# Patient Record
Sex: FEMALE | Race: OTHER | Employment: FULL TIME | URBAN - METROPOLITAN AREA
[De-identification: names, ages, dates, MRNs, and addresses within clinical notes are randomized per-mention and may not be internally consistent; named-entity substitution may affect disease eponyms.]

---

## 2017-08-25 ENCOUNTER — HOSPITAL ENCOUNTER (OUTPATIENT)
Age: 27
Setting detail: SPECIMEN
Discharge: HOME | End: 2017-08-25
Payer: COMMERCIAL

## 2017-08-25 ENCOUNTER — OFFICE VISIT (OUTPATIENT)
Dept: OBGYN CLINIC | Age: 27
End: 2017-08-25
Payer: COMMERCIAL

## 2017-08-25 VITALS
WEIGHT: 135 LBS | SYSTOLIC BLOOD PRESSURE: 92 MMHG | HEIGHT: 60 IN | BODY MASS INDEX: 26.5 KG/M2 | DIASTOLIC BLOOD PRESSURE: 60 MMHG | HEART RATE: 64 BPM

## 2017-08-25 DIAGNOSIS — Z01.419 ENCOUNTER FOR GYNECOLOGICAL EXAMINATION WITHOUT ABNORMAL FINDING: Primary | ICD-10-CM

## 2017-08-25 PROCEDURE — 99395 PREV VISIT EST AGE 18-39: CPT | Performed by: ADVANCED PRACTICE MIDWIFE

## 2017-08-25 ASSESSMENT — PATIENT HEALTH QUESTIONNAIRE - PHQ9
SUM OF ALL RESPONSES TO PHQ9 QUESTIONS 1 & 2: 0
2. FEELING DOWN, DEPRESSED OR HOPELESS: 0
SUM OF ALL RESPONSES TO PHQ QUESTIONS 1-9: 0
1. LITTLE INTEREST OR PLEASURE IN DOING THINGS: 0

## 2017-08-29 LAB — CYTOLOGY REPORT: NORMAL

## 2018-02-14 ENCOUNTER — OFFICE VISIT (OUTPATIENT)
Dept: OBGYN CLINIC | Age: 28
End: 2018-02-14
Payer: COMMERCIAL

## 2018-02-14 VITALS
HEIGHT: 62 IN | SYSTOLIC BLOOD PRESSURE: 106 MMHG | WEIGHT: 136 LBS | HEART RATE: 71 BPM | DIASTOLIC BLOOD PRESSURE: 68 MMHG | BODY MASS INDEX: 25.03 KG/M2

## 2018-02-14 DIAGNOSIS — N91.2 AMENORRHEA, UNSPECIFIED: Primary | ICD-10-CM

## 2018-02-14 LAB
CONTROL: POSITIVE
PREGNANCY TEST URINE, POC: NEGATIVE

## 2018-02-14 PROCEDURE — 1036F TOBACCO NON-USER: CPT | Performed by: ADVANCED PRACTICE MIDWIFE

## 2018-02-14 PROCEDURE — G8420 CALC BMI NORM PARAMETERS: HCPCS | Performed by: ADVANCED PRACTICE MIDWIFE

## 2018-02-14 PROCEDURE — 99213 OFFICE O/P EST LOW 20 MIN: CPT | Performed by: ADVANCED PRACTICE MIDWIFE

## 2018-02-14 PROCEDURE — 81025 URINE PREGNANCY TEST: CPT | Performed by: ADVANCED PRACTICE MIDWIFE

## 2018-02-14 PROCEDURE — G8484 FLU IMMUNIZE NO ADMIN: HCPCS | Performed by: ADVANCED PRACTICE MIDWIFE

## 2018-02-14 PROCEDURE — G8427 DOCREV CUR MEDS BY ELIG CLIN: HCPCS | Performed by: ADVANCED PRACTICE MIDWIFE

## 2018-02-14 ASSESSMENT — PATIENT HEALTH QUESTIONNAIRE - PHQ9
SUM OF ALL RESPONSES TO PHQ QUESTIONS 1-9: 0
SUM OF ALL RESPONSES TO PHQ9 QUESTIONS 1 & 2: 0
2. FEELING DOWN, DEPRESSED OR HOPELESS: 0
1. LITTLE INTEREST OR PLEASURE IN DOING THINGS: 0

## 2018-02-15 ENCOUNTER — HOSPITAL ENCOUNTER (OUTPATIENT)
Age: 28
Discharge: HOME | End: 2018-02-15
Payer: COMMERCIAL

## 2018-02-15 ENCOUNTER — HOSPITAL ENCOUNTER (OUTPATIENT)
Dept: ULTRASOUND IMAGING | Age: 28
Discharge: HOME | End: 2018-02-17
Payer: COMMERCIAL

## 2018-02-15 DIAGNOSIS — N91.2 AMENORRHEA, UNSPECIFIED: ICD-10-CM

## 2018-02-15 LAB
ESTIMATED AVERAGE GLUCOSE: 97 MG/DL
FOLLICLE STIMULATING HORMONE: 4.1 U/L (ref 1.7–21.5)
HBA1C MFR BLD: 5 % (ref 4–6)
HCG QUANTITATIVE: <1 IU/L
INSULIN COMMENT: NORMAL
INSULIN REFERENCE RANGE:: NORMAL
INSULIN: 9.7 MU/L
LH: 14.9 U/L (ref 1–95.6)
PROLACTIN: 8.89 UG/L (ref 4.79–23.3)
THYROXINE, FREE: 1.37 NG/DL (ref 0.93–1.7)
TSH SERPL DL<=0.05 MIU/L-ACNC: 3.59 MIU/L (ref 0.3–5)

## 2018-02-15 PROCEDURE — 84146 ASSAY OF PROLACTIN: CPT

## 2018-02-15 PROCEDURE — 76830 TRANSVAGINAL US NON-OB: CPT

## 2018-02-15 PROCEDURE — 83002 ASSAY OF GONADOTROPIN (LH): CPT

## 2018-02-15 PROCEDURE — 36415 COLL VENOUS BLD VENIPUNCTURE: CPT

## 2018-02-15 PROCEDURE — 84443 ASSAY THYROID STIM HORMONE: CPT

## 2018-02-15 PROCEDURE — 76856 US EXAM PELVIC COMPLETE: CPT

## 2018-02-15 PROCEDURE — 84439 ASSAY OF FREE THYROXINE: CPT

## 2018-02-15 PROCEDURE — 83036 HEMOGLOBIN GLYCOSYLATED A1C: CPT

## 2018-02-15 PROCEDURE — 83525 ASSAY OF INSULIN: CPT

## 2018-02-15 PROCEDURE — 84702 CHORIONIC GONADOTROPIN TEST: CPT

## 2018-02-15 PROCEDURE — 83001 ASSAY OF GONADOTROPIN (FSH): CPT

## 2018-02-21 ENCOUNTER — PATIENT MESSAGE (OUTPATIENT)
Dept: OBGYN CLINIC | Age: 28
End: 2018-02-21

## 2018-03-01 ENCOUNTER — OFFICE VISIT (OUTPATIENT)
Dept: OBGYN CLINIC | Age: 28
End: 2018-03-01
Payer: COMMERCIAL

## 2018-03-01 ENCOUNTER — PATIENT MESSAGE (OUTPATIENT)
Dept: OBGYN CLINIC | Age: 28
End: 2018-03-01

## 2018-03-01 VITALS
HEIGHT: 62 IN | HEART RATE: 68 BPM | WEIGHT: 134.5 LBS | BODY MASS INDEX: 24.75 KG/M2 | SYSTOLIC BLOOD PRESSURE: 100 MMHG | OXYGEN SATURATION: 99 % | DIASTOLIC BLOOD PRESSURE: 61 MMHG

## 2018-03-01 DIAGNOSIS — E28.2 PCOS (POLYCYSTIC OVARIAN SYNDROME): Primary | ICD-10-CM

## 2018-03-01 PROCEDURE — 99212 OFFICE O/P EST SF 10 MIN: CPT | Performed by: OBSTETRICS & GYNECOLOGY

## 2018-03-01 PROCEDURE — 1036F TOBACCO NON-USER: CPT | Performed by: OBSTETRICS & GYNECOLOGY

## 2018-03-01 PROCEDURE — G8484 FLU IMMUNIZE NO ADMIN: HCPCS | Performed by: OBSTETRICS & GYNECOLOGY

## 2018-03-01 PROCEDURE — G8427 DOCREV CUR MEDS BY ELIG CLIN: HCPCS | Performed by: OBSTETRICS & GYNECOLOGY

## 2018-03-01 PROCEDURE — G8420 CALC BMI NORM PARAMETERS: HCPCS | Performed by: OBSTETRICS & GYNECOLOGY

## 2018-03-05 RX ORDER — LEVOTHYROXINE SODIUM 0.05 MG/1
50 TABLET ORAL DAILY
Qty: 30 TABLET | Refills: 3 | Status: SHIPPED | OUTPATIENT
Start: 2018-03-05 | End: 2018-07-19 | Stop reason: CLARIF

## 2018-07-18 ENCOUNTER — OFFICE VISIT (OUTPATIENT)
Dept: OBGYN CLINIC | Age: 28
End: 2018-07-18
Payer: COMMERCIAL

## 2018-07-18 ENCOUNTER — HOSPITAL ENCOUNTER (OUTPATIENT)
Age: 28
Setting detail: SPECIMEN
Discharge: HOME | End: 2018-07-18
Payer: COMMERCIAL

## 2018-07-18 VITALS
WEIGHT: 132 LBS | SYSTOLIC BLOOD PRESSURE: 105 MMHG | DIASTOLIC BLOOD PRESSURE: 71 MMHG | BODY MASS INDEX: 24.29 KG/M2 | HEIGHT: 62 IN | HEART RATE: 66 BPM

## 2018-07-18 DIAGNOSIS — E28.2 PCOS (POLYCYSTIC OVARIAN SYNDROME): Primary | Chronic | ICD-10-CM

## 2018-07-18 LAB
INSULIN COMMENT: NORMAL
INSULIN REFERENCE RANGE:: NORMAL
INSULIN: 6.9 MU/L
THYROXINE, FREE: 1.36 NG/DL (ref 0.93–1.7)
TSH SERPL DL<=0.05 MIU/L-ACNC: 4.49 MIU/L (ref 0.3–5)

## 2018-07-18 PROCEDURE — G8420 CALC BMI NORM PARAMETERS: HCPCS | Performed by: OBSTETRICS & GYNECOLOGY

## 2018-07-18 PROCEDURE — 99213 OFFICE O/P EST LOW 20 MIN: CPT | Performed by: OBSTETRICS & GYNECOLOGY

## 2018-07-18 PROCEDURE — 1036F TOBACCO NON-USER: CPT | Performed by: OBSTETRICS & GYNECOLOGY

## 2018-07-18 PROCEDURE — G8427 DOCREV CUR MEDS BY ELIG CLIN: HCPCS | Performed by: OBSTETRICS & GYNECOLOGY

## 2018-07-18 NOTE — PROGRESS NOTES
The patient was seen today. She is here regarding ovarian dysfunction . Her bowels are regular and she is voiding without difficulty. HPI: 31yo  who has not been having regular menses, desires pregnancy. Thyroids have not been checked since early 2018. Taking replacement and taking daily Metformin. Past Medical History:   Diagnosis Date    History of PCOS     No pertinent family history      Past Surgical History:   Procedure Laterality Date     SECTION  2016     REVIEW OF SYSTEMS:        A minimum of an eleven point review of systems was completed and found to be negative except for the pertinent positives found below. Constitutional: No fever, chills or malaise; No weight change or fatigue  Head and Eyes: No vision, Headache, Dizziness or trauma in last 12 months  ENT ROS: No hearing, Tinnitis, sinus or taste problems  Hematological and Lymphatic ROS:No Lymphoma, Von Willebrand's, Hemophillia or Bleeding History  Psych ROS: No Depression, Homicidal thoughts,suicidal thoughts, or anxiety  Breast ROS: No prior breast abnormalities or lumps  Respiratory ROS: No SOB, Pneumoniae,Cough, or Pulmonary Embolism History  Cardiovascular ROS: No Chest Pain with Exertion, Palpitations, Syncope, Edema, Arrhythmia  Gastrointestinal ROS: No Indigestion, Heartburn, Nausea, vomiting, Diahrea, Constipation,or Bowel Changes; No Bloody Stools or melena  Genito-Urinary ROS: No Dysuria, Hematuria or Nocturia. No Urinary Incontinence or Vaginal Discharge  Musculoskeletal ROS: No Arthralgia, Arthritis,Gout,Osteoporosis or Rheumatism  Neurological ROS: No CVA, Migraines, Epilepsy, Seizure Hx, or Limb Weakness  Dermatological ROS: No Rash, Itching, Hives, Mole Changes or Cancer                                            Blood pressure 105/71, pulse 66, height 5' 2\" (1.575 m), weight 132 lb (59.9 kg), last menstrual period 2018, not currently breastfeeding. Abdomen: Soft non-tender; good bowel sounds.  No guarding, rebound or rigidity. No CVA tenderness bilaterally. Extremities: No calf tenderness, DTR 2/4, and No edema bilaterally    Pelvic: Exam deferred. Assessment:   Diagnosis Orders   1. PCOS (polycystic ovarian syndrome)  TSH    T4, Free    Insulin, Fasting       PLAN:  Follow up labs, tailor therapy, if need to adjust thyroid will wait for Clomid, if not will start 100mg after Provera withdraw. Return to the office in 12 weeks. Counseled on preventative health maintenance follow-up. Patient was seen with total face to face time of 15 minutes. More than 50% of this visit was counseling and education regarding The encounter diagnosis was PCOS (polycystic ovarian syndrome). and Follow-up (med check)   as well as  counseling on preventative health maintenance follow-up.

## 2018-07-19 ENCOUNTER — TELEPHONE (OUTPATIENT)
Dept: OBGYN CLINIC | Age: 28
End: 2018-07-19

## 2018-07-19 DIAGNOSIS — E07.9 THYROID DISEASE: Primary | ICD-10-CM

## 2018-07-19 RX ORDER — LEVOTHYROXINE SODIUM 0.1 MG/1
100 TABLET ORAL DAILY
Qty: 30 TABLET | Refills: 1 | Status: SHIPPED | OUTPATIENT
Start: 2018-07-19 | End: 2019-06-28 | Stop reason: SDUPTHER

## 2018-07-19 NOTE — TELEPHONE ENCOUNTER
Pt given results and recommendations. Pts synthroid increased to 100 mcg. Advised to repeat tsh in 6 weeks.  Order in epic

## 2018-10-04 ENCOUNTER — OFFICE VISIT (OUTPATIENT)
Dept: OBGYN CLINIC | Age: 28
End: 2018-10-04
Payer: COMMERCIAL

## 2018-10-04 ENCOUNTER — HOSPITAL ENCOUNTER (OUTPATIENT)
Age: 28
Setting detail: SPECIMEN
Discharge: HOME | End: 2018-10-04
Payer: COMMERCIAL

## 2018-10-04 VITALS
WEIGHT: 127.8 LBS | BODY MASS INDEX: 23.52 KG/M2 | SYSTOLIC BLOOD PRESSURE: 107 MMHG | HEART RATE: 71 BPM | HEIGHT: 62 IN | DIASTOLIC BLOOD PRESSURE: 64 MMHG

## 2018-10-04 DIAGNOSIS — N76.0 VAGINAL INFECTION: ICD-10-CM

## 2018-10-04 DIAGNOSIS — N89.8 VAGINAL DISCHARGE: ICD-10-CM

## 2018-10-04 DIAGNOSIS — N89.8 VAGINAL DISCHARGE: Primary | ICD-10-CM

## 2018-10-04 PROCEDURE — 99213 OFFICE O/P EST LOW 20 MIN: CPT | Performed by: ADVANCED PRACTICE MIDWIFE

## 2018-10-04 RX ORDER — FLUCONAZOLE 100 MG/1
100 TABLET ORAL DAILY
Qty: 7 TABLET | Refills: 0 | Status: SHIPPED | OUTPATIENT
Start: 2018-10-04 | End: 2018-10-11

## 2018-10-04 NOTE — PROGRESS NOTES
Nick Omer  1990  10/4/18    SUBJECTIVE    Jojo Larsen presents today with a c/o occasional vaginal irritation. She states her  is seeing a urologist x 1 year for irritation, small cuts, and swelling that happens after intercourse. He is being treated with an anti-fungal. She is wondering if maybe they are passing an infection back and forth. OBJECTIVE    No LMP recorded (lmp unknown). Vitals:    10/04/18 1056   BP: 107/64   Pulse: 71     Physical Examination: General appearance - alert, well appearing, and in no distress, oriented to person, place, and time and normal appearing weight  Mental status - alert, oriented to person, place, and time, normal mood, behavior, speech, dress, motor activity, and thought processes  Pelvic - normal external genitalia, vulva, vagina, cervix, uterus and adnexa    ASSESSMENT & PLAN    Patient Active Problem List   Diagnosis    PCOS (polycystic ovarian syndrome)    History of  section    Amenorrhea, unspecified    Vaginal infection     1. Possible vaginal infection being passed between pt and ,  being treated for candida   -- Vaginitis probe ordered   -- Diflucan ordered, patient is uncomfortable with vaginal cream   -- Abstinence recommended    Over 50% of this 15 minute visit was spent on counseling and education.

## 2018-10-05 LAB
C. TRACHOMATIS DNA ,URINE: NEGATIVE
DIRECT EXAM: NORMAL
Lab: NORMAL
N. GONORRHOEAE DNA, URINE: NEGATIVE
SPECIMEN DESCRIPTION: NORMAL
STATUS: NORMAL

## 2018-10-07 LAB
CULTURE: NORMAL
Lab: NORMAL
SPECIMEN DESCRIPTION: NORMAL
STATUS: NORMAL

## 2019-06-14 ENCOUNTER — OFFICE VISIT (OUTPATIENT)
Dept: OBGYN CLINIC | Age: 29
End: 2019-06-14
Payer: COMMERCIAL

## 2019-06-14 VITALS
SYSTOLIC BLOOD PRESSURE: 101 MMHG | HEIGHT: 62 IN | DIASTOLIC BLOOD PRESSURE: 64 MMHG | WEIGHT: 119.7 LBS | BODY MASS INDEX: 22.03 KG/M2 | HEART RATE: 61 BPM

## 2019-06-14 DIAGNOSIS — E28.2 PCOS (POLYCYSTIC OVARIAN SYNDROME): Chronic | ICD-10-CM

## 2019-06-14 DIAGNOSIS — N93.9 ABNORMAL UTERINE BLEEDING (AUB): Primary | ICD-10-CM

## 2019-06-14 PROCEDURE — 99213 OFFICE O/P EST LOW 20 MIN: CPT | Performed by: ADVANCED PRACTICE MIDWIFE

## 2019-06-14 NOTE — PROGRESS NOTES
CHIEF COMPLAINT:     Chief Complaint   Patient presents with    Menstrual Problem       HPI:   [facility] presents today to discuss menses and desire for pregnancy. Had previously needing Clomid, etc to achieve pregnancy, given PCOS history. She has lost a significant amount of weight in the past few months and so had first menses in 3 months. She did breastfeed and after weaning, had a period about 6 months later. She did see Dr Kayla Swanson for lab work but did not get done. Since then, she started working but now feels she needs to be more proactive about achieving pregnancy. She is taking a PVN. [facility] she denies any changes to her health history. GYNECOLOGIC HISTORY:     Patient's last menstrual period was 05/19/2019 (exact date). Sexually active: Yes,     STD history: No    Birth control method :No    REVIEW OF SYSTEMS:  Review of Systems   Constitutional: Negative. HENT: Negative. Respiratory: Negative. Cardiovascular: Negative. Gastrointestinal: Negative. Genitourinary: Positive for menstrual problem. Irregular menses   Musculoskeletal: Negative. Skin: Negative. Neurological: Negative. PHYSICAL EXAM:  Constitutional:   Blood pressure 101/64, pulse 61, height 5' 2\" (1.575 m), weight 119 lb 11.2 oz (54.3 kg), last menstrual period 05/19/2019, not currently breastfeeding. Wt Readings from Last 3 Encounters:   06/14/19 119 lb 11.2 oz (54.3 kg)   01/29/19 127 lb 12.8 oz (58 kg)   10/04/18 127 lb 12.8 oz (58 kg)       General Appearance: This is a well-developed, well-nourishedand well-groomed female    Skin:  Inspection of the skin revealed no rashes or lesions     Pelvic:  Deferred    Psychiatric:  Alert, oriented to time, place, person and situation. There are no mood or affect changes. ASSESSMENT/PLAN:  1. Abnormal uterine bleeding (AUB)  - HCG, Quantitative, Pregnancy; Future  - Prolactin; Future  - T4, Free; Future  - TSH without Reflex;  Future  - Hemoglobin A1C; Future  - Insulin, Fasting; Future  - Follicle Stimulating Hormone; Future  - US Non OB Transvaginal; Future  - US Pelvis Complete; Future  - Lipid Panel; Future  - Discussed updated blood work needed, as working with new Collaborators and desires. She will schedule follow-up with physician to establish plan of care, if Clomid, etc needed again    2. PCOS (polycystic ovarian syndrome)  - Discussed onset of menses with weight loss, may be triggering ovulation  - Reinforced weight loss with low CHO diet    Patient was seen with totalface to face time of 15 minutes. More than 50% of this visit was on counseling and education regarding her   1. Abnormal uterine bleeding (AUB)    2. PCOS (polycystic ovarian syndrome)     and her options. She was also counseled on her preventative health maintenance recommendations andfollow-up.

## 2019-06-15 PROBLEM — N93.9 ABNORMAL UTERINE BLEEDING (AUB): Status: ACTIVE | Noted: 2019-06-15

## 2019-06-15 PROBLEM — N76.0 VAGINAL INFECTION: Status: RESOLVED | Noted: 2018-10-04 | Resolved: 2019-06-15

## 2019-06-15 ASSESSMENT — ENCOUNTER SYMPTOMS
GASTROINTESTINAL NEGATIVE: 1
RESPIRATORY NEGATIVE: 1

## 2019-06-24 ENCOUNTER — HOSPITAL ENCOUNTER (OUTPATIENT)
Age: 29
Discharge: HOME | End: 2019-06-24
Payer: COMMERCIAL

## 2019-06-24 DIAGNOSIS — N93.9 ABNORMAL UTERINE BLEEDING (AUB): ICD-10-CM

## 2019-06-24 LAB
CHOLESTEROL/HDL RATIO: 2.5
CHOLESTEROL: 165 MG/DL
ESTIMATED AVERAGE GLUCOSE: 91 MG/DL
FOLLICLE STIMULATING HORMONE: 5.6 U/L (ref 1.7–21.5)
HBA1C MFR BLD: 4.8 % (ref 4–6)
HCG QUANTITATIVE: <1 IU/L
HDLC SERPL-MCNC: 67 MG/DL
INSULIN COMMENT: NORMAL
INSULIN REFERENCE RANGE:: NORMAL
INSULIN: 5.2 MU/L
LDL CHOLESTEROL: 89 MG/DL (ref 0–130)
PROLACTIN: 7.89 UG/L (ref 4.79–23.3)
THYROXINE, FREE: 1.1 NG/DL (ref 0.93–1.7)
TRIGL SERPL-MCNC: 43 MG/DL
TSH SERPL DL<=0.05 MIU/L-ACNC: 2.62 MIU/L (ref 0.3–5)
VLDLC SERPL CALC-MCNC: NORMAL MG/DL (ref 1–30)

## 2019-06-24 PROCEDURE — 84702 CHORIONIC GONADOTROPIN TEST: CPT

## 2019-06-24 PROCEDURE — 80061 LIPID PANEL: CPT

## 2019-06-24 PROCEDURE — 83001 ASSAY OF GONADOTROPIN (FSH): CPT

## 2019-06-24 PROCEDURE — 36415 COLL VENOUS BLD VENIPUNCTURE: CPT

## 2019-06-24 PROCEDURE — 84443 ASSAY THYROID STIM HORMONE: CPT

## 2019-06-24 PROCEDURE — 83036 HEMOGLOBIN GLYCOSYLATED A1C: CPT

## 2019-06-24 PROCEDURE — 83525 ASSAY OF INSULIN: CPT

## 2019-06-24 PROCEDURE — 84439 ASSAY OF FREE THYROXINE: CPT

## 2019-06-24 PROCEDURE — 84146 ASSAY OF PROLACTIN: CPT

## 2019-06-25 ENCOUNTER — HOSPITAL ENCOUNTER (OUTPATIENT)
Dept: ULTRASOUND IMAGING | Age: 29
Discharge: HOME | End: 2019-06-27
Payer: COMMERCIAL

## 2019-06-25 DIAGNOSIS — N93.9 ABNORMAL UTERINE BLEEDING (AUB): ICD-10-CM

## 2019-06-25 PROCEDURE — 76856 US EXAM PELVIC COMPLETE: CPT

## 2019-06-25 PROCEDURE — 76830 TRANSVAGINAL US NON-OB: CPT

## 2019-06-28 ENCOUNTER — OFFICE VISIT (OUTPATIENT)
Dept: OBGYN CLINIC | Age: 29
End: 2019-06-28
Payer: COMMERCIAL

## 2019-06-28 VITALS
HEART RATE: 72 BPM | DIASTOLIC BLOOD PRESSURE: 66 MMHG | HEIGHT: 62 IN | BODY MASS INDEX: 21.71 KG/M2 | WEIGHT: 118 LBS | SYSTOLIC BLOOD PRESSURE: 95 MMHG

## 2019-06-28 DIAGNOSIS — E28.2 PCOS (POLYCYSTIC OVARIAN SYNDROME): Primary | ICD-10-CM

## 2019-06-28 DIAGNOSIS — E03.9 HYPOTHYROIDISM, UNSPECIFIED TYPE: ICD-10-CM

## 2019-06-28 PROCEDURE — 99213 OFFICE O/P EST LOW 20 MIN: CPT | Performed by: OBSTETRICS & GYNECOLOGY

## 2019-06-28 RX ORDER — LETROZOLE 2.5 MG/1
2.5 TABLET, FILM COATED ORAL DAILY
Qty: 5 TABLET | Refills: 2 | Status: SHIPPED
Start: 2019-06-28 | End: 2020-09-04 | Stop reason: DRUGHIGH

## 2019-06-28 RX ORDER — LEVOTHYROXINE SODIUM 0.1 MG/1
100 TABLET ORAL DAILY
Qty: 30 TABLET | Refills: 3 | Status: SHIPPED | OUTPATIENT
Start: 2019-06-28 | End: 2020-05-31

## 2019-06-28 RX ORDER — MEDROXYPROGESTERONE ACETATE 10 MG/1
10 TABLET ORAL DAILY
Qty: 30 TABLET | Refills: 0 | Status: SHIPPED | OUTPATIENT
Start: 2019-06-28 | End: 2020-05-19 | Stop reason: SDUPTHER

## 2019-06-28 NOTE — PROGRESS NOTES
[facility]  [facility] - [address] [address]  Dept: [phone]  Dept Fax: [phone]    06/28/19    Chief Complaint   Patient presents with    Results       Forest Shell 26 y.o. Alexys Patrick presents for follow up of results. She has been seeing the midwives but has h/o PCOS (irregular menses and polycystic ovaries with volume >10 cubic cm on ultrasound) and currently desires pregnancy. Her last pregnancy was conceived on the 6th round of clomid. History also pertinent for hypothyroidism. States she was taking synthroid 100mcg \"but ran out of the pills some time ago. \"  She has been improving her diet and exercise regimen over the past few years and has lost some weight. She usually requires progesterone for a withdrawal bleed but did have a normal period not triggered by progesterone last month. Patient's last menstrual period was 05/19/2019 (exact date). She and her  have been trying for the past two years to conceive and she was hoping she could proceed with ovulation induction again since they have not had any success on their own. Reports the only medication she is taking currently is a prenatal vitamin.       REVIEW OF SYSTEMS:    Constitutional: negative fever, negative chills  HEENT: negative visual disturbances, negative headaches  Respiratory: negative dyspnea, negative cough  Cardiovascular: negative chest pain,  negative palpitations  Gastrointestinal: negative Abdominal pain, negative RUQ pain, negative N/V/D, negative constipation  Genitourinary: negative dysuria, negative vaginal discharge  Dermatological: negative rash, negative wounds  Hematologic: negative bleeding/clotting disorder  Immunologic: negative recent illness, negative recent sick contact, negative allergic reactions  Lymphatic: negative lymph nodes  Musculoskeletal: negative back pain, negative myalgias, negative arthralgias  Neurological:  negative dizziness, negative weakness  Behavior/Psych: negative depression, negative anxiety    Past Medical History:   Diagnosis Date    History of PCOS     No pertinent family history      Past Surgical History:   Procedure Laterality Date     SECTION  2016     No Known Allergies  Current Outpatient Medications   Medication Sig Dispense Refill    Prenatal Multivit-Min-Fe-FA (PRENATAL VITAMINS PO) Take 1 tablet by mouth daily      metFORMIN (GLUCOPHAGE) 500 MG tablet Take 1 tablet by mouth daily (with breakfast) 60 tablet 3    levothyroxine (SYNTHROID) 100 MCG tablet Take 1 tablet by mouth daily 30 tablet 1     No current facility-administered medications for this visit.       Social History     Socioeconomic History    Marital status:      Spouse name: Not on file    Number of children: Not on file    Years of education: Not on file    Highest education level: Not on file   Occupational History    Not on file   Social Needs    Financial resource strain: Not on file    Food insecurity:     Worry: Not on file     Inability: Not on file    Transportation needs:     Medical: Not on file     Non-medical: Not on file   Tobacco Use    Smoking status: Never Smoker    Smokeless tobacco: Never Used   Substance and Sexual Activity    Alcohol use: No     Alcohol/week: 0.0 oz    Drug use: No    Sexual activity: Yes     Partners: Male   Lifestyle    Physical activity:     Days per week: Not on file     Minutes per session: Not on file    Stress: Not on file   Relationships    Social connections:     Talks on phone: Not on file     Gets together: Not on file     Attends [employer] service: Not on file     Active member of club or organization: Not on file     Attends meetings of clubs or organizations: Not on file     Relationship status: Not on file    Intimate partner violence:     Fear of current or ex partner: Not on file     Emotionally abused: Not on file     Physically abused: Not on file     Forced sexual activity: Not on file   Other Topics Concern    Not on file   Social History Narrative    Not on file     No family history on file.     Vitals:    06/28/19 1140   BP: 95/66   Site: Right Upper Arm   Position: Sitting   Cuff Size: Medium Adult   Pulse: 72   Weight: 118 lb (53.5 kg)   Height: 5' 2\" (1.575 m)       Physical exam  Gen: no acute distress, appears comfortable  Heart: regular rate and rhythm, no murmurs appreciated  Lungs: clear to auscultation bilaterally  Abd: soft, nontender, nondistended, no rebound/guarding/rigidity  Ext: no edema or calf tenderness    Recent Results (from the past 120 hour(s))   HCG, Quantitative, Pregnancy    Collection Time: 06/24/19 10:18 AM   Result Value Ref Range    hCG Quant <1 <5 IU/L   Prolactin    Collection Time: 06/24/19 10:18 AM   Result Value Ref Range    Prolactin 7.89 4.79 - 23.30 ug/L   T4, Free    Collection Time: 06/24/19 10:18 AM   Result Value Ref Range    Thyroxine, Free 1.10 0.93 - 1.70 ng/dL   TSH without Reflex    Collection Time: 06/24/19 10:18 AM   Result Value Ref Range    TSH 2.62 0.30 - 5.00 mIU/L   Hemoglobin A1C    Collection Time: 06/24/19 10:18 AM   Result Value Ref Range    Hemoglobin A1C 4.8 4.0 - 6.0 %    Estimated Avg Glucose 91 mg/dL   Follicle Stimulating Hormone    Collection Time: 06/24/19 10:18 AM   Result Value Ref Range    FSH 5.6 1.7 - 21.5 U/L   Lipid Panel    Collection Time: 06/24/19 10:18 AM   Result Value Ref Range    Cholesterol 165 <200 mg/dL    HDL 67 >40 mg/dL    LDL Cholesterol 89 0 - 130 mg/dL    Chol/HDL Ratio 2.5 <5    Triglycerides 43 <150 mg/dL    VLDL NOT REPORTED 1 - 30 mg/dL   Insulin, total    Collection Time: 06/24/19 10:18 AM   Result Value Ref Range    Insulin Comment Patient fasting     Insulin 5.2 mU/L    Insulin Reference Range:           Narrative   EXAMINATION:   PELVIC ULTRASOUND       6/25/2019       TECHNIQUE:   Transabdominal and transvaginal pelvic ultrasound was performed.       COMPARISON: 02/15/2018 ultrasound       HISTORY:   ORDERING SYSTEM PROVIDED HISTORY: Abnormal uterine bleeding (AUB)   TECHNOLOGIST PROVIDED HISTORY:   AUB       FINDINGS:       Measurements:       Uterus:  10.2 x 2.8 x 3.9 cm       Endometrial stripe:  11 mm       Right Ovary:  3.7 x 2.2 x 3.4 cm       Left Ovary:  3.6 x 2.6 x 2.8 cm           Ultrasound Findings:       Uterus: The uterus is anteverted.  No myometrial abnormalities are detected.       Endometrial stripe: Endometrial stripe is within normal limits.       Right Ovary: Multiple tiny anechoic follicles are present in the right ovary.       Left Ovary:  Multiple tiny anechoic follicles are present in the left ovary.       Free Fluid: No evidence of free fluid.           Impression   Physiologic follicles in the bilateral ovaries.  No other significant finding.               Assessment/Plan    May Bonds I Michael Garcia is a 26 y.o. Edmund Morning with PCOS and hypothyroidism who desires pregnancy   - reviewed lab results and reassurance given. Encouraged compliance with synthroid and Rx sent to pharmacy. Repeat TSH and T4 in 4 weeks. - discussed ovulation induction options of clomid or letrozole. Reviewed that even though letrozole is not FDA approved for OI it is recommended first line by ACOG and is superior to clomid in relation of outcome of live births for women with PCOS undergoing OI. Patient vocalized understanding and would like to proceed with letrozole 2.5mg daily on days 3-7. Will utilize provera 10g po daily x10 days to initiate menses.    - RTO in 2 months for follow up or earlier prn.     See-Miryam Hester, DO  [address]

## 2020-05-15 ENCOUNTER — TELEPHONE (OUTPATIENT)
Dept: OBGYN CLINIC | Age: 30
End: 2020-05-15

## 2020-05-19 ENCOUNTER — TELEPHONE (OUTPATIENT)
Dept: OBGYN CLINIC | Age: 30
End: 2020-05-19

## 2020-05-19 ENCOUNTER — TELEMEDICINE (OUTPATIENT)
Dept: OBGYN CLINIC | Age: 30
End: 2020-05-19
Payer: COMMERCIAL

## 2020-05-19 VITALS — BODY MASS INDEX: 24.48 KG/M2 | HEIGHT: 62 IN | WEIGHT: 133 LBS

## 2020-05-19 PROCEDURE — 99213 OFFICE O/P EST LOW 20 MIN: CPT | Performed by: OBSTETRICS & GYNECOLOGY

## 2020-05-19 RX ORDER — MEDROXYPROGESTERONE ACETATE 10 MG/1
10 TABLET ORAL DAILY
Qty: 30 TABLET | Refills: 1 | Status: SHIPPED | OUTPATIENT
Start: 2020-05-19 | End: 2021-01-29

## 2020-05-19 RX ORDER — MEDROXYPROGESTERONE ACETATE 10 MG/1
10 TABLET ORAL DAILY
Qty: 30 TABLET | Refills: 0 | Status: SHIPPED | OUTPATIENT
Start: 2020-05-19 | End: 2020-05-19

## 2020-05-19 NOTE — PROGRESS NOTES
[facility]ANS  [facility] - [address] [id number] [facility]  Dept: [phone]  Dept Fax: [phone]  05/19/20      TELEHEALTH VIDEO VISIT    This visit was completed via MyChart video due to the restrictions of the COVID-19 pandemic. All issues as below were discussed and addressed but no physical exam was performed unless allowed by visual confirmation on MyChart video. Reviewed that if it was felt that the patient should be evaluated in clinic then she would be directed there. The patient verbally consented to visit. Yuliya Marcus is a 26 y.o. Art Camacho. She is the only one present and is currently at home. Reports that she wanted to follow up on her periods and medications that were previously prescribed. She admits that she only completed 2 months of letrozole and then didn't follow up because she was feeling defeated. She knows that she and her  had to undergo six rounds of clomid last time before pregnancy but decided to just see how her body did \"off of the medications. \" She also stopped her synthroid. She states she continues to work out and watch what she eats but reports that her periods continued to be irregular, occurring maybe every 2-3 months. Patient's last menstrual period was 02/22/2020 (within days). She would like to discuss restarting letrozole.     REVIEW OF SYSTEMS:    Constitutional: negative fever, negative chills  HEENT: negative visual disturbances, negative headaches  Respiratory: negative dyspnea, negative cough  Cardiovascular: negative chest pain,  negative palpitations  Gastrointestinal: negative Abdominal pain, negative RUQ pain, negative N/V/D, negative constipation  Genitourinary: negative dysuria, negative vaginal discharge  Dermatological: negative rash, negative wounds  Hematologic: negative bleeding/clotting disorder  Immunologic: negative recent illness, negative recent sick contact, negative allergic reactions  Lymphatic: negative lymph nodes  Musculoskeletal: negative back pain, negative myalgias, negative arthralgias  Neurological:  negative dizziness, negative weakness  Behavior/Psych: negative depression, negative anxiety      Physical Exam: (performed to the best of my ability via webcam)  General Appearance: Appears healthy. Alert; in no acute distress. Pleasant. HEENT: normocephalic and atraumatic  Respiratory: Normal respiratory rate without signs of respiratory distress    Musculoskeletal: no gross abnormalities  Psych:  oriented to time, place and person, mood and affect are within normal limits       Assessment/Plan  Sundeep Guillermoh I Stanley De La Paz is a 26 y.o. Calvin Cuban with PCOS and desire for pregnancy   - Reviewed diagnosis of PCOS and implications of the disease including increased risk for insulin resistance/DM, lipid abnormalities and cardiovascular disease, as well as GYN specific effects (possible infertility, oligomenorrhea, increased risk of endometrial cancer). Encouraged weight loss with diet and exercise. - Reviewed medical management options for endometrial protection, including side effect profiles and dosing regimens. All questions answered. Patient desires to proceed with provera at this time. She is hopeful that once she has regular cycles, she and her  can try Cass Angela again.    - Discussed need for hypothyroidism to be treated with synthroid, especially as thyroid disease can affect fertility/pregnancy. Reviewed importance of trending TSH/T4 to assess for approppriate dosage of medication. She vocalized understanding and reports she will complete the labs asap   - Continue prenatal vitamin.   - RTO in 2-3 months to follow up labs and provera. Will assess plan to restart letrozole at that time. All questions answered and patient vocalized understanding. She is grateful that she did not have to leave her home quarantine for this visit.     Due to this being a TeleHealth encounter

## 2020-05-30 ENCOUNTER — HOSPITAL ENCOUNTER (OUTPATIENT)
Age: 30
Discharge: HOME | End: 2020-05-30
Payer: COMMERCIAL

## 2020-05-30 LAB
THYROXINE, FREE: 1.14 NG/DL (ref 0.93–1.7)
TSH SERPL DL<=0.05 MIU/L-ACNC: 3.08 MIU/L (ref 0.3–5)

## 2020-05-30 PROCEDURE — 84443 ASSAY THYROID STIM HORMONE: CPT

## 2020-05-30 PROCEDURE — 36415 COLL VENOUS BLD VENIPUNCTURE: CPT

## 2020-05-30 PROCEDURE — 84439 ASSAY OF FREE THYROXINE: CPT

## 2020-05-31 RX ORDER — LEVOTHYROXINE SODIUM 0.1 MG/1
100 TABLET ORAL DAILY
Qty: 30 TABLET | Refills: 1 | Status: SHIPPED
Start: 2020-05-31 | End: 2020-07-17 | Stop reason: DRUGHIGH

## 2020-06-25 ENCOUNTER — TELEPHONE (OUTPATIENT)
Dept: OBGYN CLINIC | Age: 30
End: 2020-06-25

## 2020-06-25 NOTE — TELEPHONE ENCOUNTER
ramseym to see if pt would be ok with switching her visit on 7/17 from an in office visit to a virtual video visit

## 2020-07-15 ENCOUNTER — HOSPITAL ENCOUNTER (OUTPATIENT)
Age: 30
Discharge: HOME | End: 2020-07-15
Payer: COMMERCIAL

## 2020-07-15 LAB
THYROXINE, FREE: 2.1 NG/DL (ref 0.93–1.7)
TSH SERPL DL<=0.05 MIU/L-ACNC: 0.1 MIU/L (ref 0.3–5)

## 2020-07-15 PROCEDURE — 36415 COLL VENOUS BLD VENIPUNCTURE: CPT

## 2020-07-15 PROCEDURE — 84439 ASSAY OF FREE THYROXINE: CPT

## 2020-07-15 PROCEDURE — 84443 ASSAY THYROID STIM HORMONE: CPT

## 2020-07-17 ENCOUNTER — TELEMEDICINE (OUTPATIENT)
Dept: OBGYN CLINIC | Age: 30
End: 2020-07-17
Payer: COMMERCIAL

## 2020-07-17 PROCEDURE — 99213 OFFICE O/P EST LOW 20 MIN: CPT | Performed by: OBSTETRICS & GYNECOLOGY

## 2020-07-17 RX ORDER — LEVOTHYROXINE SODIUM 0.05 MG/1
50 TABLET ORAL DAILY
Qty: 30 TABLET | Refills: 1 | Status: SHIPPED | OUTPATIENT
Start: 2020-07-17 | End: 2020-08-25 | Stop reason: SDUPTHER

## 2020-07-17 NOTE — PROGRESS NOTES
[facility]ANS  [facility] - [address] [facility] [address] [place] [address]  Dept: [phone]  Dept Fax: [phone]  07/17/20      TELEHEALTH VIDEO VISIT    This visit was completed via [address] video due to the restrictions of the COVID-19 pandemic. All issues as below were discussed and addressed but no physical exam was performed unless allowed by visual confirmation on MyChart video. Reviewed that if it was felt that the patient should be evaluated in clinic then she would be directed there. The patient verbally consented to visit. Curt Yuen is a 31 y.o. Yahaira Sa. She is the only one present and is currently at home. Reports that she thinks she maybe started to have her own period from 7/1 to 7/7. She described having a \"discharge. \" On July 5th, she started her provera as scheduled and then had a definite period from July 13-17. She reports compliance with her thyroid medications and states she has been working on her diet and exercise. She and her partner are okay to wait a little longer before starting the femara.     REVIEW OF SYSTEMS:    Constitutional: negative fever, negative chills  HEENT: negative visual disturbances, negative headaches  Respiratory: negative dyspnea, negative cough  Cardiovascular: negative chest pain,  negative palpitations  Gastrointestinal: negative Abdominal pain, negative RUQ pain, negative N/V/D, negative constipation  Genitourinary: negative dysuria, negative vaginal discharge  Dermatological: negative rash, negative wounds  Hematologic: negative bleeding/clotting disorder  Immunologic: negative recent illness, negative recent sick contact, negative allergic reactions  Lymphatic: negative lymph nodes  Musculoskeletal: negative back pain, negative myalgias, negative arthralgias  Neurological:  negative dizziness, negative weakness  Behavior/Psych: negative depression, negative anxiety      Physical Exam: (performed to the best of my ability via webcam)  General Appearance: Appears healthy. Alert; in no acute distress. Pleasant. HEENT: normocephalic and atraumatic  Respiratory: Normal respiratory rate without signs of respiratory distress    Musculoskeletal: no gross abnormalities  Psych:  oriented to time, place and person, mood and affect are within normal limits     Recent Results (from the past 72 hour(s))   T4, Free    Collection Time: 07/15/20  6:40 PM   Result Value Ref Range    Thyroxine, Free 2.10 (H) 0.93 - 1.70 ng/dL   TSH    Collection Time: 07/15/20  6:40 PM   Result Value Ref Range    TSH 0.10 (L) 0.30 - 5.00 mIU/L       Assessment/Plan  Kimberly Cabrera is a 31 y.o. Cedar Binning with PCOS and hypothyroidism   - reviewed thyroid study results. Will decrease synthroid dose and repeat TSH/T4 in four weeks. Rx sent and lab orders placed. - encouraged compliance with provera therapy for regulation of menses   - will call with thyroid study results in 4 weeks and assess if ready for letrozole 5mg   - RTO for annual or earlier prn    All questions answered and patient vocalized understanding. She is grateful that she did not have to leave her home quarantine for this visit. Due to this being a TeleHealth encounter (During [facility] public health emergency), evaluation of the following organ systems was limited: Vitals/Constitutional/EENT/Resp/CV/GI//MS/Neuro/Skin/Heme-Lymph-Imm. Pursuant to the emergency declaration under the [address] [place] [address], [address] [place] [address] authority and the [employer] Act, this Virtual Visit was conducted with patient's (and/or legal guardian's) consent, to reduce the patient's risk of exposure to COVID-19 and provide necessary medical care.   The patient (and/or legal guardian) has also been advised to contact this office for worsening conditions or problems, and seek emergency medical treatment and/or call 911 if deemed necessary. Services were provided through a video synchronous discussion virtually to substitute for in-person clinic visit. Patient was located at home and provider at her office in [place], [place].     DO [facility] [place]  7/17/2020 9:49 AM

## 2020-08-18 ENCOUNTER — HOSPITAL ENCOUNTER (OUTPATIENT)
Age: 30
Discharge: HOME | End: 2020-08-18
Payer: COMMERCIAL

## 2020-08-18 LAB
THYROXINE, FREE: 1.48 NG/DL (ref 0.93–1.7)
TSH SERPL DL<=0.05 MIU/L-ACNC: 1.21 MIU/L (ref 0.3–5)

## 2020-08-18 PROCEDURE — 36415 COLL VENOUS BLD VENIPUNCTURE: CPT

## 2020-08-18 PROCEDURE — 84439 ASSAY OF FREE THYROXINE: CPT

## 2020-08-18 PROCEDURE — 84443 ASSAY THYROID STIM HORMONE: CPT

## 2020-08-25 RX ORDER — LEVOTHYROXINE SODIUM 0.05 MG/1
50 TABLET ORAL DAILY
Qty: 30 TABLET | Refills: 1 | Status: SHIPPED
Start: 2020-08-25 | End: 2020-12-07 | Stop reason: DRUGHIGH

## 2020-09-04 ENCOUNTER — HOSPITAL ENCOUNTER (OUTPATIENT)
Age: 30
Setting detail: SPECIMEN
Discharge: HOME | End: 2020-09-04
Payer: COMMERCIAL

## 2020-09-04 ENCOUNTER — TELEPHONE (OUTPATIENT)
Dept: OBGYN CLINIC | Age: 30
End: 2020-09-04

## 2020-09-04 ENCOUNTER — OFFICE VISIT (OUTPATIENT)
Dept: OBGYN CLINIC | Age: 30
End: 2020-09-04
Payer: COMMERCIAL

## 2020-09-04 VITALS
BODY MASS INDEX: 24.14 KG/M2 | DIASTOLIC BLOOD PRESSURE: 62 MMHG | HEIGHT: 62 IN | WEIGHT: 131.2 LBS | SYSTOLIC BLOOD PRESSURE: 109 MMHG | HEART RATE: 61 BPM

## 2020-09-04 LAB
CONTROL: PRESENT
PREGNANCY TEST URINE, POC: NEGATIVE

## 2020-09-04 PROCEDURE — 81025 URINE PREGNANCY TEST: CPT | Performed by: OBSTETRICS & GYNECOLOGY

## 2020-09-04 PROCEDURE — 99395 PREV VISIT EST AGE 18-39: CPT | Performed by: OBSTETRICS & GYNECOLOGY

## 2020-09-04 RX ORDER — LETROZOLE 2.5 MG/1
5 TABLET, FILM COATED ORAL DAILY
Qty: 20 TABLET | Refills: 0 | Status: SHIPPED | OUTPATIENT
Start: 2020-09-04 | End: 2021-01-29

## 2020-09-04 RX ORDER — LETROZOLE 2.5 MG/1
5 TABLET, FILM COATED ORAL DAILY
Qty: 20 TABLET | Refills: 0 | Status: SHIPPED | OUTPATIENT
Start: 2020-09-04 | End: 2020-09-04 | Stop reason: SDUPTHER

## 2020-09-04 RX ORDER — LETROZOLE 2.5 MG/1
2.5 TABLET, FILM COATED ORAL DAILY
Qty: 20 TABLET | Refills: 2 | Status: SHIPPED
Start: 2020-09-04 | End: 2020-09-04 | Stop reason: DRUGHIGH

## 2020-09-04 NOTE — PROGRESS NOTES
Aung Gomez  2020                         Primary Care Physician: Remigio Moran PA-C    CC:   Chief Complaint   Patient presents with    Annual Exam     last pap 2017 N         HPI: Aung Gomez is a 31 y.o. female  Patient's last menstrual period was 2020. The patient was seen and examined. She is here for an annual visit. She reports that she has been taking the provera and synthroid without any difficulty. Her periods have been regular and lasting about 5-7 days. She admits that her partner had a semen analysis a few years ago and she thinks there was low motility. They would like to restart the femara if possible. Her bowel habits are regular. She denies any bloating. She denies dysuria. She denies urinary leaking. She denies vaginal discharge. She is sexually active with a male partner.      Depression Screen: Symptoms of decreased mood absent  Symptoms of anhedonia absent  **If either question is answered in a  positive fashion then complete the PHQ9 Scoring Evaluation and make the appropriate referral**    REVIEW OF SYSTEMS:  Constitutional: negative fever, negative chills  HEENT: negative visual disturbances, negative headaches  Respiratory: negative dyspnea, negative cough  Cardiovascular: negative chest pain,  negative palpitations  Gastrointestinal: negative abdominal pain, negative RUQ pain, negative N/V, negative diarrhea, negative constipation  Genitourinary: negative dysuria, negative vaginal discharge  Dermatological: negative rash  Hematologic: negative bruising  Immunologic/Lymphatic: negative recent illness, negative recent sick contact  Musculoskeletal: negative back pain, negative myalgias, negative arthralgias  Neurological:  negative dizziness, negative weakness  Behavior/Psych: negative depression, negative anxiety      GYNECOLOGICAL HISTORY:  Sexually Active: has sex with males  STD History: no past history    Pap History: NILM 17  Colposcopy History: denies    Permanent Sterilization: no  Reversible Birth Control: no  Hormone Replacement Exposure: no    OBSTETRICAL HISTORY:  OB History    Para Term  AB Living   1 1 1 0 0 1   SAB TAB Ectopic Molar Multiple Live Births   0 0 0 0 0 1      # Outcome Date GA Lbr Owen/2nd Weight Sex Delivery Anes PTL Lv   1 Term 16 41w1d  8 lb 10.6 oz (3.93 kg) F CS-LTranv   DEIDRE      Complications: Failure to Progress in First Stage      Apgar1: 8  Apgar5: 9       PAST MEDICAL HISTORY:   has a past medical history of No pertinent family history and PCOS (polycystic ovarian syndrome). PAST SURGICAL HISTORY:   has a past surgical history that includes  section (2016). ALLERGIES:  has No Known Allergies. MEDICATIONS:  Prior to Admission medications    Medication Sig Start Date End Date Taking? Authorizing Provider   levothyroxine (SYNTHROID) 50 MCG tablet Take 1 tablet by mouth daily 20  Yes SeeRoge So, DO   metFORMIN (GLUCOPHAGE) 500 MG tablet Take 1 tablet by mouth daily (with breakfast) 20  Yes SeeRoge So, DO   Prenatal Multivit-Min-Fe-FA (PRENATAL VITAMINS PO) Take 1 tablet by mouth daily   Yes Historical Provider, MD   medroxyPROGESTERone (PROVERA) 10 MG tablet Take 1 tablet by mouth daily For 10 days every month  Patient not taking: Reported on 2020   SeeRoge So, DO   letrozole (FEMARA) 2.5 MG tablet Take 1 tablet by mouth daily For 5 days on days 3-7 with day one being the first day of your period. Patient not taking: Reported on 2020   SeeRoge So, DO       FAMILY HISTORY:  Family History of Breast, Ovarian, Colon or Uterine Cancer: No   family history is not on file. SOCIAL HISTORY:   reports that she has never smoked. She has never used smokeless tobacco. She reports that she does not drink alcohol or use drugs.     HEALTH MAINTENANCE:  Immunization status: stated as up to date, no records available  Date of Last Mammogram: n/a  Date of Last Colonoscopy: n/a  Date of Last Bone Density: n/a    VITALS:  Vitals:    20 0957   BP: 109/62   Site: Left Upper Arm   Position: Sitting   Cuff Size: Medium Adult   Pulse: 61   Weight: 131 lb 3.2 oz (59.5 kg)   Height: 5' 2\" (1.575 m)                                                                                                                                                                         PHYSICAL EXAM:   General Appearance: Appears healthy. Alert; in no acute distress. Pleasant. Skin: Skin color, texture, turgor normal. No rashes or lesions. HEENT: normocephalic and atraumatic   Respiratory: Normal expansion. Clear to auscultation. No rales, rhonchi, or wheezing.   Cardiovascular: normal rate and normal S1 and S2  Breast:  (Chest): normal appearance, no masses or tenderness, No nipple retraction or dimpling, No nipple discharge or bleeding, No axillary or supraclavicular adenopathy, Normal to palpation without dominant masses  Abdomen: soft, non-tender, non-distended, no right upper quadrant tenderness and no CVA tenderness  Pelvic Exam:   External genitalia: normal hair distribution, no lesions or erythema  Urinary system: urethral meatus normal, no bladder tenderness  Vaginal: normal mucosa, no lesions or discharge noted  Cervix: normal appearing cervix without discharge or lesions, no CMT  Adnexa: normal adnexa in size, nontender and no masses  Uterus: about 6-8wk size, anteverted, nontender, no masses  Musculoskeletal: no gross abnormalities  Extremities: non-tender BLE and non-edematous  Psych:  oriented to time, place and person, mood and affect are within normal limits       ASSESSMENT & PLAN:    Ana Lilia Vanessa is a 31 y.o. female  Patient's last menstrual period was 2020.   - pap smear collected and sent for cotesting   - std prevention and barrier recommendations reviewed    PCOS and desired pregnancy  - UPT negative today, okay to Patient desires pregnancy            DO Vilma [facility]  - [place]  9/4/2020 10:00 AM

## 2020-09-12 LAB
HPV SOURCE: NORMAL
HPV, GENOTYPE 16: NOT DETECTED
HPV, GENOTYPE 18: NOT DETECTED
HPV, HIGH RISK OTHER: NOT DETECTED

## 2020-09-15 LAB — CYTOLOGY REPORT: NORMAL

## 2020-11-16 ENCOUNTER — TELEPHONE (OUTPATIENT)
Dept: OBGYN CLINIC | Age: 30
End: 2020-11-16

## 2020-11-16 NOTE — TELEPHONE ENCOUNTER
Please let her know it is recommended that she keep taking the prenatal vitamin, metformin and thyroid medications. Thanks!

## 2020-11-16 NOTE — TELEPHONE ENCOUNTER
Pt. Called she recently just found of she is pregnant her LMP was 10/14 she has a appt scheduled Dec 7 she was just wondering if it is still safe for her to take her metFormin and her thyroid medication while pregnant.

## 2020-12-07 ENCOUNTER — HOSPITAL ENCOUNTER (OUTPATIENT)
Age: 30
Discharge: HOME | End: 2020-12-07
Payer: COMMERCIAL

## 2020-12-07 ENCOUNTER — OFFICE VISIT (OUTPATIENT)
Dept: OBGYN CLINIC | Age: 30
End: 2020-12-07
Payer: COMMERCIAL

## 2020-12-07 ENCOUNTER — HOSPITAL ENCOUNTER (OUTPATIENT)
Age: 30
Setting detail: SPECIMEN
Discharge: HOME | End: 2020-12-07
Payer: COMMERCIAL

## 2020-12-07 VITALS
HEART RATE: 72 BPM | DIASTOLIC BLOOD PRESSURE: 62 MMHG | BODY MASS INDEX: 24.8 KG/M2 | SYSTOLIC BLOOD PRESSURE: 102 MMHG | WEIGHT: 134.8 LBS | HEIGHT: 62 IN

## 2020-12-07 PROBLEM — O09.90 HIGH RISK PREGNANCY, ANTEPARTUM: Status: ACTIVE | Noted: 2020-12-07

## 2020-12-07 LAB
-: NORMAL
ABO/RH: NORMAL
ABSOLUTE EOS #: 0.1 K/UL (ref 0–0.4)
ABSOLUTE IMMATURE GRANULOCYTE: ABNORMAL K/UL (ref 0–0.3)
ABSOLUTE LYMPH #: 1.4 K/UL (ref 1–4.8)
ABSOLUTE MONO #: 0.5 K/UL (ref 0.1–1.3)
AMORPHOUS: NORMAL
AMPHETAMINE SCREEN URINE: NEGATIVE
ANTIBODY SCREEN: NEGATIVE
BACTERIA: NORMAL
BARBITURATE SCREEN URINE: NEGATIVE
BASOPHILS # BLD: 0 % (ref 0–2)
BASOPHILS ABSOLUTE: 0 K/UL (ref 0–0.2)
BENZODIAZEPINE SCREEN, URINE: NEGATIVE
BILIRUBIN URINE: NEGATIVE
BUPRENORPHINE URINE: NORMAL
CANNABINOID SCREEN URINE: NEGATIVE
CASTS UA: NORMAL /LPF (ref 0–8)
COCAINE METABOLITE, URINE: NEGATIVE
COLOR: YELLOW
COMMENT UA: ABNORMAL
CONTROL: PRESENT
CRYSTALS, UA: NORMAL /HPF
DIFFERENTIAL TYPE: ABNORMAL
DIRECT EXAM: NORMAL
EOSINOPHILS RELATIVE PERCENT: 1 % (ref 0–4)
EPITHELIAL CELLS UA: NORMAL /HPF (ref 0–5)
GLUCOSE URINE: NEGATIVE
HCT VFR BLD CALC: 36.4 % (ref 36–46)
HEMOGLOBIN: 12.6 G/DL (ref 12–16)
HEPATITIS B SURFACE ANTIGEN: NONREACTIVE
HIV AG/AB: NONREACTIVE
IMMATURE GRANULOCYTES: ABNORMAL %
KETONES, URINE: NEGATIVE
LEUKOCYTE ESTERASE, URINE: ABNORMAL
LYMPHOCYTES # BLD: 23 % (ref 24–44)
Lab: NORMAL
MCH RBC QN AUTO: 29.9 PG (ref 26–34)
MCHC RBC AUTO-ENTMCNC: 34.8 G/DL (ref 31–37)
MCV RBC AUTO: 85.9 FL (ref 80–100)
MDMA URINE: NORMAL
METHADONE SCREEN, URINE: NEGATIVE
METHAMPHETAMINE, URINE: NORMAL
MONOCYTES # BLD: 8 % (ref 1–7)
MUCUS: NORMAL
NITRITE, URINE: NEGATIVE
NRBC AUTOMATED: ABNORMAL PER 100 WBC
OPIATES, URINE: NEGATIVE
OTHER OBSERVATIONS UA: NORMAL
OXYCODONE SCREEN URINE: NEGATIVE
PDW BLD-RTO: 13.9 % (ref 11.5–14.9)
PH UA: 7 (ref 5–8)
PHENCYCLIDINE, URINE: NEGATIVE
PLATELET # BLD: 250 K/UL (ref 150–450)
PLATELET ESTIMATE: ABNORMAL
PMV BLD AUTO: 9.4 FL (ref 6–12)
PREGNANCY TEST URINE, POC: POSITIVE
PROPOXYPHENE, URINE: NORMAL
PROTEIN UA: NEGATIVE
RBC # BLD: 4.23 M/UL (ref 4–5.2)
RBC # BLD: ABNORMAL 10*6/UL
RBC UA: NORMAL /HPF (ref 0–4)
RENAL EPITHELIAL, UA: NORMAL /HPF
RUBV IGG SER QL: 85 IU/ML
SEG NEUTROPHILS: 68 % (ref 36–66)
SEGMENTED NEUTROPHILS ABSOLUTE COUNT: 4.2 K/UL (ref 1.3–9.1)
SPECIFIC GRAVITY UA: 1.01 (ref 1–1.03)
SPECIMEN DESCRIPTION: NORMAL
T. PALLIDUM, IGG: NONREACTIVE
TEST INFORMATION: NORMAL
THYROXINE, FREE: 1.28 NG/DL (ref 0.93–1.7)
TRICHOMONAS: NORMAL
TRICYCLIC ANTIDEPRESSANTS, UR: NORMAL
TSH SERPL DL<=0.05 MIU/L-ACNC: 3.1 MIU/L (ref 0.3–5)
TURBIDITY: CLEAR
URINE HGB: NEGATIVE
UROBILINOGEN, URINE: NORMAL
WBC # BLD: 6.2 K/UL (ref 3.5–11)
WBC # BLD: ABNORMAL 10*3/UL
WBC UA: NORMAL /HPF (ref 0–5)
YEAST: NORMAL

## 2020-12-07 PROCEDURE — 86850 RBC ANTIBODY SCREEN: CPT

## 2020-12-07 PROCEDURE — 81025 URINE PREGNANCY TEST: CPT | Performed by: OBSTETRICS & GYNECOLOGY

## 2020-12-07 PROCEDURE — 87389 HIV-1 AG W/HIV-1&-2 AB AG IA: CPT

## 2020-12-07 PROCEDURE — 86762 RUBELLA ANTIBODY: CPT

## 2020-12-07 PROCEDURE — 85025 COMPLETE CBC W/AUTO DIFF WBC: CPT

## 2020-12-07 PROCEDURE — 84439 ASSAY OF FREE THYROXINE: CPT

## 2020-12-07 PROCEDURE — 99213 OFFICE O/P EST LOW 20 MIN: CPT | Performed by: OBSTETRICS & GYNECOLOGY

## 2020-12-07 PROCEDURE — 87340 HEPATITIS B SURFACE AG IA: CPT

## 2020-12-07 PROCEDURE — 86780 TREPONEMA PALLIDUM: CPT

## 2020-12-07 PROCEDURE — 86901 BLOOD TYPING SEROLOGIC RH(D): CPT

## 2020-12-07 PROCEDURE — 86900 BLOOD TYPING SEROLOGIC ABO: CPT

## 2020-12-07 PROCEDURE — 90471 IMMUNIZATION ADMIN: CPT | Performed by: OBSTETRICS & GYNECOLOGY

## 2020-12-07 PROCEDURE — 90686 IIV4 VACC NO PRSV 0.5 ML IM: CPT | Performed by: OBSTETRICS & GYNECOLOGY

## 2020-12-07 PROCEDURE — 36415 COLL VENOUS BLD VENIPUNCTURE: CPT

## 2020-12-07 PROCEDURE — 84443 ASSAY THYROID STIM HORMONE: CPT

## 2020-12-07 RX ORDER — LEVOTHYROXINE SODIUM 0.07 MG/1
75 TABLET ORAL DAILY
Qty: 30 TABLET | Refills: 1 | Status: SHIPPED | OUTPATIENT
Start: 2020-12-07 | End: 2020-12-16 | Stop reason: SDUPTHER

## 2020-12-07 NOTE — PROGRESS NOTES
After obtaining consent, and per orders of Dr. Chris Ellis, injection of Influenza  given in Right deltoid by Shawanda Escalera. Patient instructed to remain in clinic for 20 minutes afterwards, and to report any adverse reaction to me immediately.

## 2020-12-07 NOTE — PROGRESS NOTES
Alcohol use: No     Alcohol/week: 0.0 standard drinks    Drug use: No    Sexual activity: Yes     Partners: Male   Lifestyle    Physical activity     Days per week: Not on file     Minutes per session: Not on file    Stress: Not on file   Relationships    Social connections     Talks on phone: Not on file     Gets together: Not on file     Attends Cheondoism service: Not on file     Active member of club or organization: Not on file     Attends meetings of clubs or organizations: Not on file     Relationship status: Not on file    Intimate partner violence     Fear of current or ex partner: Not on file     Emotionally abused: Not on file     Physically abused: Not on file     Forced sexual activity: Not on file   Other Topics Concern    Not on file   Social History Narrative    Not on file     No Known Allergies  Family History   Problem Relation Age of Onset    Breast Cancer Neg Hx     Colon Cancer Neg Hx     Ovarian Cancer Neg Hx     Uterine Cancer Neg Hx        ROS:  Constitutional:  Denies fever or chills, fatigue  Eyes:  Denies change in visual acuity, blurred vision, itching  HENT:  Denies nasal congestion or sore throat   Respiratory:  Denies cough or shortness of breath, difficulty breathing  Cardiovascular:  Denies chest pain or edema  GI:  Denies abdominal pain, nausea, vomiting, bloody stools or diarrhea   :  Denies dysuria, frequency, urgency  Musculoskeletal:  Denies back pain or joint pain   Integument:  Denies rash, itching, dryness  Neurologic:  Denies headache, focal weakness or sensory changes   Endocrine:  Denies polyuria or polydipsia,    Lymphatic:  Denies swollen glands,   Psychiatric:  Denies depression or anxiety       Physical findings: HEENT - Perrla, Eomi  Neck- Supple, no bruits  Lungs - Clear to auscultation.   CV- Regular rate and rythym,   Abdomen - Non tender, non distended, no masses  Extremities - no weakness, no calf pain, no edema, no posterior tibial pain  Pelvis - No external lesions or erythema, vaginal mucosa pink and moist, no blood or discharge in the vault, cervix visually closed without lesions or erythema, no cervical motion tenderness, no bladder tenderness, no adnexal tenderness or masses appreciated, uterus about 6-8 weeks size        Assessment/Plan:  Patient Active Problem List   Diagnosis    PCOS (polycystic ovarian syndrome)    Influenza vaccine needed    h/o  x1    Amenorrhea, unspecified    Abnormal uterine bleeding (AUB)    Hypothyroidism    Pregnancy conceived with femara        Diagnosis Orders   1. Amenorrhea  Urine Drug Screen    POCT urine pregnancy    VAGINITIS DNA PROBE    Urinalysis    HIV Screen    Prenatal Profile    Culture, Urine    Chlamydia Trachomatis & Neisseria gonorrhoeae (GC) by amplified detection    US OB LESS THAN 14 WEEKS SINGLE OR FIRST GESTATION    US OB TRANSVAGINAL   2. Positive pregnancy test  Urine Drug Screen    POCT urine pregnancy    VAGINITIS DNA PROBE    Urinalysis    HIV Screen    Prenatal Profile    Culture, Urine    Chlamydia Trachomatis & Neisseria gonorrhoeae (GC) by amplified detection    US OB LESS THAN 14 WEEKS SINGLE OR FIRST GESTATION    US OB TRANSVAGINAL   3. PCOS (polycystic ovarian syndrome)  Glucose tolerance, 1 hour   4. Influenza vaccine needed  HI INJECTION,THERAP/PROPH/DIAGNOST, IM OR SUBCUT    INFLUENZA, QUADV, 3 YRS AND OLDER, IM PF, PREFILL SYR OR SDV, 0.5ML (AFLURIA QUADV, PF)   5. History of  section     6. Hypothyroidism, unspecified type  TSH without Reflex    T4, Free       Pap smear with cotesting due 2025 per ASCCP guidelines. Vaginal cultures collected and sent. Routine prenatal labs, early 1h GTT, thyroid studies and dating/viability scan ordered. Discussed recommendation for influenza vaccination in pregnancy. Patient desires flu shot today. Reviewed physician collaboration with the midwives.  Patient desires to transfer to physicians due to need for OI and h/o prior  section. Desires genetic testing with NIPT and AFP. Discussed in great detail the growing concerns and associated hospital/institutional plans concerning COVID-19. Reviewed current available evidence and recommendations concerning outpatient and inpatient antepartum care in light of the pandemic. Discussed specific available CDC/ACOG/[facility] advisories including utilization of telehealth and limited inpatient admissions when able. Encouraged social distancing and appropriate hand washing/hygiene practices. Reviewed symptoms suspicious for infection. All questions answered to the best of my ability. Patient vocalized understanding. Return in about 4 weeks (around 2021) for OB Hx.     Francesca Hester DO   [address] [place] [address]  2020 10:33 AM

## 2020-12-08 ENCOUNTER — TELEPHONE (OUTPATIENT)
Dept: OBGYN CLINIC | Age: 30
End: 2020-12-08

## 2020-12-08 LAB
C TRACH DNA GENITAL QL NAA+PROBE: NEGATIVE
CULTURE: NORMAL
Lab: NORMAL
N. GONORRHOEAE DNA: NEGATIVE
SPECIMEN DESCRIPTION: NORMAL
SPECIMEN DESCRIPTION: NORMAL

## 2020-12-08 NOTE — TELEPHONE ENCOUNTER
Pharmacy called on Synthroid medication they need confirmation that the dose was increased from 50 mg to 75 mg and also want to see if the prescription can be for 90 days.  She said we can either change it in the comment section or give them a call back at [phone]

## 2020-12-09 ENCOUNTER — HOSPITAL ENCOUNTER (OUTPATIENT)
Dept: ULTRASOUND IMAGING | Age: 30
Discharge: HOME | End: 2020-12-11
Payer: COMMERCIAL

## 2020-12-09 PROCEDURE — 76801 OB US < 14 WKS SINGLE FETUS: CPT

## 2020-12-09 PROCEDURE — 76817 TRANSVAGINAL US OBSTETRIC: CPT

## 2020-12-16 ENCOUNTER — TELEPHONE (OUTPATIENT)
Dept: OBGYN CLINIC | Age: 30
End: 2020-12-16

## 2020-12-16 RX ORDER — LEVOTHYROXINE SODIUM 0.07 MG/1
75 TABLET ORAL DAILY
Qty: 30 TABLET | Refills: 0 | Status: SHIPPED | OUTPATIENT
Start: 2020-12-16 | End: 2021-07-13 | Stop reason: SDUPTHER

## 2020-12-16 NOTE — TELEPHONE ENCOUNTER
Pt called there is a delay with her BC being delivered and she is out she was wondering  If she can get one refill sent to [facility] because they are not sure how long the delay will be for

## 2021-01-20 ENCOUNTER — TELEPHONE (OUTPATIENT)
Dept: OBGYN CLINIC | Age: 31
End: 2021-01-20

## 2021-01-20 ENCOUNTER — HOSPITAL ENCOUNTER (OUTPATIENT)
Age: 31
Discharge: HOME | End: 2021-01-20
Payer: COMMERCIAL

## 2021-01-20 DIAGNOSIS — E03.9 HYPOTHYROIDISM, UNSPECIFIED TYPE: ICD-10-CM

## 2021-01-20 LAB
THYROXINE, FREE: 1.33 NG/DL (ref 0.93–1.7)
TSH SERPL DL<=0.05 MIU/L-ACNC: 1.36 MIU/L (ref 0.3–5)

## 2021-01-20 PROCEDURE — 36415 COLL VENOUS BLD VENIPUNCTURE: CPT

## 2021-01-20 PROCEDURE — 84443 ASSAY THYROID STIM HORMONE: CPT

## 2021-01-20 PROCEDURE — 84439 ASSAY OF FREE THYROXINE: CPT

## 2021-01-20 NOTE — TELEPHONE ENCOUNTER
Pt called she has an appt 02/09 for ob hx she wants to know I this is okay because she was orginally supposed to schedule 01/04 and forgot

## 2021-01-29 ENCOUNTER — INITIAL PRENATAL (OUTPATIENT)
Dept: OBGYN CLINIC | Age: 31
End: 2021-01-29

## 2021-01-29 ENCOUNTER — HOSPITAL ENCOUNTER (OUTPATIENT)
Age: 31
Setting detail: SPECIMEN
Discharge: HOME | End: 2021-01-29
Payer: COMMERCIAL

## 2021-01-29 VITALS
BODY MASS INDEX: 25.42 KG/M2 | SYSTOLIC BLOOD PRESSURE: 108 MMHG | WEIGHT: 139 LBS | DIASTOLIC BLOOD PRESSURE: 66 MMHG | HEART RATE: 74 BPM

## 2021-01-29 DIAGNOSIS — Z98.891 HISTORY OF CESAREAN SECTION: ICD-10-CM

## 2021-01-29 DIAGNOSIS — Z3A.15 15 WEEKS GESTATION OF PREGNANCY: ICD-10-CM

## 2021-01-29 DIAGNOSIS — Z34.92 PRENATAL CARE IN SECOND TRIMESTER: Primary | ICD-10-CM

## 2021-01-29 DIAGNOSIS — E03.9 HYPOTHYROIDISM, UNSPECIFIED TYPE: ICD-10-CM

## 2021-01-29 DIAGNOSIS — E28.2 PCOS (POLYCYSTIC OVARIAN SYNDROME): Chronic | ICD-10-CM

## 2021-01-29 DIAGNOSIS — Z3A.15 15 WEEKS GESTATION OF PREGNANCY: Primary | ICD-10-CM

## 2021-01-29 DIAGNOSIS — O09.90 HIGH RISK PREGNANCY, ANTEPARTUM: ICD-10-CM

## 2021-01-29 PROCEDURE — 0501F PRENATAL FLOW SHEET: CPT | Performed by: OBSTETRICS & GYNECOLOGY

## 2021-01-29 NOTE — PROGRESS NOTES
Patient Active Problem List   Diagnosis    PCOS (polycystic ovarian syndrome)    Influenza vaccine needed    h/o  x1    Amenorrhea, unspecified    Abnormal uterine bleeding (AUB)    Hypothyroidism    Pregnancy conceived with femara     Blood pressure 108/66, pulse 74, weight 139 lb (63 kg), last menstrual period 10/16/2020, not currently breastfeeding. Wendy Miller is a 31 y.o.  at 15w0d, here for her ACOG. The patients past medical, surgical, social and family history were reviewed. Current medications and allergies were reviewed, and documented in the chart. -LOF, -VB, - abdominal pain. She denies any fevers/chills, SOB, cough, sore throat, myalgias, n/v, loss of taste/smell or sick contacts. She states she was going to do her 1h GTT when she did her thyroid testing but went to the lab too close to closing time. She will try to get it done in the next few weeks.     Menstrual history: menarche at 15yo, previously irregular due to PCOS  Birth control: OCPs    Wt Readings from Last 3 Encounters:   21 139 lb (63 kg)   20 134 lb 12.8 oz (61.1 kg)   20 131 lb 3.2 oz (59.5 kg)     Recent Results (from the past 8736 hour(s))   TSH    Collection Time: 20  2:23 PM   Result Value Ref Range    TSH 3.08 0.30 - 5.00 mIU/L   T4, Free    Collection Time: 20  2:23 PM   Result Value Ref Range    Thyroxine, Free 1.14 0.93 - 1.70 ng/dL   T4, Free    Collection Time: 07/15/20  6:40 PM   Result Value Ref Range    Thyroxine, Free 2.10 (H) 0.93 - 1.70 ng/dL   TSH    Collection Time: 07/15/20  6:40 PM   Result Value Ref Range    TSH 0.10 (L) 0.30 - 5.00 mIU/L   T4, Free    Collection Time: 20  5:25 PM   Result Value Ref Range    Thyroxine, Free 1.48 0.93 - 1.70 ng/dL   TSH without Reflex    Collection Time: 20  5:25 PM   Result Value Ref Range    TSH 1.21 0.30 - 5.00 mIU/L   POCT urine pregnancy    Collection Time: 20 10:00 AM   Result Value Ref Range    Preg Test, Ur negative     Control present    HPV, High Risk with Genotyping    Collection Time: 09/04/20 10:55 AM   Result Value Ref Range    HPV SOURCE HPV ON THINPREP FROM CERVIX     HPV, Genotype 16 Not Detected     HPV, High Risk Other Not Detected     HPV, Genotype 18 Not Detected    GYN Cytology    Collection Time: 09/04/20 11:57 AM   Result Value Ref Range    Cytology Report       INTERPRETATION    Cervical material, (ThinPrep vial, Imaging-assisted review):  Specimen Adequacy:       Satisfactory for evaluation.       - Endocervical/transformation zone component present. Descriptive Diagnosis:       Negative for intraepithelial lesion or malignancy. Comments:       High Risk HPV testing was ordered. Cytotechnologist:   Vani MENA(ASCP)  **Electronically Signed Out**  ey/9/15/2020          Procedure/Addendum  HPV Procedure Report     Date Ordered:     9/8/2020     Status: Signed  Out       Date Complete:     9/8/2020     By: RAJESH Irwin(ASCP)       Date Reported:     9/17/2020       INTERPRETATION  Aptima HPV RNA High Risk                                  HPV Sample               Thin Prep                    (Ref Range)  HPV Type 16               Not Detected                    (Not  Detected)  HPV Type 18               Not Detected                    (Not  Detected)  Other High Risk HPV     Not Detected                    (Not Detected)       This t est detects high-risk HPV types (16, 18, 31, 33, 35, 39, 45,  51, 52, 56, 58, 59, 66, and 68) and differentiates HPV 16 and 18  associated with cervical cancer and its precursor lesions. Sensitivity may be affected by specimen collection methods, stage of  infection, and the presence of interfering substances. Results should  be interpreted in conjunction with other available laboratory and  clinical data.   A negative high-risk HPV result does not exclude the  presence of other high-risk HPV types, the possibility of future  cytologic abnormalities, underlying CIN2-3 or cancer. This test is intended for medical purposes only and is not valid for  the evaluation of suspected sexual abuse or for other forensic  purposes. HPV testing should not be used for screening or management  of atypical squamous cells of undetermined significance (ASCUS) in  women under age 24. Source:  1: Cervical material, (ThinPrep vial, Imaging-assisted review)    Clinical History  Z01.419  Routine gyn exam without abnormal findings  Co-Test:  ThinPrep Pap with high risk HPV testing    GYNECOLOGIC CYTOLOGY REPORT    Patient Name: Diana Marion [facility] [address]: [id number]  Path Number: [id number]  [facility] [address]   [place], [address]  [phone]  Fax: [phone]     POCT urine pregnancy    Collection Time: 12/07/20 10:30 AM   Result Value Ref Range    Preg Test, Ur positive     Control present    TSH without Reflex    Collection Time: 12/07/20 11:35 AM   Result Value Ref Range    TSH 3.10 0.30 - 5.00 mIU/L   T4, Free    Collection Time: 12/07/20 11:35 AM   Result Value Ref Range    Thyroxine, Free 1.28 0.93 - 1.70 ng/dL   HIV Screen    Collection Time: 12/07/20 11:35 AM   Result Value Ref Range    HIV Ag/Ab NONREACTIVE NONREACTIVE   PRENATAL PROFILE I    Collection Time: 12/07/20 11:35 AM   Result Value Ref Range    WBC 6.2 3.5 - 11.0 k/uL    RBC 4.23 4.0 - 5.2 m/uL    Hemoglobin 12.6 12.0 - 16.0 g/dL    Hematocrit 36.4 36 - 46 %    MCV 85.9 80 - 100 fL    MCH 29.9 26 - 34 pg    MCHC 34.8 31 - 37 g/dL    RDW 13.9 11.5 - 14.9 %    Platelets 707 915 - 165 k/uL    MPV 9.4 6.0 - 12.0 fL    NRBC Automated NOT REPORTED per 100 WBC    Differential Type NOT REPORTED     Seg Neutrophils 68 (H) 36 - 66 %    Lymphocytes 23 (L) 24 - 44 %    Monocytes 8 (H) 1 - 7 %    Eosinophils % 1 0 - 4 %    Basophils 0 0 - 2 %    Immature Granulocytes NOT REPORTED 0 %    Segs Absolute 4. 20 1.3 - 9.1 k/uL    Absolute Lymph # 1.40 1.0 - 4.8 k/uL    Absolute Mono # 0.50 0.1 - 1.3 k/uL    Absolute Eos # 0.10 0.0 - 0.4 k/uL    Basophils Absolute 0.00 0.0 - 0.2 k/uL    Absolute Immature Granulocyte NOT REPORTED 0.00 - 0.30 k/uL    WBC Morphology NOT REPORTED     RBC Morphology NOT REPORTED     Platelet Estimate NOT REPORTED     Hepatitis B Surface Ag NONREACTIVE NONREACTIVE    Rubella Antibody, IGG 85.0 IU/mL    T. pallidum, IgG NONREACTIVE NONREACTIVE   PRENATAL TYPE AND SCREEN    Collection Time: 12/07/20 11:36 AM   Result Value Ref Range    ABO/Rh O POSITIVE     Antibody Screen NEGATIVE    Culture, Urine    Collection Time: 12/07/20  6:18 PM    Specimen: Urine, clean catch   Result Value Ref Range    Specimen Description . CLEAN CATCH URINE     Special Requests NOT REPORTED     Culture NO SIGNIFICANT GROWTH    VAGINITIS DNA PROBE    Collection Time: 12/07/20  6:18 PM    Specimen: Vaginal   Result Value Ref Range    Specimen Description . VAGINA     Special Requests NOT REPORTED     Direct Exam NEGATIVE for Gardnerella vaginalis     Direct Exam NEGATIVE for Candida sp. Direct Exam NEGATIVE for Trichomonas vaginalis     Direct Exam       Method of testing is a DNA probe intended for detection and identification of Candida species, Gardnerella vaginalis, and Trichomonas vaginalis nucleic acid in vaginal fluid specimens from patients with symptoms of vaginitis/vaginosis.    Urinalysis    Collection Time: 12/07/20  6:19 PM   Result Value Ref Range    Color, UA YELLOW YELLOW    Turbidity UA CLEAR CLEAR    Glucose, Ur NEGATIVE NEGATIVE    Bilirubin Urine NEGATIVE NEGATIVE    Ketones, Urine NEGATIVE NEGATIVE    Specific Gravity, UA 1.008 1.005 - 1.030    Urine Hgb NEGATIVE NEGATIVE    pH, UA 7.0 5.0 - 8.0    Protein, UA NEGATIVE NEGATIVE    Urobilinogen, Urine Normal Normal    Nitrite, Urine NEGATIVE NEGATIVE    Leukocyte Esterase, Urine MODERATE (A) NEGATIVE    Urinalysis Comments NOT REPORTED    Urine Drug Screen    Collection Time: 12/07/20  6:19 PM   Result Value Ref Range    Amphetamine Screen, Ur NEGATIVE NEGATIVE    Barbiturate Screen, Ur NEGATIVE NEGATIVE    Benzodiazepine Screen, Urine NEGATIVE NEGATIVE    Cocaine Metabolite, Urine NEGATIVE NEGATIVE    Methadone Screen, Urine NEGATIVE NEGATIVE    Opiates, Urine NEGATIVE NEGATIVE    Phencyclidine, Urine NEGATIVE NEGATIVE    Propoxyphene, Urine NOT REPORTED NEGATIVE    Cannabinoid Scrn, Ur NEGATIVE NEGATIVE    Oxycodone Screen, Ur NEGATIVE NEGATIVE    Methamphetamine, Urine NOT REPORTED NEGATIVE    Tricyclic Antidepressants, Urine NOT REPORTED NEGATIVE    MDMA, Urine NOT REPORTED NEGATIVE    Buprenorphine Urine NOT REPORTED NEGATIVE    Test Information       Assay provides medical screening only. The absence of expected drug(s) and/or metabolite(s) may indicate diluted or adulterated urine, limitations of testing or timing of collection. Microscopic Urinalysis    Collection Time: 12/07/20  6:19 PM   Result Value Ref Range    -          WBC, UA None 0 - 5 /HPF    RBC, UA 2 TO 5 0 - 4 /HPF    Casts UA  0 - 8 /LPF     0 TO 2 HYALINE Reference range defined for non-centrifuged specimen. Crystals, UA NOT REPORTED None /HPF    Epithelial Cells UA 2 TO 5 0 - 5 /HPF    Renal Epithelial, UA NOT REPORTED 0 /HPF    Bacteria, UA NOT REPORTED None    Mucus, UA NOT REPORTED None    Trichomonas, UA NOT REPORTED None    Amorphous, UA NOT REPORTED None    Other Observations UA NOT REPORTED NOT REQ. Yeast, UA NOT REPORTED None   Chlamydia Trachomatis & Neisseria gonorrhoeae (GC) by amplified detection    Collection Time: 12/07/20  6:20 PM    Specimen: Cervix   Result Value Ref Range    Specimen Description . CERVIX     C. trachomatis DNA NEGATIVE NEGATIVE    N. gonorrhoeae DNA NEGATIVE NEGATIVE   T4, Free    Collection Time: 01/20/21  4:59 PM   Result Value Ref Range    Thyroxine, Free 1.33 0.93 - 1.70 ng/dL   TSH without Reflex    Collection Time: 01/20/21  4:59 PM Result Value Ref Range    TSH 1.36 0.30 - 5.00 mIU/L       Past Medical History:   Diagnosis Date    Hypothyroidism     PCOS (polycystic ovarian syndrome)                                                                    Past Surgical History:   Procedure Laterality Date     SECTION  2016     Family History   Problem Relation Age of Onset    Diabetes Maternal Grandmother     Breast Cancer Neg Hx     Colon Cancer Neg Hx     Ovarian Cancer Neg Hx     Uterine Cancer Neg Hx      Social History     Tobacco Use   Smoking Status Never Smoker   Smokeless Tobacco Never Used     Social History     Substance and Sexual Activity   Alcohol Use No    Alcohol/week: 0.0 standard drinks       MEDICATIONS:  Current Outpatient Medications   Medication Sig Dispense Refill    metFORMIN (GLUCOPHAGE) 500 MG tablet Take 1 tablet by mouth daily (with breakfast) 90 tablet 1    levothyroxine (SYNTHROID) 75 MCG tablet Take 1 tablet by mouth daily 30 tablet 0    Prenatal Multivit-Min-Fe-FA (PRENATAL VITAMINS PO) Take 1 tablet by mouth daily       No current facility-administered medications for this visit. ALLERGIES:  Patient has no known allergies. Reviewed global and practice OB care including nausea measures, nutrition, activities, warning signs, and contact information.  Offered cell free DNA screen,NT echo and WIC .    --------------------------------------------------------------------------  Genetic Screening/Teratology Counseling  (Include patient, FOB or anyone in either family)    1) Patient's age 28 years or > at KELLY: No  2) Thalassemia (Mediterranean, ): No  3) Neural Tube Defect:   No  4) Congenital heart defect:   No  5) Trisomy (e.g. Down Syndrome):  No  6) Devaughn-sachs (Tenriism, [address]): No  7) Multiple Births:    No  8) Sickle cell (disease or trait):  No  9) Hemophilia or blood disorders:  No  10) Muscular Dystrophy:   No  11) Cystic Fibrosis:    No  12) [place]'s chorea:   No  13) Mental retardation/Autism :  No   If yes, was person tested for fragile X: No  14) Other inherited genetic/chromosomal disorder: No  15) Maternal metabolic disorder (DM, PKU): No  12) Child with birth defect not listed:  No  17) Recurrent pregnancy loss/stillbirth: No  18) Medications, supplements/illicit or   Recreational drugs/alcohol since LMP: yes   List: Provera and femara  23) Any other:   none    -------------------------------------------------------------------------  Infection History:    1) Live with someone with TB/exposed to TB: No  2) Patient/partner has h/o genital herpes: No  3) Rash/viral illness since LMP:  No  4) History of STD:    No  5) Other: No  -------------------------------------------------------------------------      INITIAL OBSTETRICAL VISIT COUNSELING:  The patient was seen full history and physical was completed/reviewed. Cytology was collected for patients over 24years of age. Cultures were collected. The patient was counseled on office policies and she was counseled on termination of pregnancy in the state of [place]. The patient was counseled on Toxoplasmosis, HIV, Tobacco Abuse, Group Beta Strep Infections, Cystic Fibrosis,  Labor precautions and Sickle Cell disease. The patient was counseled on the risks of tobacco abuse. Both maternal and fetal. She was instructed to stop smoking if currently using tobacco. Morbidity, mortality, and cessation programs were reviewed. The risks include but are not limited to increased risks of  labor,  delivery, premature rupture of membranes, intrauterine growth restriction, intrauterine fetal demise and abruptio placenta. Secondary smoke risks were also reviewed. Increases in cancer, respiratory problems, and sudden infant death syndrome were reviewed as well. The patient was informed of a 2-4% risk of congenital anomalies in the general population.  She was also informed that karyotyping is the only way to evaluate the fetus for genetic problems and genetic lethal anomalies. Chorionic villous sampling, amniocentesis and Maternal Genetic Blood Sampling-(NIPT Testing) were also discussed with morbidity rates in detail. She desires NIPT. Route of delivery and counseling on vaginal, operative vaginal, and  sections were completed with the risks of each to both the patient as well as her baby. The possibility of a blood transfusion was discussed as well. The patient was not opposed to receiving a transfusion if needed. Nuchal translucency/Quad Evaluation and MSAFP single marker testing was reviewed in detail with attention to timing of testing and their windows. For patients beyond the gestational age for Nuchal translucency evaluation Quad testing was recommended. Timing for the Quad test was reviewed. Benefits of the above testing was reviewed. A second trimester amniocentesis was also made available to the patient. Risks, Benefits and non-invasive alternative testing was reviewed. The patient was questioned in detail regarding any genetic misnomer history, chromosomal abnormalities, or learning disabilities in  herself, the father of the baby or their families. SHE DENIED ANY HISTORY AS STATED ABOVE: yes    Genetic testing desired with NIPT and AFP. Orders placed. MFM referral placed for anatomy scan and consultation (PCOS, pregnancy conceived with femara, hypothyroidism, h/o  x1). Ecnouraged compliance with early 1h GTT. Thyroid studies ordered for next month. Will trend q 4 wks. Patient counseled extensively regarding risks, benefits, and alternatives to  including approximately 0.5-0.9% risk of uterine rupture and risk of failure necessitating c/s.  Advised that successful  is associated with fewer complications than elective repeat c/s, but that failed TOLAC is associated with more complications (complications include endometritis, operative injury, blood transfusion, hysterectomy, uterine rupture, maternal death). Patient advised that neonates can be divided into three groups. (1) Successful  group- have lowest risk of  mortality, NICU admission, respiratory morbidity and transient tachypnea (2) Repeat c/s group- have similiarly low rates of  mortality, NICU admission, slightly increased respiratory morbidity and transient tachypnea (3) Failed TOLAC necessitating c/s group- highest rates of HIE,  mortality, and respiratory morbidity. Patient counseled, according to [facility] calculator ([email or web address]://[facility]/[email or web address]) is 43.5%. Reviewed that success rate is likely decreased with IOL. She is uncertain at this time as to what she would like to do and would like to see how things progress. Discussed updated COVID precautions and policies, including but not limited to outpatient testing 3-4 days prior to scheduled delivery or universal rapid screening on L&D for unscheduled delivery (unless previously positive). Reviewed limited staff and no visitors if symptomatic and COVID positive. Reviewed that one asymptomatic support person may be present if patient is COVID positive and asymptomatic. Discussed that two support people are allowed when COVID negative. Encouraged social distancing and appropriate hand washing/hygiene practices. Reviewed symptoms suspicious for COVID infection. Discussed that ACOG, SMFM, and the CDC recommend to not withold immunization in pregnant and breastfeeding women who meet criteria for receipt of the vaccine based on the ACIP reccommended priority groups. All questions answered. Patient vocalized understanding. Follow up in 4 weeks. Upon completion of the visit all questions were answered and the patients follow-up and testing schedule were reviewed.     DO [facility] [place]  2021 12:18 PM

## 2021-01-29 NOTE — Clinical Note
Please place [facility] referral for anatomy scan and consultation (PCOS, pregnancy conceived with femara, hypothyroidism, h/o  x1). Thank you!

## 2021-01-29 NOTE — PATIENT INSTRUCTIONS
Exercise    · If you did not exercise much before pregnancy, start slowly. Walking is best. [employer], and do a little more every day.     · Brisk walking, easy jogging, low-impact aerobics, water aerobics, and yoga are good choices. Some sports, such as scuba diving, horseback riding, downhill skiing, gymnastics, and water skiing, are not a good idea.     · Try to do at least 2½ hours a week of moderate exercise, such as a fast walk. One way to do this is to be active 30 minutes a day, at least 5 days a week. It's fine to be active in blocks of 10 minutes or more throughout your day and week.     · Wear loose clothing. And wear shoes and a bra that provide good support.     · Warm up and cool down to start and finish your exercise.     · If you want to use weights, be sure to use light weights. They reduce stress on your joints. Stay at the best weight for you    · Experts recommend that you gain about 1 pound a month during the first 3 months of your pregnancy.     · Experts recommend that you gain about 1 pound a week during your last 6 months of pregnancy, for a total weight gain of 25 to 35 pounds.     · If you are underweight, you will need to gain more weight (about 28 to 40 pounds).     · If you are overweight, you may not need to gain as much weight (about 15 to 25 pounds).     · If you are gaining weight too fast, use common sense. Exercise every day, and limit sweets, fast foods, and fats. Choose lean meats, fruits, and vegetables.     · If you are having twins or more, your doctor may refer you to a dietitian. Where can you learn more? Go to https://[employer].healthPaytrail. org and sign in to your [employer] account. Enter W021 in the [employer] box to learn more about \"Weeks 14 to 18 of Your Pregnancy: Care Instructions. \"     If you do not have an account, please click on the \"Sign Up Now\" link.   Current as of: February 11, 2020               Content Version: 12.6  © 9461-6014 Healthwise, Incorporated. Care instructions adapted under license by [facility] ([facility]). If you have questions about a medical condition or this instruction, always ask your healthcare professional. [address] any warranty or liability for your use of this information.

## 2021-02-01 LAB
AFP INTERPRETATION: NORMAL
AFP MOM: 0.64
AFP SPECIMEN: NORMAL
AFP: 19 NG/ML
DATE OF BIRTH: NORMAL
DATING METHOD: NORMAL
DETERMINED BY: NORMAL
DIABETIC: NO
DUE DATE: NORMAL
ESTIMATED DUE DATE: NORMAL
FAMILY HISTORY NTD: NO
GESTATIONAL AGE: NORMAL
INSULIN REQ DIABETES: NO
LAST MENSTRUAL PERIOD: NORMAL
MATERNAL AGE AT EDD: 32.1 YR
MATERNAL WEIGHT: 139
MONOCHORIONIC TWINS: NORMAL
NUMBER OF FETUSES: NORMAL
PATIENT WEIGHT UNITS: NORMAL
PATIENT WEIGHT: NORMAL
RACE (MATERNAL): NORMAL
RACE: NORMAL
REPEAT SPECIMEN?: NORMAL
SMOKING: NO
SMOKING: NO
VALPROIC/CARBAMAZEP: NO
ZZ NTE CLEAN UP: HISTORY: NO

## 2021-02-24 ENCOUNTER — HOSPITAL ENCOUNTER (OUTPATIENT)
Age: 31
Discharge: HOME | End: 2021-02-24
Payer: COMMERCIAL

## 2021-02-24 DIAGNOSIS — Z3A.15 15 WEEKS GESTATION OF PREGNANCY: ICD-10-CM

## 2021-02-24 DIAGNOSIS — E03.9 HYPOTHYROIDISM, UNSPECIFIED TYPE: ICD-10-CM

## 2021-02-24 LAB
THYROXINE, FREE: 1.35 NG/DL (ref 0.93–1.7)
TSH SERPL DL<=0.05 MIU/L-ACNC: 0.83 MIU/L (ref 0.3–5)

## 2021-02-24 PROCEDURE — 36415 COLL VENOUS BLD VENIPUNCTURE: CPT

## 2021-02-24 PROCEDURE — 84443 ASSAY THYROID STIM HORMONE: CPT

## 2021-02-24 PROCEDURE — 84439 ASSAY OF FREE THYROXINE: CPT

## 2021-02-26 ENCOUNTER — HOSPITAL ENCOUNTER (OUTPATIENT)
Age: 31
Discharge: HOME | End: 2021-02-26
Payer: COMMERCIAL

## 2021-02-26 DIAGNOSIS — R73.09 ABNORMAL GLUCOSE TOLERANCE TEST: Primary | ICD-10-CM

## 2021-02-26 DIAGNOSIS — E28.2 PCOS (POLYCYSTIC OVARIAN SYNDROME): Chronic | ICD-10-CM

## 2021-02-26 LAB
GLUCOSE ADMINISTRATION: ABNORMAL
GLUCOSE TOLERANCE SCREEN 50G: 143 MG/DL (ref 70–135)

## 2021-02-26 PROCEDURE — 36415 COLL VENOUS BLD VENIPUNCTURE: CPT

## 2021-02-26 PROCEDURE — 82950 GLUCOSE TEST: CPT

## 2021-03-01 ENCOUNTER — ROUTINE PRENATAL (OUTPATIENT)
Dept: OBGYN CLINIC | Age: 31
End: 2021-03-01

## 2021-03-01 VITALS
WEIGHT: 148 LBS | SYSTOLIC BLOOD PRESSURE: 91 MMHG | DIASTOLIC BLOOD PRESSURE: 56 MMHG | HEART RATE: 76 BPM | BODY MASS INDEX: 27.07 KG/M2

## 2021-03-01 DIAGNOSIS — Z3A.19 19 WEEKS GESTATION OF PREGNANCY: ICD-10-CM

## 2021-03-01 DIAGNOSIS — R73.09 ABNORMAL GLUCOSE TOLERANCE TEST: ICD-10-CM

## 2021-03-01 DIAGNOSIS — O09.90 HIGH RISK PREGNANCY, ANTEPARTUM: ICD-10-CM

## 2021-03-01 DIAGNOSIS — E03.9 HYPOTHYROIDISM, UNSPECIFIED TYPE: ICD-10-CM

## 2021-03-01 DIAGNOSIS — E28.2 PCOS (POLYCYSTIC OVARIAN SYNDROME): ICD-10-CM

## 2021-03-01 DIAGNOSIS — Z34.92 PRENATAL CARE IN SECOND TRIMESTER: Primary | ICD-10-CM

## 2021-03-01 DIAGNOSIS — Z98.891 HISTORY OF CESAREAN SECTION: ICD-10-CM

## 2021-03-01 PROCEDURE — 0502F SUBSEQUENT PRENATAL CARE: CPT | Performed by: OBSTETRICS & GYNECOLOGY

## 2021-03-01 NOTE — PROGRESS NOTES
Prenatal Visit    Amelia Issa is a 31 y.o. female  at 19w3d    The patient was seen and evaluated. Reports positive fetal movements. She denies headache, vision changes, RUQ pain, contractions, vaginal bleeding and leakage of fluid. She denies any fevers/chills, SOB, cough, sore throat, myalgias, n/v, loss of taste/smell or sick contacts. The patient already received the influenza vaccine this year. The problem list reflects the active issues addressed during today's visit    Vitals:     BP: (!) 91/56  Weight: 148 lb (67.1 kg)  Pulse: 76  Patient Position: Sitting  Fetal Heart Rate: 140  Movement: Increased     Assessment & Plan:  Amelia Issa is a 31 y.o. female  at 21w3d   - NIPT and a single marker MSAFP were reviewed and found to be normal.    - Encouraged compliance with early 3h GTT. Patient states she will complete it this Friday. - The patient's anatomy ultrasound has been [facility] for 3/8.   -  labor and kick count precautions given. - Signs and symptoms of preeclampsia reviewed. - Discussed updated COVID precautions and policies, including but not limited to outpatient testing 3-4 days prior to scheduled delivery or universal rapid screening on L&D for unscheduled delivery (unless previously positive). Reviewed limited staff and no visitors if symptomatic and COVID positive. Reviewed that one asymptomatic support person may be present if patient is COVID positive and asymptomatic. Discussed that two support people are allowed when COVID negative. Encouraged social distancing and appropriate hand washing/hygiene practices. Reviewed symptoms suspicious for COVID infection. Discussed that ACOG, SMFM, and the CDC recommend to not withold immunization in pregnant and breastfeeding women who meet criteria for receipt of the vaccine based on the ACIP recommended priority groups. All questions answered. Patient vocalized understanding.      Patient Active Problem List    Diagnosis Date Noted    h/o  x1 2016     Priority: Medium     Arrest of dilation and suspected OP presentation, LTCS   calculator 43.5%      PCOS (polycystic ovarian syndrome) 2014     Priority: Medium     Early 1h , 3h GTT ordered 21      Influenza vaccine needed 2016     Priority: Low     Given 20      Pregnancy conceived with femara 2020    Hypothyroidism      Will trend thyroid studies q 4 weeks      Abnormal uterine bleeding (AUB) 06/15/2019    Amenorrhea, unspecified 2018     Hx of same, desires conception       Return in about 4 weeks (around 3/29/2021) for MARY. The patient was instructed on fetal kick counts and was given a kick sheet to complete every 8 hours. This is to begin at 28 weeks gestation. She was instructed that the baby should move at a minimum of ten times within one hour after a meal. The patient was instructed to lay down on her left side twenty minutes after eating and count movements for up to one hour with a target value of ten movements. She was instructed to notify the office if she did not make that target after two attempts or if after any attempt there was less than four movements.     Mauro Hester, DO [facility] [place]  3/1/2021 8:59 AM

## 2021-03-01 NOTE — PATIENT INSTRUCTIONS
Patient Education        Weeks 18 to 22 of Your Pregnancy: Care Instructions  Your Care Instructions     Your baby is continuing to develop quickly. At this stage, babies can now suck their thumbs,  firmly with their hands, and open and close their eyelids. Sometime between 18 and 22 weeks, you will start to feel your baby move. At first, these small fetal movements feel like fluttering or \"butterflies. \" Some women say that they feel like gas bubbles. As the baby grows, these movements will become stronger. You may also notice that your baby kicks and hiccups. During this time, you may find that your nausea and fatigue are gone. Overall, you may feel better and have more energy than you did in your first trimester. But you may also have new discomforts now, such as sleep problems or leg cramps. This care sheet can help you ease these discomforts. Follow-up care is a key part of your treatment and safety. Be sure to make and go to all appointments, and call your doctor if you are having problems. It's also a good idea to know your test results and keep a list of the medicines you take. How can you care for yourself at home? Ease sleep problems  · Avoid caffeine in drinks or chocolate late in the day. · Get some exercise every day. · Take a warm shower or bath before bed. · Have a light snack or glass of milk at bedtime. · Do relaxation exercises in bed to calm your mind and body. · Support your legs and back with extra pillows. Try a pillow between your legs if you sleep on your side. · Do not use sleeping pills or alcohol. They could harm your baby. Ease leg cramps  · Do not massage your calf during the cramp. · Sit on a firm bed or chair. Straighten your leg, and bend your foot (flex your ankle) slowly upward, toward your knee. Bend your toes up and down. · Stand on a cool, flat surface. Stretch your toes upward, and take small steps walking on your heels.   · Use a heating pad or hot water bottle to help with muscle ache. Prevent leg cramps  · Be sure to get enough calcium. If you are worried that you are not getting enough, talk to your doctor. · Exercise every day, and stretch your legs before bed. · Take a warm bath before bed, and try leg warmers at night. Where can you learn more? Go to https://chpedanielle.healthDiscovery Labs. org and sign in to your Digilab account. Enter I821 in the Schoo box to learn more about \"Weeks 18 to 22 of Your Pregnancy: Care Instructions. \"     If you do not have an account, please click on the \"Sign Up Now\" link. Current as of: February 11, 2020               Content Version: 12.6  © 3657-0447 Hotelzilla, Incorporated. Care instructions adapted under license by [facility] ([facility]). If you have questions about a medical condition or this instruction, always ask your healthcare professional. [address] any warranty or liability for your use of this information.

## 2021-03-08 ENCOUNTER — ROUTINE PRENATAL (OUTPATIENT)
Dept: PERINATAL CARE | Age: 31
End: 2021-03-08
Payer: COMMERCIAL

## 2021-03-08 VITALS
RESPIRATION RATE: 16 BRPM | HEART RATE: 80 BPM | SYSTOLIC BLOOD PRESSURE: 90 MMHG | DIASTOLIC BLOOD PRESSURE: 50 MMHG | WEIGHT: 150 LBS | BODY MASS INDEX: 27.6 KG/M2 | TEMPERATURE: 97.1 F | HEIGHT: 62 IN

## 2021-03-08 DIAGNOSIS — O99.891 CURRENT MATERNAL CONDITION AFFECTING PREGNANCY: ICD-10-CM

## 2021-03-08 DIAGNOSIS — O99.810 ABNORMAL MATERNAL GLUCOSE TOLERANCE, ANTEPARTUM: ICD-10-CM

## 2021-03-08 DIAGNOSIS — O35.EXX0 FETAL MULTICYSTIC DYSPLASTIC KIDNEY AFFECTING CARE OF MOTHER, ANTEPARTUM, SINGLE OR UNSPECIFIED FETUS: Primary | ICD-10-CM

## 2021-03-08 DIAGNOSIS — O99.282 HYPOTHYROIDISM AFFECTING PREGNANCY IN SECOND TRIMESTER: ICD-10-CM

## 2021-03-08 DIAGNOSIS — E03.9 HYPOTHYROIDISM AFFECTING PREGNANCY IN SECOND TRIMESTER: ICD-10-CM

## 2021-03-08 DIAGNOSIS — Z3A.20 20 WEEKS GESTATION OF PREGNANCY: ICD-10-CM

## 2021-03-08 DIAGNOSIS — Z36.86 SCREENING, ANTENATAL, FOR RISK OF PRE-TERM LABOR: ICD-10-CM

## 2021-03-08 PROCEDURE — 99243 OFF/OP CNSLTJ NEW/EST LOW 30: CPT | Performed by: OBSTETRICS & GYNECOLOGY

## 2021-03-08 PROCEDURE — 76811 OB US DETAILED SNGL FETUS: CPT | Performed by: OBSTETRICS & GYNECOLOGY

## 2021-03-08 PROCEDURE — 76817 TRANSVAGINAL US OBSTETRIC: CPT | Performed by: OBSTETRICS & GYNECOLOGY

## 2021-03-10 ENCOUNTER — TELEPHONE (OUTPATIENT)
Dept: OBGYN CLINIC | Age: 31
End: 2021-03-10

## 2021-03-10 NOTE — TELEPHONE ENCOUNTER
You can always put letter in for the dental I usually do a blank letter and use the .obdent letter it should pop up when you start typing if not I can share it with you.

## 2021-03-29 ENCOUNTER — HOSPITAL ENCOUNTER (OUTPATIENT)
Age: 31
Discharge: HOME | End: 2021-03-29
Payer: COMMERCIAL

## 2021-03-29 DIAGNOSIS — E03.9 HYPOTHYROIDISM, UNSPECIFIED TYPE: ICD-10-CM

## 2021-03-29 DIAGNOSIS — R73.09 ABNORMAL GLUCOSE TOLERANCE TEST: ICD-10-CM

## 2021-03-29 DIAGNOSIS — Z3A.19 19 WEEKS GESTATION OF PREGNANCY: ICD-10-CM

## 2021-03-29 LAB
AMOUNT GLUCOSE GIVEN: ABNORMAL G
GLUCOSE FASTING: 78 MG/DL (ref 65–99)
GLUCOSE TOLERANCE TEST 1 HOUR: 138 MG/DL (ref 65–184)
GLUCOSE TOLERANCE TEST 2 HOUR: 141 MG/DL (ref 65–139)
GLUCOSE TOLERANCE TEST 3 HOUR: 129 MG/DL (ref 65–130)
THYROXINE, FREE: 1.28 NG/DL (ref 0.93–1.7)
TSH SERPL DL<=0.05 MIU/L-ACNC: 1.19 MIU/L (ref 0.3–5)

## 2021-03-29 PROCEDURE — 82952 GTT-ADDED SAMPLES: CPT

## 2021-03-29 PROCEDURE — 84443 ASSAY THYROID STIM HORMONE: CPT

## 2021-03-29 PROCEDURE — 82951 GLUCOSE TOLERANCE TEST (GTT): CPT

## 2021-03-29 PROCEDURE — 84439 ASSAY OF FREE THYROXINE: CPT

## 2021-03-29 PROCEDURE — 36415 COLL VENOUS BLD VENIPUNCTURE: CPT

## 2021-03-30 ENCOUNTER — ROUTINE PRENATAL (OUTPATIENT)
Dept: OBGYN CLINIC | Age: 31
End: 2021-03-30

## 2021-03-30 VITALS
HEART RATE: 87 BPM | DIASTOLIC BLOOD PRESSURE: 61 MMHG | SYSTOLIC BLOOD PRESSURE: 95 MMHG | WEIGHT: 153.6 LBS | BODY MASS INDEX: 28.09 KG/M2

## 2021-03-30 DIAGNOSIS — Z98.891 HISTORY OF CESAREAN SECTION: ICD-10-CM

## 2021-03-30 DIAGNOSIS — Z34.92 PRENATAL CARE IN SECOND TRIMESTER: Primary | ICD-10-CM

## 2021-03-30 DIAGNOSIS — E03.9 HYPOTHYROIDISM, UNSPECIFIED TYPE: ICD-10-CM

## 2021-03-30 DIAGNOSIS — Z3A.23 23 WEEKS GESTATION OF PREGNANCY: ICD-10-CM

## 2021-03-30 DIAGNOSIS — O09.90 HIGH RISK PREGNANCY, ANTEPARTUM: ICD-10-CM

## 2021-03-30 DIAGNOSIS — O36.92X0: ICD-10-CM

## 2021-03-30 PROCEDURE — 0502F SUBSEQUENT PRENATAL CARE: CPT | Performed by: OBSTETRICS & GYNECOLOGY

## 2021-03-30 NOTE — PATIENT INSTRUCTIONS
day.  Ease or reduce swelling in your feet, ankles, hands, and fingers  · If your fingers are puffy, take off your rings. · Do not eat high-salt foods, such as potato chips. · Prop up your feet on a stool or couch as much as possible. Sleep with pillows under your feet. · Do not stand for long periods of time or wear tight shoes. · Wear support stockings. Where can you learn more? Go to https://Isolation SciencespeFaculte.Priceline. org and sign in to your Platfora account. Enter G264 in the CerRx box to learn more about \"Weeks 22 to 26 of Your Pregnancy: Care Instructions. \"     If you do not have an account, please click on the \"Sign Up Now\" link. Current as of: October 8, 2020               Content Version: 12.8  © 0708-6696 Healthwise, Incorporated. Care instructions adapted under license by [facility] ([facility]). If you have questions about a medical condition or this instruction, always ask your healthcare professional. [place] [address] any warranty or liability for your use of this information.

## 2021-03-30 NOTE — PROGRESS NOTES
Prenatal Visit    Wing Jackson is a 31 y.o. female  at 18w3d    The patient was seen and evaluated. Reports positive fetal movements. She denies headache, vision changes, RUQ pain, contractions, vaginal bleeding and leakage of fluid. She denies any fevers/chills, SOB, cough, sore throat, myalgias, n/v, loss of taste/smell or sick contacts. The patient already received the influenza vaccine this year. The problem list reflects the active issues addressed during today's visit    Vitals:     BP: 95/61  Weight: 153 lb 9.6 oz (69.7 kg)  Pulse: 87  Patient Position: Sitting  Fetal Heart Rate: 155  Movement: Present     Assessment & Plan:  Wing Jackson is a 31 y.o. female  at 23w4d   - 28 week labs ordered, including (CBC, 1 hr GTT), the patient is to complete this in the next 4 weeks. - The NIPT was reviewed and found to be normal   - A single marker MSAFP was reviewed and found to be normal.    - The patient's anatomy ultrasound has been completed and reviewed with patient.      - Next [facility] appointment 20 for fetal echo   -  labor and kick count precautions given. - Signs and symptoms of preeclampsia reviewed. - Discussed updated COVID precautions and policies, including but not limited to outpatient testing 3-4 days prior to scheduled delivery or universal rapid screening on L&D for unscheduled delivery (unless previously positive). Reviewed limited staff and no visitors if symptomatic and COVID positive. Reviewed that one asymptomatic support person may be present if patient is COVID positive and asymptomatic. Discussed that two support people are allowed when COVID negative. Encouraged social distancing and appropriate hand washing/hygiene practices. Reviewed symptoms suspicious for COVID infection.  Discussed that ACOG, SMFM, and the CDC recommend to not withold immunization in pregnant and breastfeeding women who meet criteria for receipt of the vaccine based on the ACIP recommended priority groups. All questions answered. Patient vocalized understanding. Patient Active Problem List    Diagnosis Date Noted    h/o  x1 2016     Priority: Medium     Arrest of dilation and suspected OP presentation, LTCS   calculator 43.5%      PCOS (polycystic ovarian syndrome) 2014     Priority: Medium     Early 1h , 3h GTT ordered 21      Influenza vaccine needed 2016     Priority: Low     Given 20      Fetal cystic/echogenic kidney 2021     Following with MFM  Fetal echo ordered        Pregnancy conceived with femara 2020    Hypothyroidism      Will trend thyroid studies q 4 weeks      Abnormal uterine bleeding (AUB) 06/15/2019    Amenorrhea, unspecified 2018     Hx of same, desires conception       Return in about 4 weeks (around 2021) for Adina Len 9038. The patient was instructed on fetal kick counts and was given a kick sheet to complete every 8 hours. This is to begin at 28 weeks gestation. She was instructed that the baby should move at a minimum of ten times within one hour after a meal. The patient was instructed to lay down on her left side twenty minutes after eating and count movements for up to one hour with a target value of ten movements. She was instructed to notify the office if she did not make that target after two attempts or if after any attempt there was less than four movements.     Sharon Hester,    [facility] [place]  3/30/2021 9:31 AM

## 2021-04-06 ENCOUNTER — ROUTINE PRENATAL (OUTPATIENT)
Dept: PERINATAL CARE | Age: 31
End: 2021-04-06
Payer: COMMERCIAL

## 2021-04-06 VITALS
TEMPERATURE: 97.4 F | HEIGHT: 62 IN | SYSTOLIC BLOOD PRESSURE: 97 MMHG | HEART RATE: 76 BPM | BODY MASS INDEX: 28.34 KG/M2 | WEIGHT: 154 LBS | RESPIRATION RATE: 16 BRPM | DIASTOLIC BLOOD PRESSURE: 58 MMHG

## 2021-04-06 DIAGNOSIS — O99.282 HYPOTHYROIDISM AFFECTING PREGNANCY IN SECOND TRIMESTER: ICD-10-CM

## 2021-04-06 DIAGNOSIS — Z13.89 ENCOUNTER FOR ROUTINE SCREENING FOR MALFORMATION USING ULTRASONICS: ICD-10-CM

## 2021-04-06 DIAGNOSIS — Z36.4 ANTENATAL SCREENING FOR FETAL GROWTH RETARDATION USING ULTRASONICS: ICD-10-CM

## 2021-04-06 DIAGNOSIS — E03.9 HYPOTHYROIDISM AFFECTING PREGNANCY IN SECOND TRIMESTER: ICD-10-CM

## 2021-04-06 DIAGNOSIS — O35.9XX0 FETAL ABNORMALITY AFFECTING MANAGEMENT OF MOTHER, SINGLE OR UNSPECIFIED FETUS: ICD-10-CM

## 2021-04-06 DIAGNOSIS — O99.019 ANEMIA DURING PREGNANCY: Primary | ICD-10-CM

## 2021-04-06 DIAGNOSIS — O99.810 ABNORMAL MATERNAL GLUCOSE TOLERANCE, ANTEPARTUM: ICD-10-CM

## 2021-04-06 DIAGNOSIS — O35.EXX0 FETAL MULTICYSTIC DYSPLASTIC KIDNEY AFFECTING CARE OF MOTHER, ANTEPARTUM, SINGLE OR UNSPECIFIED FETUS: Primary | ICD-10-CM

## 2021-04-06 DIAGNOSIS — O99.891 CURRENT MATERNAL CONDITION AFFECTING PREGNANCY: ICD-10-CM

## 2021-04-06 DIAGNOSIS — Z3A.24 24 WEEKS GESTATION OF PREGNANCY: ICD-10-CM

## 2021-04-06 PROCEDURE — 76827 ECHO EXAM OF FETAL HEART: CPT | Performed by: OBSTETRICS & GYNECOLOGY

## 2021-04-06 PROCEDURE — 76816 OB US FOLLOW-UP PER FETUS: CPT | Performed by: OBSTETRICS & GYNECOLOGY

## 2021-04-06 PROCEDURE — 76825 ECHO EXAM OF FETAL HEART: CPT | Performed by: OBSTETRICS & GYNECOLOGY

## 2021-04-06 PROCEDURE — 93325 DOPPLER ECHO COLOR FLOW MAPG: CPT | Performed by: OBSTETRICS & GYNECOLOGY

## 2021-04-06 NOTE — PROGRESS NOTES
Obstetric/Gynecology Maternal Fetal Medicine Resident Note    Patient seen and scanned at [place] [address] office today. Scan re-demonstrated previous finding of cystic kidneys, which patient was previously consulted for. Today's scan also has new finding of fluid filled rectum. Finding discussed with Dr. Lillie Vargas who is requesting consult. Patient updated, consult will be scheduled.      DO TORREY Eisenberg Resident, PGY2  [facility]  4/6/2021, 3:29 PM

## 2021-04-06 NOTE — PROGRESS NOTES
Discussed invasive testing and non-invasive prenatal testing (NIPT) again today (procedure brochures provided to patient today) with patient. Patient again declined invasive prenatal diagnostic testing today (including for evaluation and testing for fetal aneuploidy, fetal microdeletions, fetal single gene disorders, fetal microarray testing, etc). Please refer to non-invasive prenatal testing/NIPT results drawn in primary OB office.

## 2021-04-07 ENCOUNTER — TELEPHONE (OUTPATIENT)
Dept: OBGYN CLINIC | Age: 31
End: 2021-04-07

## 2021-04-07 NOTE — TELEPHONE ENCOUNTER
Patient called with questions regarding her recent MFM scan. Reviewed stable cystic fetal kidney with new finding of fluid filled rectum. All questions answered to the best of my ability. Patient vocalized understanding and was appreciative.     Electronically signed by Anu Mcqueen DO on 4/7/2021 at 4:11 PM

## 2021-04-27 ENCOUNTER — ROUTINE PRENATAL (OUTPATIENT)
Dept: OBGYN CLINIC | Age: 31
End: 2021-04-27

## 2021-04-27 VITALS
WEIGHT: 160 LBS | DIASTOLIC BLOOD PRESSURE: 61 MMHG | BODY MASS INDEX: 29.26 KG/M2 | SYSTOLIC BLOOD PRESSURE: 102 MMHG | HEART RATE: 91 BPM

## 2021-04-27 DIAGNOSIS — Z3A.27 27 WEEKS GESTATION OF PREGNANCY: ICD-10-CM

## 2021-04-27 DIAGNOSIS — E03.9 HYPOTHYROIDISM, UNSPECIFIED TYPE: ICD-10-CM

## 2021-04-27 DIAGNOSIS — O09.90 HIGH RISK PREGNANCY, ANTEPARTUM: ICD-10-CM

## 2021-04-27 DIAGNOSIS — O36.92X0: ICD-10-CM

## 2021-04-27 DIAGNOSIS — Z98.891 HISTORY OF CESAREAN SECTION: ICD-10-CM

## 2021-04-27 DIAGNOSIS — Z34.93 PRENATAL CARE IN THIRD TRIMESTER: Primary | ICD-10-CM

## 2021-04-27 DIAGNOSIS — E28.2 PCOS (POLYCYSTIC OVARIAN SYNDROME): ICD-10-CM

## 2021-04-27 PROCEDURE — 0502F SUBSEQUENT PRENATAL CARE: CPT | Performed by: OBSTETRICS & GYNECOLOGY

## 2021-04-27 NOTE — PROGRESS NOTES
asymptomatic. Discussed that two support people are allowed when COVID negative. Encouraged social distancing and appropriate hand washing/hygiene practices. Reviewed symptoms suspicious for COVID infection. Discussed that ACOG, SMFM, and the CDC recommend to not withold immunization in pregnant and breastfeeding women who meet criteria for receipt of the vaccine based on the ACIP recommended priority groups. All questions answered. Patient vocalized understanding. Patient Active Problem List    Diagnosis Date Noted    h/o  x1 2016     Priority: Medium     Arrest of dilation and suspected OP presentation, LTCS   calculator 43.5%  Desires TOLAC      PCOS (polycystic ovarian syndrome) 2014     Priority: Medium     Early 1h , 3h GTT ordered 21      Influenza vaccine needed 2016     Priority: Low     Given 20      Fetal cystic/echogenic kidney 2021     Following with MFM  Fetal echo ordered        Pregnancy conceived with femara 2020    Hypothyroidism      Will trend thyroid studies q 4 weeks      Abnormal uterine bleeding (AUB) 06/15/2019    Amenorrhea, unspecified 2018     Hx of same, desires conception       Return in about 2 weeks (around 2021) for Parva Domus 9038. The patient was counseled on Labor & Delivery. Route of delivery and counseling on vaginal, operative vaginal, and  sections were completed with the risks of each to both the patient as well as her baby. The possibility of a blood transfusion was discussed as well. The patient was not opposed to receiving a transfusion if needed. The patient was counseled on types of analgesia during labor. The patient has been instructed to call the office at anytime prior to going into the hospital so the on-call physician may direct her to the appropriate facility for care.  Exceptions were reviewed including but not limited to: Decreased fetal movement, vaginal Bleeding or

## 2021-04-27 NOTE — PATIENT INSTRUCTIONS
Patient Education        Weeks 26 to 30 of Your Pregnancy: Care Instructions  Overview     You are now entering your last trimester of pregnancy. Your baby is growing quickly. Kenyatta López probably feel your baby moving around more often. Your doctor may ask you to count your baby's kicks. Your back may ache as your body gets used to your baby's size and length. If you haven't already had the Tdap shot during this pregnancy, talk to your doctor about getting it. It will help protect your  against pertussis infection. During this time, it's important to take care of yourself and pay attention to what your body needs. If you feel sexual, you can explore ways to be close with your partner that match your comfort and desire. Follow-up care is a key part of your treatment and safety. Be sure to make and go to all appointments, and call your doctor if you are having problems. It's also a good idea to know your test results and keep a list of the medicines you take. How can you care for yourself at home? Take it easy at work  · Take frequent breaks. If possible, stop working when you are tired, and rest during your lunch hour. · Take bathroom breaks every 2 hours. · Change positions often. If you sit for long periods, stand up and walk around. · When you stand for a long time, keep one foot on a low stool with your knee bent. After standing a lot, sit with your feet up. · Avoid fumes, chemicals, and tobacco smoke. Be sexual in your own way  · Having sex during pregnancy is okay, unless your doctor tells you not to. · You may be very interested in sex, or you may have no interest at all. · Your growing belly can make it hard to find a good position during intercourse. [place] and explore. · You may get cramps in your uterus when your partner touches your breasts. · A back rub may relieve the backache or cramps that sometimes follow orgasm. Learn about  labor  · Watch for signs of  labor. You may be going into labor if:  ? You have menstrual-like cramps, with or without nausea. ? You have about 6 or more contractions in 1 hour, even after you have had a glass of water and are resting. ? You have a low, dull backache that does not go away when you change your position. ? You have pain or pressure in your pelvis that comes and goes in a pattern. ? You have intestinal cramping or flu-like symptoms, with or without diarrhea.  ? You notice an increase or change in your vaginal discharge. Discharge may be heavy, mucus-like, watery, or streaked with blood. ? Your water breaks. · If you think you have  labor:  ? Drink 2 or 3 glasses of water or juice. Not drinking enough fluids can cause contractions. ? Stop what you are doing, and empty your bladder. Then lie down on your left side for at least 1 hour. ? While lying on your side, find your breast bone. Put your fingers in the soft spot just below it. Move your fingers down toward your belly button to find the top of your uterus. Check to see if it is tight. ? Contractions can be weak or strong. Record your contractions for an hour. Time a contraction from the start of one contraction to the start of the next one.  ? Single or several strong contractions without a pattern are called Cesar-Reyes contractions. They are practice contractions but not the start of labor. They often stop if you change what you are doing. ? Call your doctor if you have regular contractions. Where can you learn more? Go to https://Resolverradha.health"[employer]". org and sign in to your Living Harvest Foods account. Enter Q179 in the [facility] box to learn more about \"Weeks 26 to 30 of Your Pregnancy: Care Instructions. \"     If you do not have an account, please click on the \"Sign Up Now\" link. Current as of: 2020               Content Version: 12.8  © 5092-2202 Healthwise, Incorporated. Care instructions adapted under license by [facility] ([facility]).  If you

## 2021-05-03 ENCOUNTER — HOSPITAL ENCOUNTER (OUTPATIENT)
Age: 31
Discharge: HOME | End: 2021-05-03
Payer: COMMERCIAL

## 2021-05-03 DIAGNOSIS — Z3A.23 23 WEEKS GESTATION OF PREGNANCY: ICD-10-CM

## 2021-05-03 LAB
-: NORMAL
ABSOLUTE EOS #: 0.08 K/UL (ref 0–0.44)
ABSOLUTE IMMATURE GRANULOCYTE: 0.31 K/UL (ref 0–0.3)
ABSOLUTE LYMPH #: 1.46 K/UL (ref 1.1–3.7)
ABSOLUTE MONO #: 0.64 K/UL (ref 0.1–1.2)
BASOPHILS # BLD: 0 % (ref 0–2)
BASOPHILS ABSOLUTE: 0.03 K/UL (ref 0–0.2)
DIFFERENTIAL TYPE: ABNORMAL
EOSINOPHILS RELATIVE PERCENT: 1 % (ref 1–4)
HCT VFR BLD CALC: 35.5 % (ref 36.3–47.1)
HEMOGLOBIN: 11.7 G/DL (ref 11.9–15.1)
IMMATURE GRANULOCYTES: 4 %
LYMPHOCYTES # BLD: 18 % (ref 24–43)
MCH RBC QN AUTO: 30.4 PG (ref 25.2–33.5)
MCHC RBC AUTO-ENTMCNC: 33 G/DL (ref 28.4–34.8)
MCV RBC AUTO: 92.2 FL (ref 82.6–102.9)
MONOCYTES # BLD: 8 % (ref 3–12)
NRBC AUTOMATED: 0 PER 100 WBC
PDW BLD-RTO: 13.9 % (ref 11.8–14.4)
PLATELET # BLD: 213 K/UL (ref 138–453)
PLATELET ESTIMATE: ABNORMAL
PMV BLD AUTO: 10.5 FL (ref 8.1–13.5)
RBC # BLD: 3.85 M/UL (ref 3.95–5.11)
RBC # BLD: ABNORMAL 10*6/UL
SEG NEUTROPHILS: 69 % (ref 36–65)
SEGMENTED NEUTROPHILS ABSOLUTE COUNT: 5.64 K/UL (ref 1.5–8.1)
THYROXINE, FREE: 1.22 NG/DL (ref 0.93–1.7)
TSH SERPL DL<=0.05 MIU/L-ACNC: 0.77 MIU/L (ref 0.3–5)
WBC # BLD: 8.2 K/UL (ref 3.5–11.3)
WBC # BLD: ABNORMAL 10*3/UL

## 2021-05-03 PROCEDURE — 36415 COLL VENOUS BLD VENIPUNCTURE: CPT

## 2021-05-03 PROCEDURE — 82952 GTT-ADDED SAMPLES: CPT

## 2021-05-03 PROCEDURE — 84439 ASSAY OF FREE THYROXINE: CPT

## 2021-05-03 PROCEDURE — 85025 COMPLETE CBC W/AUTO DIFF WBC: CPT

## 2021-05-03 PROCEDURE — 84443 ASSAY THYROID STIM HORMONE: CPT

## 2021-05-03 PROCEDURE — 82951 GLUCOSE TOLERANCE TEST (GTT): CPT

## 2021-05-04 LAB
AMOUNT GLUCOSE GIVEN: NORMAL G
GLUCOSE FASTING: 83 MG/DL (ref 65–99)
GLUCOSE TOLERANCE TEST 1 HOUR: 164 MG/DL (ref 65–184)
GLUCOSE TOLERANCE TEST 2 HOUR: 123 MG/DL (ref 65–139)
GLUCOSE TOLERANCE TEST 3 HOUR: 117 MG/DL (ref 65–130)

## 2021-05-05 ENCOUNTER — ROUTINE PRENATAL (OUTPATIENT)
Dept: PERINATAL CARE | Age: 31
End: 2021-05-05
Payer: COMMERCIAL

## 2021-05-05 ENCOUNTER — HOSPITAL ENCOUNTER (OUTPATIENT)
Age: 31
Setting detail: SPECIMEN
Discharge: HOME | End: 2021-05-05
Payer: COMMERCIAL

## 2021-05-05 VITALS
RESPIRATION RATE: 16 BRPM | DIASTOLIC BLOOD PRESSURE: 47 MMHG | BODY MASS INDEX: 29.03 KG/M2 | SYSTOLIC BLOOD PRESSURE: 92 MMHG | WEIGHT: 158.73 LBS | TEMPERATURE: 98.1 F | HEART RATE: 73 BPM

## 2021-05-05 DIAGNOSIS — O35.EXX0 FETAL MULTICYSTIC DYSPLASTIC KIDNEY AFFECTING CARE OF MOTHER, ANTEPARTUM, SINGLE OR UNSPECIFIED FETUS: ICD-10-CM

## 2021-05-05 DIAGNOSIS — O99.810 ABNORMAL MATERNAL GLUCOSE TOLERANCE, ANTEPARTUM: ICD-10-CM

## 2021-05-05 DIAGNOSIS — Z3A.28 28 WEEKS GESTATION OF PREGNANCY: ICD-10-CM

## 2021-05-05 DIAGNOSIS — O99.283 HYPOTHYROIDISM AFFECTING PREGNANCY IN THIRD TRIMESTER: ICD-10-CM

## 2021-05-05 DIAGNOSIS — E03.9 HYPOTHYROIDISM AFFECTING PREGNANCY IN THIRD TRIMESTER: ICD-10-CM

## 2021-05-05 DIAGNOSIS — O99.891 CURRENT MATERNAL CONDITION AFFECTING PREGNANCY: ICD-10-CM

## 2021-05-05 DIAGNOSIS — O35.9XX0 FETAL ABNORMALITY AFFECTING MANAGEMENT OF MOTHER, SINGLE OR UNSPECIFIED FETUS: Primary | ICD-10-CM

## 2021-05-05 DIAGNOSIS — Z13.89 ENCOUNTER FOR ROUTINE SCREENING FOR MALFORMATION USING ULTRASONICS: ICD-10-CM

## 2021-05-05 LAB
ABDOMINAL CIRCUMFERENCE: NORMAL
BIPARIETAL DIAMETER: NORMAL
ESTIMATED FETAL WEIGHT: NORMAL
FEMORAL DIAMETER: NORMAL
HC/AC: NORMAL
HEAD CIRCUMFERENCE: NORMAL
TOXOPLASM IGM: 0.25 INDEX
TOXOPLASMA BLOOD FOR RATIO: <0.5 IU/ML

## 2021-05-05 PROCEDURE — 86747 PARVOVIRUS ANTIBODY: CPT

## 2021-05-05 PROCEDURE — 86644 CMV ANTIBODY: CPT

## 2021-05-05 PROCEDURE — 76805 OB US >/= 14 WKS SNGL FETUS: CPT | Performed by: OBSTETRICS & GYNECOLOGY

## 2021-05-05 PROCEDURE — 86778 TOXOPLASMA ANTIBODY IGM: CPT

## 2021-05-05 PROCEDURE — 81220 CFTR GENE COM VARIANTS: CPT

## 2021-05-05 PROCEDURE — 86777 TOXOPLASMA ANTIBODY: CPT

## 2021-05-05 PROCEDURE — 99243 OFF/OP CNSLTJ NEW/EST LOW 30: CPT | Performed by: OBSTETRICS & GYNECOLOGY

## 2021-05-05 PROCEDURE — 86645 CMV ANTIBODY IGM: CPT

## 2021-05-05 PROCEDURE — 76819 FETAL BIOPHYS PROFIL W/O NST: CPT | Performed by: OBSTETRICS & GYNECOLOGY

## 2021-05-06 LAB
CMV IGM: 0.3
CYTOMEGALOVIRUS IGG ANTIBODY: 7.9
SEND OUT REPORT: NORMAL
TEST NAME: NORMAL

## 2021-05-07 LAB
PARVOVIRUS B19 IGG ANTIBODY: 0.69 IV
PARVOVIRUS B19 IGM ANTIBODY: 0.38 IV

## 2021-05-12 ENCOUNTER — ROUTINE PRENATAL (OUTPATIENT)
Dept: OBGYN CLINIC | Age: 31
End: 2021-05-12

## 2021-05-12 VITALS
BODY MASS INDEX: 29.56 KG/M2 | WEIGHT: 161.6 LBS | DIASTOLIC BLOOD PRESSURE: 71 MMHG | SYSTOLIC BLOOD PRESSURE: 114 MMHG | HEART RATE: 87 BPM

## 2021-05-12 DIAGNOSIS — Z3A.29 29 WEEKS GESTATION OF PREGNANCY: ICD-10-CM

## 2021-05-12 DIAGNOSIS — E28.2 PCOS (POLYCYSTIC OVARIAN SYNDROME): ICD-10-CM

## 2021-05-12 DIAGNOSIS — O35.9XX0 PREGNANCY AFFECTED BY MULTIPLE CONGENITAL ANOMALIES OF FETUS, SINGLE OR UNSPECIFIED FETUS: ICD-10-CM

## 2021-05-12 DIAGNOSIS — Z98.891 HISTORY OF CESAREAN SECTION: ICD-10-CM

## 2021-05-12 DIAGNOSIS — Z34.93 PRENATAL CARE IN THIRD TRIMESTER: Primary | ICD-10-CM

## 2021-05-12 DIAGNOSIS — E03.9 HYPOTHYROIDISM, UNSPECIFIED TYPE: ICD-10-CM

## 2021-05-12 LAB — CYSTIC FIBROSIS: NORMAL

## 2021-05-12 PROCEDURE — 0502F SUBSEQUENT PRENATAL CARE: CPT | Performed by: OBSTETRICS & GYNECOLOGY

## 2021-05-12 NOTE — PATIENT INSTRUCTIONS
You may be going into labor if:  ? You have menstrual-like cramps, with or without nausea. ? You have about 6 or more contractions in 1 hour, even after you have had a glass of water and are resting. ? You have a low, dull backache that does not go away when you change your position. ? You have pain or pressure in your pelvis that comes and goes in a pattern. ? You have intestinal cramping or flu-like symptoms, with or without diarrhea.  ? You notice an increase or change in your vaginal discharge. Discharge may be heavy, mucus-like, watery, or streaked with blood. ? Your water breaks. · If you think you have  labor:  ? Drink 2 or 3 glasses of water or juice. Not drinking enough fluids can cause contractions. ? Stop what you are doing, and empty your bladder. Then lie down on your left side for at least 1 hour. ? While lying on your side, find your breast bone. Put your fingers in the soft spot just below it. Move your fingers down toward your belly button to find the top of your uterus. Check to see if it is tight. ? Contractions can be weak or strong. Record your contractions for an hour. Time a contraction from the start of one contraction to the start of the next one.  ? Single or several strong contractions without a pattern are called Valdez-Reyes contractions. They are practice contractions but not the start of labor. They often stop if you change what you are doing. ? Call your doctor if you have regular contractions. Where can you learn more? Go to https://MySmartPriceradha.healthMomentum Energy. org and sign in to your GROUNDFLOOR account. Enter W574 in the [facility] box to learn more about \"Weeks 26 to 30 of Your Pregnancy: Care Instructions. \"     If you do not have an account, please click on the \"Sign Up Now\" link. Current as of: 2020               Content Version: 12.8  © 7907-3057 Healthwise, Incorporated. Care instructions adapted under license by [facility] ([facility]).  If you have questions about a medical condition or this instruction, always ask your healthcare professional. [place] [address] any warranty or liability for your use of this information.

## 2021-05-12 NOTE — PROGRESS NOTES
not limited to outpatient testing 3-4 days prior to scheduled delivery or universal rapid screening on L&D for unscheduled delivery (unless previously positive). Reviewed limited staff and no visitors if symptomatic and COVID positive. Reviewed that one asymptomatic support person may be present if patient is COVID positive and asymptomatic. Discussed that two support people are allowed when COVID negative. Encouraged social distancing and appropriate hand washing/hygiene practices. Reviewed symptoms suspicious for COVID infection. Discussed that ACOG, SMFM, and the CDC recommend to not withold immunization in pregnant and breastfeeding women who meet criteria for receipt of the vaccine based on the ACIP recommended priority groups. All questions answered. Patient vocalized understanding. Patient Active Problem List    Diagnosis Date Noted    h/o  x1 2016     Priority: Medium     Arrest of dilation and suspected OP presentation, LTCS   calculator 43.5%  Desires TOLAC      PCOS (polycystic ovarian syndrome) 2014     Priority: Medium     Early 1h , 3h GTT ordered 21      Influenza vaccine needed 2016     Priority: Low     Given 20      Fetal cystic/echogenic kidney 2021     Following with MFM  Fetal echo ordered        Pregnancy conceived with femara 2020    Hypothyroidism      Will trend thyroid studies q 4 weeks      Abnormal uterine bleeding (AUB) 06/15/2019    Amenorrhea, unspecified 2018     Hx of same, desires conception       Return in about 2 weeks (around 2021) for Parva Domus 9038. The patient was counseled on Labor & Delivery. Route of delivery and counseling on vaginal, operative vaginal, and  sections were completed with the risks of each to both the patient as well as her baby. The possibility of a blood transfusion was discussed as well. The patient was not opposed to receiving a transfusion if needed.  The patient was counseled on types of analgesia during labor. The patient has been instructed to call the office at anytime prior to going into the hospital so the on-call physician may direct her to the appropriate facility for care. Exceptions were reviewed including but not limited to: Decreased fetal movement, vaginal Bleeding or hemorrhage, trauma, readily expectant delivery, or any instance where she feels 911 should be utilized.     Ross Perez So, DO   [facility] [place]  5/12/2021 3:09 PM

## 2021-05-26 ENCOUNTER — ROUTINE PRENATAL (OUTPATIENT)
Dept: OBGYN CLINIC | Age: 31
End: 2021-05-26
Payer: COMMERCIAL

## 2021-05-26 VITALS
BODY MASS INDEX: 29.63 KG/M2 | WEIGHT: 162 LBS | HEART RATE: 75 BPM | DIASTOLIC BLOOD PRESSURE: 65 MMHG | SYSTOLIC BLOOD PRESSURE: 108 MMHG

## 2021-05-26 DIAGNOSIS — Z3A.31 31 WEEKS GESTATION OF PREGNANCY: ICD-10-CM

## 2021-05-26 DIAGNOSIS — O35.9XX0 PREGNANCY AFFECTED BY MULTIPLE CONGENITAL ANOMALIES OF FETUS, SINGLE OR UNSPECIFIED FETUS: ICD-10-CM

## 2021-05-26 DIAGNOSIS — Z34.93 PRENATAL CARE IN THIRD TRIMESTER: Primary | ICD-10-CM

## 2021-05-26 DIAGNOSIS — Z98.891 HISTORY OF CESAREAN SECTION: ICD-10-CM

## 2021-05-26 DIAGNOSIS — E03.9 HYPOTHYROIDISM, UNSPECIFIED TYPE: ICD-10-CM

## 2021-05-26 DIAGNOSIS — Z23 NEED FOR TDAP VACCINATION: ICD-10-CM

## 2021-05-26 PROCEDURE — 90715 TDAP VACCINE 7 YRS/> IM: CPT | Performed by: OBSTETRICS & GYNECOLOGY

## 2021-05-26 PROCEDURE — 0502F SUBSEQUENT PRENATAL CARE: CPT | Performed by: OBSTETRICS & GYNECOLOGY

## 2021-05-26 PROCEDURE — 90471 IMMUNIZATION ADMIN: CPT | Performed by: OBSTETRICS & GYNECOLOGY

## 2021-05-26 NOTE — PATIENT INSTRUCTIONS
Patient Education        Weeks 30 to 28 of Your Pregnancy: Care Instructions  Overview     You've made it to the final months of your pregnancy! By now your baby is really starting to look like a baby, with hair and plump skin. As you enter the final weeks of pregnancy, the reality of having a baby may start to set in. This is a good time to set up a safe nursery and find quality  if needed. Doing this stuff ahead of time will allow you to focus on caring for and enjoying your new baby. You may also want to take a tour of your hospital's labor and delivery unit. This will help you get a better idea of what to expect while you're in the hospital.  During these last months, be sure to take good care of yourself. Pay attention to what your body needs. If your doctor says it's okay for you to work, don't push yourself too hard. If you haven't already had the Tdap shot during this pregnancy, talk to your doctor about getting it. It will help protect your  against pertussis infection. Follow-up care is a key part of your treatment and safety. Be sure to make and go to all appointments, and call your doctor if you are having problems. It's also a good idea to know your test results and keep a list of the medicines you take. How can you care for yourself at home? Pay attention to your baby's movements  · You should feel your baby move several times every day. · Your baby now turns less, and kicks and jabs more. · Your baby sleeps 20 to 45 minutes at a time and is more active at certain times of day. · If your doctor wants you to count your baby's kicks:  ? Empty your bladder, and lie on your side or relax in a comfortable chair. ? Write down your start time. ? Pay attention only to your baby's movements. Count any movement except hiccups. ? After you have counted 10 movements, write down your stop time. ? Write down how many minutes it took for your baby to move 10 times.   ? If an hour goes by and you have not recorded 10 movements, have something to eat or drink and then count for another hour. If you do not record 10 movements in either hour, call your doctor. Ease heartburn  · Eat small, frequent meals. · Do not eat chocolate, peppermint, or very spicy foods. Avoid drinks with caffeine, such as coffee, tea, and sodas. · Avoid bending over or lying down after meals. · Talk a short walk after you eat. · If heartburn is a problem at night, do not eat for 2 hours before bedtime. · Take antacids like Mylanta, Maalox, Rolaids, or Tums. Do not take antacids that have sodium bicarbonate. Care for varicose veins  · Varicose veins are blood vessels that stretch out with the extra blood during pregnancy. Your legs may ache or throb. Most varicose veins will go away after the birth. · Avoid standing for long periods of time. Sit with your legs crossed at the ankles, not the knees. · Sit with your feet propped up. · Avoid tight clothing or stockings. Wear support hose. · Exercise regularly. Try walking for at least 30 minutes a day. Where can you learn more? Go to https://EMBI.Corelytics. org and sign in to your bizHive account. Enter R529 in the [facility] box to learn more about \"Weeks 30 to 32 of Your Pregnancy: Care Instructions. \"     If you do not have an account, please click on the \"Sign Up Now\" link. Current as of: October 8, 2020               Content Version: 12.8  © 2006-2021 Healthwise, Incorporated. Care instructions adapted under license by [facility] ([facility]). If you have questions about a medical condition or this instruction, always ask your healthcare professional. [place] [address] any warranty or liability for your use of this information.

## 2021-05-26 NOTE — PROGRESS NOTES
After obtaining consent, and per orders of Dr. Waqas Mcghee, injection of Tdap given in Right deltoid by Peggy Alvarado. Patient instructed to remain in clinic for 20 minutes afterwards, and to report any adverse reaction to me immediately.

## 2021-05-26 NOTE — PROGRESS NOTES
Prenatal Visit    Camilo Amador is a 31 y.o. female  at 35w11d    The patient was seen and evaluated. Reports positive fetal movements. She denies headache, vision changes, RUQ pain, contractions, vaginal bleeding and leakage of fluid. She denies any fevers/chills, SOB, cough, sore throat, myalgias, n/v, loss of taste/smell or sick contacts. She saw [facility] on . 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 There was normal appearance of fetal colon, rectum and brain. Peds urology referral was placed and she had a virtual visit with them today. She was told that she is okay to deliver at [facility]. The plan for  antibiotics and a follow up ultrasound around 7-10 days of life. The patient was instructed on fetal kick counts and was given a kick sheet to complete every 8 hours. She was instructed that the baby should move at a minimum of ten times within one hour after a meal. The patient was instructed to lay down on her left side twenty minutes after eating and count movements for up to one hour with a target value of ten movements. She was instructed to notify the office if she did not make that target after two attempts or if after any attempt there was less than four movements. The patient reports that the targets have been made. The patient already received the influenza vaccine this year. The problem list reflects the active issues addressed during today's visit    Vitals:     BP: 108/65  Weight: 162 lb (73.5 kg)  Pulse: 75  Patient Position: Sitting  Fundal Height (cm): 31 cm  Fetal Heart Rate: 150  Movement: Present       Assessment & Plan:  Camilo Amador is a 31 y.o. female  at 35w11d   - discussed recommendations for TDAP immunization, patient requested TDAP today. - Thyroid studies ordered. Patient to complete next week.    - Next [facility] appointment 6/3   -  labor and kick count precautions given. - Signs and symptoms of preeclampsia reviewed. - Discussed updated COVID precautions and policies, including but not limited to outpatient testing 3-4 days prior to scheduled delivery or universal rapid screening on L&D for unscheduled delivery unless fully vaccinated. Reviewed limited staff and no visitors if symptomatic and COVID positive. Reviewed that one asymptomatic support person may be present if patient is COVID positive and asymptomatic. Discussed that two support people are allowed when COVID negative. Encouraged social distancing and appropriate hand washing/hygiene practices. Reviewed symptoms suspicious for COVID infection. Discussed that ACOG, SMFM, and the CDC recommend to not withold immunization in pregnant and breastfeeding women who meet criteria for receipt of the vaccine based on the ACIP recommended priority groups. All questions answered. Patient vocalized understanding. Patient Active Problem List    Diagnosis Date Noted    h/o  x1 2016     Priority: Medium     Arrest of dilation and suspected OP presentation, LTCS   calculator 43.5%  Desires TOLAC      PCOS (polycystic ovarian syndrome) 2014     Priority: Medium     Early 1h , 3h GTT ordered 21      Influenza vaccine needed 2016     Priority: Low     Given 20      Multiple fetal congenital anomalies 2021     Left cystic/echogenic kidney, fluid-filled rectum, and echogenic/dilated fetal bowel. Following with [facility]  testing recommended.   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 The patient was counseled on Labor & Delivery. Route of delivery and counseling on vaginal, operative vaginal, and  sections were completed with the risks of each to both the patient as well as her baby. The possibility of a blood transfusion was discussed as well. The patient was not opposed to receiving a transfusion if needed. The patient was counseled on types of analgesia during labor. The patient has been instructed to call the office at anytime prior to going into the hospital so the on-call physician may direct her to the appropriate facility for care. Exceptions were reviewed including but not limited to: Decreased fetal movement, vaginal Bleeding or hemorrhage, trauma, readily expectant delivery, or any instance where she feels 911 should be utilized.     Nancey Sandifer So, DO   [facility]  2021 2:59 PM

## 2021-06-03 ENCOUNTER — ROUTINE PRENATAL (OUTPATIENT)
Dept: PERINATAL CARE | Age: 31
End: 2021-06-03
Payer: COMMERCIAL

## 2021-06-03 VITALS
DIASTOLIC BLOOD PRESSURE: 61 MMHG | HEART RATE: 80 BPM | BODY MASS INDEX: 30.18 KG/M2 | TEMPERATURE: 97.3 F | WEIGHT: 164 LBS | SYSTOLIC BLOOD PRESSURE: 107 MMHG | RESPIRATION RATE: 16 BRPM | HEIGHT: 62 IN

## 2021-06-03 DIAGNOSIS — E03.9 HYPOTHYROIDISM AFFECTING PREGNANCY IN THIRD TRIMESTER: ICD-10-CM

## 2021-06-03 DIAGNOSIS — O99.810 ABNORMAL MATERNAL GLUCOSE TOLERANCE, ANTEPARTUM: ICD-10-CM

## 2021-06-03 DIAGNOSIS — O35.9XX0 FETAL ABNORMALITY AFFECTING MANAGEMENT OF MOTHER, SINGLE OR UNSPECIFIED FETUS: ICD-10-CM

## 2021-06-03 DIAGNOSIS — O99.283 HYPOTHYROIDISM AFFECTING PREGNANCY IN THIRD TRIMESTER: ICD-10-CM

## 2021-06-03 DIAGNOSIS — O35.EXX0 FETAL MULTICYSTIC DYSPLASTIC KIDNEY AFFECTING CARE OF MOTHER, ANTEPARTUM, SINGLE OR UNSPECIFIED FETUS: ICD-10-CM

## 2021-06-03 DIAGNOSIS — Z3A.32 32 WEEKS GESTATION OF PREGNANCY: ICD-10-CM

## 2021-06-03 DIAGNOSIS — O99.891 CURRENT MATERNAL CONDITION AFFECTING PREGNANCY: ICD-10-CM

## 2021-06-03 DIAGNOSIS — Z36.4 ANTENATAL SCREENING FOR FETAL GROWTH RETARDATION USING ULTRASONICS: ICD-10-CM

## 2021-06-03 DIAGNOSIS — O35.9XX0 PREGNANCY AFFECTED BY MULTIPLE CONGENITAL ANOMALIES OF FETUS, SINGLE OR UNSPECIFIED FETUS: Primary | ICD-10-CM

## 2021-06-03 LAB
ABDOMINAL CIRCUMFERENCE: NORMAL
BIPARIETAL DIAMETER: NORMAL
ESTIMATED FETAL WEIGHT: NORMAL
FEMORAL DIAMETER: NORMAL
HC/AC: NORMAL
HEAD CIRCUMFERENCE: NORMAL

## 2021-06-03 PROCEDURE — 76819 FETAL BIOPHYS PROFIL W/O NST: CPT | Performed by: OBSTETRICS & GYNECOLOGY

## 2021-06-03 PROCEDURE — 76816 OB US FOLLOW-UP PER FETUS: CPT | Performed by: OBSTETRICS & GYNECOLOGY

## 2021-06-03 NOTE — PROGRESS NOTES
Patient again declined invasive prenatal diagnostic testing today (including for evaluation and testing for fetal aneuploidy, fetal microdeletions, fetal single gene disorders, fetal microarray testing, etc). Please refer to non-invasive prenatal testing/NIPT results drawn in primary OB office (via [facility]). Please refer to maternal carrier testing results/genetic counseling for [employer] Screen test.     Please refer to all previous records (consultations, ultrasounds, etc) from [facility]. Plan of care coordinated previously with attending pediatric surgery physician (Dr. Tito Evans) from [facility] as well.

## 2021-06-09 ENCOUNTER — ROUTINE PRENATAL (OUTPATIENT)
Dept: OBGYN CLINIC | Age: 31
End: 2021-06-09
Payer: COMMERCIAL

## 2021-06-09 VITALS
BODY MASS INDEX: 30.18 KG/M2 | HEART RATE: 86 BPM | DIASTOLIC BLOOD PRESSURE: 59 MMHG | SYSTOLIC BLOOD PRESSURE: 110 MMHG | WEIGHT: 165 LBS

## 2021-06-09 DIAGNOSIS — Z34.93 PRENATAL CARE IN THIRD TRIMESTER: Primary | ICD-10-CM

## 2021-06-09 DIAGNOSIS — Z23 NEED FOR TDAP VACCINATION: ICD-10-CM

## 2021-06-09 DIAGNOSIS — O35.9XX0 PREGNANCY AFFECTED BY MULTIPLE CONGENITAL ANOMALIES OF FETUS, SINGLE OR UNSPECIFIED FETUS: ICD-10-CM

## 2021-06-09 DIAGNOSIS — O36.92X0: ICD-10-CM

## 2021-06-09 PROCEDURE — 76818 FETAL BIOPHYS PROFILE W/NST: CPT | Performed by: OBSTETRICS & GYNECOLOGY

## 2021-06-09 PROCEDURE — 0502F SUBSEQUENT PRENATAL CARE: CPT | Performed by: OBSTETRICS & GYNECOLOGY

## 2021-06-09 NOTE — PROGRESS NOTES
Latanya Sanchez is a  @ 33w5d who presents for MARY visit. She denies LOF, VB or Ctxs.  + FM. She denies any complaints. She denies any fevers/chills, SOB, cough, sore throat, HA, loss of taste/smell or sick contacts. O:  Vitals:    21 1113   BP: (!) 110/59   Pulse: 86     Gen: NAD  Abd: soft, nontender, gravid  Ext:  no edema    BPP:   10/10   2/2 tone   2/2 movement   2/2 fluid, ALONA 13.2 cm   2/2 breathing   2/2 NST: 135, mod variability, accels, no decels. Category 1 FHTs, reactive. BP: (!) 110/59  Weight: 165 lb (74.8 kg)  Pulse: 86  Patient Position: Sitting  Movement: Present    A/P:  Patient Active Problem List    Diagnosis Date Noted    h/o  x1 2016     Priority: Medium     Arrest of dilation and suspected OP presentation, LTCS   calculator 43.5%  Desires TOLAC      PCOS (polycystic ovarian syndrome) 2014     Priority: Medium     Early 1h , 3h GTT ordered 21      Influenza vaccine needed 2016     Priority: Low     Given 20      Need for Tdap vaccination 2021     Given 21      Multiple fetal congenital anomalies 2021     Left cystic/echogenic kidney, fluid-filled rectum, and echogenic/dilated fetal bowel. Following with [facility].  testing recommended.   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

## 2021-06-16 ENCOUNTER — ROUTINE PRENATAL (OUTPATIENT)
Dept: OBGYN CLINIC | Age: 31
End: 2021-06-16
Payer: COMMERCIAL

## 2021-06-16 ENCOUNTER — HOSPITAL ENCOUNTER (OUTPATIENT)
Age: 31
Setting detail: SPECIMEN
Discharge: HOME | End: 2021-06-16
Payer: COMMERCIAL

## 2021-06-16 VITALS
WEIGHT: 164.8 LBS | DIASTOLIC BLOOD PRESSURE: 62 MMHG | HEART RATE: 86 BPM | BODY MASS INDEX: 30.14 KG/M2 | SYSTOLIC BLOOD PRESSURE: 107 MMHG

## 2021-06-16 DIAGNOSIS — E03.9 HYPOTHYROIDISM, UNSPECIFIED TYPE: ICD-10-CM

## 2021-06-16 DIAGNOSIS — O35.9XX0 PREGNANCY AFFECTED BY MULTIPLE CONGENITAL ANOMALIES OF FETUS, SINGLE OR UNSPECIFIED FETUS: ICD-10-CM

## 2021-06-16 DIAGNOSIS — Z3A.31 31 WEEKS GESTATION OF PREGNANCY: ICD-10-CM

## 2021-06-16 DIAGNOSIS — Z98.891 HISTORY OF CESAREAN SECTION: ICD-10-CM

## 2021-06-16 DIAGNOSIS — Z3A.34 34 WEEKS GESTATION OF PREGNANCY: ICD-10-CM

## 2021-06-16 DIAGNOSIS — Z34.93 PRENATAL CARE IN THIRD TRIMESTER: Primary | ICD-10-CM

## 2021-06-16 LAB
THYROXINE, FREE: 1.22 NG/DL (ref 0.93–1.7)
TSH SERPL DL<=0.05 MIU/L-ACNC: 0.77 MIU/L (ref 0.3–5)

## 2021-06-16 PROCEDURE — 76818 FETAL BIOPHYS PROFILE W/NST: CPT | Performed by: OBSTETRICS & GYNECOLOGY

## 2021-06-16 NOTE — PROGRESS NOTES
Prenatal Visit    Julian Mahmood is a 31 y.o. female  at 35w7d    The patient was seen and evaluated. Reports positive fetal movements. She denies headache, vision changes, RUQ pain, contractions, vaginal bleeding and leakage of fluid. She denies any fevers/chills, SOB, cough, sore throat, myalgias, n/v, loss of taste/smell or sick contacts. The patient was instructed on fetal kick counts and was given a kick sheet to complete every 8 hours. She was instructed that the baby should move at a minimum of ten times within one hour after a meal. The patient was instructed to lay down on her left side twenty minutes after eating and count movements for up to one hour with a target value of ten movements. She was instructed to notify the office if she did not make that target after two attempts or if after any attempt there was less than four movements. The patient reports that the targets have been made. The patient already received the T-Dap Vaccine (27-36 weeks) this pregnancy. The patient already received the influenza vaccine this year. The problem list reflects the active issues addressed during today's visit    Vitals:     BP: 107/62  Weight: 164 lb 12.8 oz (74.8 kg)  Pulse: 86  Patient Position: Sitting  Albumin: Negative  Glucose: 1+  Fundal Height (cm): 34 cm  Fetal Heart Rate: rNST  Movement: Present       BPP:  10/10            2/2 tone            2/2 breathing            2/2 movement            2/2 fluid, ALONA 11.3cm            2/2 NST, 135 - moderate variability, +accels, no decels. Category I FHTs, reactive      Assessment & Plan:  Julian Mahmood is a 31 y.o. female  at 34w5d   - Encouraged compliance with thyroid studies. Patient states she will do them today. - Continue  testing as scheduled. - Next [facility] appointment    -  labor and kick count precautions given. - Signs and symptoms of preeclampsia reviewed.    - Discussed updated COVID precautions and policies, including but not limited to outpatient testing 3-4 days prior to scheduled delivery or universal rapid screening on L&D for unscheduled delivery unless fully vaccinated. Reviewed limited staff and no visitors if symptomatic and COVID positive. Reviewed that one asymptomatic support person may be present if patient is COVID positive and asymptomatic. Discussed that two support people are allowed when COVID negative. Encouraged social distancing and appropriate hand washing/hygiene practices. Reviewed symptoms suspicious for COVID infection. Discussed that ACOG, SMFM, and the CDC recommend to not withold immunization in pregnant and breastfeeding women who meet criteria for receipt of the vaccine based on the ACIP recommended priority groups. All questions answered. Patient vocalized understanding. Patient Active Problem List    Diagnosis Date Noted    h/o  x1 2016     Priority: Medium     Arrest of dilation and suspected OP presentation, LTCS   calculator 51.3%  Desires TOLAC      PCOS (polycystic ovarian syndrome) 2014     Priority: Medium     Early 1h , 3h GTT wnl      Influenza vaccine needed 2016     Priority: Low     Given 20      Need for Tdap vaccination 2021     Given 21      Multiple fetal congenital anomalies 2021     Left cystic/echogenic kidney, fluid-filled rectum, and echogenic/dilated fetal bowel. Following with [facility].  testing recommended.   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 left ureter inserting into the vagina or a left hemivagina, normal appearance of fetal colon, rectum and brain  Peds urology - okay to deliver at , plan for  antibiotics and a follow up ultrasound around 7-10 days of life      Fetal cystic/echogenic kidney 2021     Following with [facility]  Fetal echo ordered        Pregnancy conceived with femara 2020    Hypothyroidism      Will trend thyroid studies q 4 weeks      Abnormal uterine bleeding (AUB) 06/15/2019    Amenorrhea, unspecified 2018     Hx of same, desires conception       Return in about 1 week (around 2021) for NST/BPP. The patient was counseled on Labor & Delivery. Route of delivery and counseling on vaginal, operative vaginal, and  sections were completed with the risks of each to both the patient as well as her baby. The possibility of a blood transfusion was discussed as well. The patient was not opposed to receiving a transfusion if needed. The patient was counseled on types of analgesia during labor. The patient has been instructed to call the office at anytime prior to going into the hospital so the on-call physician may direct her to the appropriate facility for care. Exceptions were reviewed including but not limited to: Decreased fetal movement, vaginal Bleeding or hemorrhage, trauma, readily expectant delivery, or any instance where she feels 911 should be utilized.     Keesha Thomson So, DO   [facility] [place]  2021 2:51 PM

## 2021-06-16 NOTE — PATIENT INSTRUCTIONS
Patient Education        Weeks 34 to 39 of Your Pregnancy: Care Instructions  Overview     By now, your baby and your belly have grown quite large. It's almost time to give birth! Your baby's lungs are almost ready to breathe air. The skull bones are firm enough to protect your baby's head, but soft enough to move down through the birth canal.  You may be feeling excited and happy at times--but also anxious or scared. You might wonder how you'll know if you're in labor or what to expect during labor. Try to be open and flexible in your expectations of the birth. Because each birth is different, there's no way to know exactly what childbirth will be like for you. Talk to your doctor or midwife about any concerns you have. If you haven't already had the Tdap shot during this pregnancy, talk to your doctor about getting it. It will help protect your  against pertussis infection. In the 36th week, most women have a test for group B streptococcus (GBS). GBS is a common bacteria that can live in the vagina and rectum. It can make your baby sick after birth. If you test positive, you will get antibiotics during labor. The medicine will help keep your baby from getting the bacteria. Follow-up care is a key part of your treatment and safety. Be sure to make and go to all appointments, and call your doctor if you are having problems. It's also a good idea to know your test results and keep a list of the medicines you take. How can you care for yourself at home? Learn about pain relief choices  · Pain is different for every woman. Talk with your doctor about your feelings about pain. · You can choose from several types of pain relief. These include medicine or breathing techniques, as well as comfort measures. You can use more than one option. · If you choose to have pain medicine during labor, talk to your doctor about your options. Some medicines lower anxiety and help with some of the pain.  Others make your lower body numb so that you won't feel pain. · Be sure to tell your doctor about your pain medicine choice before you start labor or very early in your labor. You may be able to change your mind as labor progresses. · Rarely, a woman is put to sleep by medicine given through a mask or an IV. Labor and delivery  · The first stage of labor has three parts: early, active, and transition. ? Most women have early labor at home. You can stay busy or rest, eat light snacks, drink clear fluids, and start counting contractions. ? When talking during a contraction gets hard, you may be moving to active labor. During active labor, you should head for the hospital if you are not there already. ? You are in active labor when contractions come every 3 to 4 minutes and last about 60 seconds. Your cervix is opening more rapidly. ? If your water breaks, contractions will come faster and stronger. ? During transition, your cervix is stretching, and contractions are coming more rapidly. ? You may want to push, but your cervix might not be ready. Your doctor will tell you when to push. · The second stage starts when your cervix is completely opened and you are ready to push. ? Contractions are very strong to push the baby down the birth canal.  ? You will feel the urge to push. You may feel like you need to have a bowel movement. ? You may be coached to push with contractions. These contractions will be very strong, but you will not have them as often. You can get a little rest between contractions. ? You may be emotional and irritable. You may not be aware of what is going on around you.  ? One last push, and your baby is born. · The third stage is when a few more contractions push out the placenta. This may take 30 minutes or less. · The fourth stage is the welcome recovery. You may feel overwhelmed with emotions and exhausted but alert. This is a good time to start breastfeeding. Where can you learn more?   Go to [email or web address] and sign in to your [employer] account. Enter Z327 in the [place] box to learn more about \"Weeks 34 to 36 of Your Pregnancy: Care Instructions. \"     If you do not have an account, please click on the \"Sign Up Now\" link. Current as of: October 8, 2020               Content Version: 12.9  © 2006-2021 [employer]. Care instructions adapted under license by [facility] ([facility]). If you have questions about a medical condition or this instruction, always ask your healthcare professional. [place] [address] any warranty or liability for your use of this information.

## 2021-06-23 ENCOUNTER — HOSPITAL ENCOUNTER (OUTPATIENT)
Age: 31
Setting detail: SPECIMEN
Discharge: HOME | End: 2021-06-23
Payer: COMMERCIAL

## 2021-06-23 ENCOUNTER — ROUTINE PRENATAL (OUTPATIENT)
Dept: OBGYN CLINIC | Age: 31
End: 2021-06-23
Payer: COMMERCIAL

## 2021-06-23 VITALS
SYSTOLIC BLOOD PRESSURE: 101 MMHG | BODY MASS INDEX: 30.36 KG/M2 | HEART RATE: 76 BPM | DIASTOLIC BLOOD PRESSURE: 66 MMHG | WEIGHT: 166 LBS

## 2021-06-23 DIAGNOSIS — Z3A.35 35 WEEKS GESTATION OF PREGNANCY: ICD-10-CM

## 2021-06-23 DIAGNOSIS — O28.8 AFI (AMNIOTIC FLUID INDEX) BORDERLINE LOW: ICD-10-CM

## 2021-06-23 DIAGNOSIS — K21.9 GASTROESOPHAGEAL REFLUX DISEASE, UNSPECIFIED WHETHER ESOPHAGITIS PRESENT: ICD-10-CM

## 2021-06-23 DIAGNOSIS — E03.9 HYPOTHYROIDISM, UNSPECIFIED TYPE: ICD-10-CM

## 2021-06-23 DIAGNOSIS — Z34.93 PRENATAL CARE IN THIRD TRIMESTER: Primary | ICD-10-CM

## 2021-06-23 DIAGNOSIS — O35.9XX0 PREGNANCY AFFECTED BY MULTIPLE CONGENITAL ANOMALIES OF FETUS, SINGLE OR UNSPECIFIED FETUS: ICD-10-CM

## 2021-06-23 DIAGNOSIS — N89.8 VAGINAL DISCHARGE: ICD-10-CM

## 2021-06-23 PROCEDURE — 76818 FETAL BIOPHYS PROFILE W/NST: CPT | Performed by: OBSTETRICS & GYNECOLOGY

## 2021-06-23 RX ORDER — FAMOTIDINE 20 MG/1
20 TABLET, FILM COATED ORAL 2 TIMES DAILY
Qty: 60 TABLET | Refills: 3 | Status: ON HOLD | OUTPATIENT
Start: 2021-06-23

## 2021-06-23 NOTE — PATIENT INSTRUCTIONS
Patient Education        Weeks 34 to 39 of Your Pregnancy: Care Instructions  Overview     By now, your baby and your belly have grown quite large. It's almost time to give birth! Your baby's lungs are almost ready to breathe air. The skull bones are firm enough to protect your baby's head, but soft enough to move down through the birth canal.  You may be feeling excited and happy at times--but also anxious or scared. You might wonder how you'll know if you're in labor or what to expect during labor. Try to be open and flexible in your expectations of the birth. Because each birth is different, there's no way to know exactly what childbirth will be like for you. Talk to your doctor or midwife about any concerns you have. If you haven't already had the Tdap shot during this pregnancy, talk to your doctor about getting it. It will help protect your  against pertussis infection. In the 36th week, most women have a test for group B streptococcus (GBS). GBS is a common bacteria that can live in the vagina and rectum. It can make your baby sick after birth. If you test positive, you will get antibiotics during labor. The medicine will help keep your baby from getting the bacteria. Follow-up care is a key part of your treatment and safety. Be sure to make and go to all appointments, and call your doctor if you are having problems. It's also a good idea to know your test results and keep a list of the medicines you take. How can you care for yourself at home? Learn about pain relief choices  · Pain is different for every woman. Talk with your doctor about your feelings about pain. · You can choose from several types of pain relief. These include medicine or breathing techniques, as well as comfort measures. You can use more than one option. · If you choose to have pain medicine during labor, talk to your doctor about your options. Some medicines lower anxiety and help with some of the pain.  Others make your lower body numb so that you won't feel pain. · Be sure to tell your doctor about your pain medicine choice before you start labor or very early in your labor. You may be able to change your mind as labor progresses. · Rarely, a woman is put to sleep by medicine given through a mask or an IV. Labor and delivery  · The first stage of labor has three parts: early, active, and transition. ? Most women have early labor at home. You can stay busy or rest, eat light snacks, drink clear fluids, and start counting contractions. ? When talking during a contraction gets hard, you may be moving to active labor. During active labor, you should head for the hospital if you are not there already. ? You are in active labor when contractions come every 3 to 4 minutes and last about 60 seconds. Your cervix is opening more rapidly. ? If your water breaks, contractions will come faster and stronger. ? During transition, your cervix is stretching, and contractions are coming more rapidly. ? You may want to push, but your cervix might not be ready. Your doctor will tell you when to push. · The second stage starts when your cervix is completely opened and you are ready to push. ? Contractions are very strong to push the baby down the birth canal.  ? You will feel the urge to push. You may feel like you need to have a bowel movement. ? You may be coached to push with contractions. These contractions will be very strong, but you will not have them as often. You can get a little rest between contractions. ? You may be emotional and irritable. You may not be aware of what is going on around you.  ? One last push, and your baby is born. · The third stage is when a few more contractions push out the placenta. This may take 30 minutes or less. · The fourth stage is the welcome recovery. You may feel overwhelmed with emotions and exhausted but alert. This is a good time to start breastfeeding. Where can you learn more?   Go to [email or web address] and sign in to your [employer] account. Enter E192 in the [facility] box to learn more about \"Weeks 34 to 36 of Your Pregnancy: Care Instructions. \"     If you do not have an account, please click on the \"Sign Up Now\" link. Current as of: October 8, 2020               Content Version: 12.9  © 2006-2021 [employer]. Care instructions adapted under license by [facility] ([facility]). If you have questions about a medical condition or this instruction, always ask your healthcare professional. [place] [address] any warranty or liability for your use of this information.

## 2021-06-23 NOTE — PROGRESS NOTES
Prenatal Visit    Kyra Vernon is a 27 y.o. female  at 27w7d    The patient was seen and evaluated. Reports positive fetal movements. She denies headache, vision changes, RUQ pain, vaginal bleeding and leakage of fluid. She denies any fevers/chills, SOB, cough, sore throat, myalgias, n/v, loss of taste/smell or sick contacts. She reports worsening heartburn. Tums provides a little relief. She admits that she hasn't been drinking a lot of water because of her heartburn. She has noted occasional contractions and thinks her vaginal discharge is thicker than normal.    The patient was instructed on fetal kick counts and was given a kick sheet to complete every 8 hours. She was instructed that the baby should move at a minimum of ten times within one hour after a meal. The patient was instructed to lay down on her left side twenty minutes after eating and count movements for up to one hour with a target value of ten movements. She was instructed to notify the office if she did not make that target after two attempts or if after any attempt there was less than four movements. The patient reports that the targets have been made. The patient already received the T-Dap Vaccine (27-36 weeks) this pregnancy. The patient already received the influenza vaccine this year. The problem list reflects the active issues addressed during today's visit.     Allergies:  No Known Allergies    Vitals:     BP: 101/66  Weight: 166 lb (75.3 kg)  Pulse: 76  Patient Position: Sitting  Fundal Height (cm): 35 cm  Fetal Heart Rate: rNST  Movement: Present  Presentation: Transverse lie     Gen: no acute distress, appears comfortable  Heart: regular rate and rhythm, no murmurs appreciated  Lungs: clear to auscultation bilaterally  Abd: soft, gravid, nontender, nondistended, no rebound/guarding/rigidity  Ext: no edema or calf tenderness  Pelvic: No external lesions or erythema, vaginal mucosa pink and moist, no blood, thick white discharge in the vault, cervix visually closed without lesions or erythema      Labs:  Group Beta Strep collection was done. Sensitivities for clindamycin and erythromycin were not ordered. BPP:  10/10            2/2 tone            2/2 breathing            2/2 movement            2/2 fluid, ALONA 8.3cm            2/2 NST, 120 - moderate variability, +accels, no decels. Irregular contractions on toco. Category I FHTs, reactive      Assessment & Plan:  Janny Gtz is a 27 y.o. female  at 27w7d   - GBS testing was collected and sent   - continue  testing as scheduled; next [facility] appt on .    - Reviewed low-normal ALONA today. Discussed if oligohydramnios is noted at [facility] on , recommendation will be for delivery. Reviewed that option for TOLAC would depend on fetal presentation and SVE. Patient vocalized understanding.   - Encouraged po hydration.   - Recommended eating small, frequent and bland meals due to GERD in pregnancy. Rx pepcid sent to pharmacy. - Vaginitis collected and sent. -  labor and kick count precautions given. - Signs and symptoms of preeclampsia reviewed. - Discussed updated COVID precautions and policies, including but not limited to outpatient testing 3-4 days prior to scheduled delivery or universal rapid screening on L&D for unscheduled delivery unless fully vaccinated. Reviewed limited staff and no visitors if symptomatic and COVID positive. Reviewed that one asymptomatic support person may be present if patient is COVID positive and asymptomatic. Discussed that two support people are allowed when COVID negative. Encouraged social distancing and appropriate hand washing/hygiene practices. Reviewed symptoms suspicious for COVID infection.  Discussed that ACOG, SMFM, and the CDC recommend to not withold immunization in pregnant and breastfeeding women who meet criteria for receipt of the vaccine based on the ACIP recommended priority groups. All questions answered. Patient vocalized understanding. Patient Active Problem List    Diagnosis Date Noted    h/o  x1 2016     Priority: Medium     Arrest of dilation and suspected OP presentation, LTCS   calculator 51.3%  Desires TOLAC      PCOS (polycystic ovarian syndrome) 2014     Priority: Medium     Early 1h , 3h GTT wnl      Influenza vaccine needed 2016     Priority: Low     Given 20      Need for Tdap vaccination 2021     Given 21      Multiple fetal congenital anomalies 2021     Left cystic/echogenic kidney, fluid-filled rectum, and echogenic/dilated fetal bowel. Following with [facility].  testing recommended. Referred to 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, likely ectopic left ureter inserting into the vagina or a left hemivagina, normal appearance of fetal colon, rectum and brain  Peds urology - okay to deliver at , plan for  antibiotics and a follow up ultrasound around 7-10 days of life      Fetal cystic/echogenic kidney 2021     Following with [facility]  Fetal echo ordered        Pregnancy conceived with femara 2020    Hypothyroidism      Will trend thyroid studies q 4 weeks      Abnormal uterine bleeding (AUB) 06/15/2019    Amenorrhea, unspecified 2018     Hx of same, desires conception         Return in about 1 week (around 2021) for NST. The patient was counseled on the need to choose her pediatrician for her baby. Route of delivery and counseling on vaginal, operative vaginal, and  sections were completed with the risks of each to both the patient as well as her baby. The possibility of a blood transfusion was discussed as well. The patient was not opposed to receiving a transfusion if needed. The patient was counseled on types of analgesia during labor.     The patient has been instructed to call the office at anytime

## 2021-06-24 LAB
DIRECT EXAM: NORMAL
Lab: NORMAL
SPECIMEN DESCRIPTION: NORMAL

## 2021-06-26 LAB
CULTURE: NORMAL
Lab: NORMAL
SPECIMEN DESCRIPTION: NORMAL

## 2021-06-30 ENCOUNTER — ROUTINE PRENATAL (OUTPATIENT)
Dept: OBGYN CLINIC | Age: 31
End: 2021-06-30
Payer: COMMERCIAL

## 2021-06-30 VITALS — SYSTOLIC BLOOD PRESSURE: 98 MMHG | WEIGHT: 165 LBS | DIASTOLIC BLOOD PRESSURE: 65 MMHG | BODY MASS INDEX: 30.18 KG/M2

## 2021-06-30 DIAGNOSIS — Z3A.36 36 WEEKS GESTATION OF PREGNANCY: ICD-10-CM

## 2021-06-30 DIAGNOSIS — O35.9XX0 PREGNANCY AFFECTED BY MULTIPLE CONGENITAL ANOMALIES OF FETUS, SINGLE OR UNSPECIFIED FETUS: ICD-10-CM

## 2021-06-30 DIAGNOSIS — Z98.891 HISTORY OF CESAREAN SECTION: ICD-10-CM

## 2021-06-30 DIAGNOSIS — Z34.93 PRENATAL CARE IN THIRD TRIMESTER: Primary | ICD-10-CM

## 2021-06-30 DIAGNOSIS — E03.9 HYPOTHYROIDISM, UNSPECIFIED TYPE: ICD-10-CM

## 2021-06-30 PROCEDURE — 0502F SUBSEQUENT PRENATAL CARE: CPT | Performed by: OBSTETRICS & GYNECOLOGY

## 2021-06-30 PROCEDURE — 59025 FETAL NON-STRESS TEST: CPT | Performed by: OBSTETRICS & GYNECOLOGY

## 2021-06-30 NOTE — PROGRESS NOTES
Prenatal Visit    Guille Ramirez is a 27 y.o. female  at 44w9d    The patient was seen and evaluated. Reports positive fetal movements. She denies headache, vision changes, RUQ pain, contractions, vaginal bleeding and leakage of fluid. She denies any fevers/chills, SOB, cough, sore throat, myalgias, n/v, loss of taste/smell or sick contacts. The patient was instructed on fetal kick counts and was given a kick sheet to complete every 8 hours. She was instructed that the baby should move at a minimum of ten times within one hour after a meal. The patient was instructed to lay down on her left side twenty minutes after eating and count movements for up to one hour with a target value of ten movements. She was instructed to notify the office if she did not make that target after two attempts or if after any attempt there was less than four movements. The patient reports that the targets have been made. The patient already received the T-Dap Vaccine (27-36 weeks) this pregnancy. The patient already received the influenza vaccine this year. The problem list reflects the active issues addressed during today's visit. Allergies:  No Known Allergies    Vitals:     BP: 98/65  Weight: 165 lb (74.8 kg)  Patient Position: Sitting  Albumin: Negative  Glucose: Negative  Fundal Height (cm): 36 cm  Fetal Heart Rate: rNST  Movement: Present  Presentation: Vertex     REACTIVE NST  CATEGORY 1 TRACING  Moderate Variability  Baseline of 130 bpm, +accels, no decels  SEE SCANNED DOCUMENT       Assessment & Plan:  Guille Ramirez is a 27 y.o. female  at 44w9d   - GBS testing was negative. - Next [facility] appt on .    - Fetal presentation vertex today. Patient reports she still desires TOLAC. Plan for SVE at next visit. -  labor and kick count precautions given. - Signs and symptoms of preeclampsia reviewed.    - Discussed updated COVID precautions and policies, including but not limited to outpatient testing 3-4 days prior to scheduled delivery or universal rapid screening on L&D for unscheduled delivery unless fully vaccinated. Reviewed limited staff and no visitors if symptomatic and COVID positive. Reviewed that one asymptomatic support person may be present if patient is COVID positive and asymptomatic. Discussed that two support people are allowed when COVID negative. Encouraged social distancing and appropriate hand washing/hygiene practices. Reviewed symptoms suspicious for COVID infection. Discussed that ACOG, SMFM, and the CDC recommend to not withold immunization in pregnant and breastfeeding women who meet criteria for receipt of the vaccine based on the ACIP recommended priority groups. All questions answered. Patient vocalized understanding. Patient Active Problem List    Diagnosis Date Noted    h/o  x1 2016     Priority: Medium     Arrest of dilation and suspected OP presentation, LT   calculator 51.3%  Desires TOLAC      PCOS (polycystic ovarian syndrome) 2014     Priority: Medium     Early 1h , 3h GTT wnl      Influenza vaccine needed 2016     Priority: Low     Given 20      Need for Tdap vaccination 2021     Given 21      Multiple fetal congenital anomalies 2021     Left cystic/echogenic kidney, fluid-filled rectum, and echogenic/dilated fetal bowel. Following with [facility].  testing recommended.   Referred to 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, likely ectopic left ureter inserting into the vagina or a left hemivagina, normal appearance of fetal colon, rectum and brain  Peds urology - okay to deliver at , plan for  antibiotics and a follow up ultrasound around 7-10 days of life      Fetal cystic/echogenic kidney 2021     Following with [facility]  Fetal echo ordered        Pregnancy conceived with femara 2020    Hypothyroidism      Will trend thyroid studies q 4 weeks      Abnormal uterine bleeding (AUB) 06/15/2019    Amenorrhea, unspecified 2018     Hx of same, desires conception         Return in about 1 week (around 2021) for NST/BPP. The patient was counseled on the need to choose her pediatrician for her baby. Route of delivery and counseling on vaginal, operative vaginal, and  sections were completed with the risks of each to both the patient as well as her baby. The possibility of a blood transfusion was discussed as well. The patient was not opposed to receiving a transfusion if needed. The patient was counseled on types of analgesia during labor. The patient has been instructed to call the office at anytime prior to going into the hospital so the on-call physician may direct her to the appropriate facility for care. Exceptions were reviewed including but not limited to: Decreased fetal movement, vaginal Bleeding or hemorrhage, trauma, readily expectant delivery, or any instance where she feels 911 should be utilized.     DO [facility] [place]  2021 2:44 PM

## 2021-06-30 NOTE — PATIENT INSTRUCTIONS
Patient Education        Weeks 34 to 39 of Your Pregnancy: Care Instructions  Overview     By now, your baby and your belly have grown quite large. It's almost time to give birth! Your baby's lungs are almost ready to breathe air. The skull bones are firm enough to protect your baby's head, but soft enough to move down through the birth canal.  You may be feeling excited and happy at times--but also anxious or scared. You might wonder how you'll know if you're in labor or what to expect during labor. Try to be open and flexible in your expectations of the birth. Because each birth is different, there's no way to know exactly what childbirth will be like for you. Talk to your doctor or midwife about any concerns you have. If you haven't already had the Tdap shot during this pregnancy, talk to your doctor about getting it. It will help protect your  against pertussis infection. In the 36th week, most women have a test for group B streptococcus (GBS). GBS is a common bacteria that can live in the vagina and rectum. It can make your baby sick after birth. If you test positive, you will get antibiotics during labor. The medicine will help keep your baby from getting the bacteria. Follow-up care is a key part of your treatment and safety. Be sure to make and go to all appointments, and call your doctor if you are having problems. It's also a good idea to know your test results and keep a list of the medicines you take. How can you care for yourself at home? Learn about pain relief choices  · Pain is different for every woman. Talk with your doctor about your feelings about pain. · You can choose from several types of pain relief. These include medicine or breathing techniques, as well as comfort measures. You can use more than one option. · If you choose to have pain medicine during labor, talk to your doctor about your options. Some medicines lower anxiety and help with some of the pain.  Others make your lower body numb so that you won't feel pain. · Be sure to tell your doctor about your pain medicine choice before you start labor or very early in your labor. You may be able to change your mind as labor progresses. · Rarely, a woman is put to sleep by medicine given through a mask or an IV. Labor and delivery  · The first stage of labor has three parts: early, active, and transition. ? Most women have early labor at home. You can stay busy or rest, eat light snacks, drink clear fluids, and start counting contractions. ? When talking during a contraction gets hard, you may be moving to active labor. During active labor, you should head for the hospital if you are not there already. ? You are in active labor when contractions come every 3 to 4 minutes and last about 60 seconds. Your cervix is opening more rapidly. ? If your water breaks, contractions will come faster and stronger. ? During transition, your cervix is stretching, and contractions are coming more rapidly. ? You may want to push, but your cervix might not be ready. Your doctor will tell you when to push. · The second stage starts when your cervix is completely opened and you are ready to push. ? Contractions are very strong to push the baby down the birth canal.  ? You will feel the urge to push. You may feel like you need to have a bowel movement. ? You may be coached to push with contractions. These contractions will be very strong, but you will not have them as often. You can get a little rest between contractions. ? You may be emotional and irritable. You may not be aware of what is going on around you.  ? One last push, and your baby is born. · The third stage is when a few more contractions push out the placenta. This may take 30 minutes or less. · The fourth stage is the welcome recovery. You may feel overwhelmed with emotions and exhausted but alert. This is a good time to start breastfeeding. Where can you learn more?   Go to [email or web address] and sign in to your [employer] account. Enter J991 in the [place] box to learn more about \"Weeks 34 to 36 of Your Pregnancy: Care Instructions. \"     If you do not have an account, please click on the \"Sign Up Now\" link. Current as of: October 8, 2020               Content Version: 12.9  © 2006-2021 [employer]. Care instructions adapted under license by [facility] [place] ([facility]). If you have questions about a medical condition or this instruction, always ask your healthcare professional. [address] any warranty or liability for your use of this information.

## 2021-07-01 ENCOUNTER — ROUTINE PRENATAL (OUTPATIENT)
Dept: PERINATAL CARE | Age: 31
End: 2021-07-01
Payer: COMMERCIAL

## 2021-07-01 VITALS
BODY MASS INDEX: 30.36 KG/M2 | HEART RATE: 86 BPM | WEIGHT: 165 LBS | TEMPERATURE: 97.8 F | HEIGHT: 62 IN | SYSTOLIC BLOOD PRESSURE: 96 MMHG | DIASTOLIC BLOOD PRESSURE: 56 MMHG | RESPIRATION RATE: 16 BRPM

## 2021-07-01 DIAGNOSIS — E03.9 HYPOTHYROIDISM AFFECTING PREGNANCY IN THIRD TRIMESTER: ICD-10-CM

## 2021-07-01 DIAGNOSIS — Z13.89 ENCOUNTER FOR ROUTINE SCREENING FOR MALFORMATION USING ULTRASONICS: ICD-10-CM

## 2021-07-01 DIAGNOSIS — O35.EXX0 FETAL MULTICYSTIC DYSPLASTIC KIDNEY AFFECTING CARE OF MOTHER, ANTEPARTUM, SINGLE OR UNSPECIFIED FETUS: ICD-10-CM

## 2021-07-01 DIAGNOSIS — O35.9XX0 FETAL ABNORMALITY AFFECTING MANAGEMENT OF MOTHER, SINGLE OR UNSPECIFIED FETUS: Primary | ICD-10-CM

## 2021-07-01 DIAGNOSIS — O99.283 HYPOTHYROIDISM AFFECTING PREGNANCY IN THIRD TRIMESTER: ICD-10-CM

## 2021-07-01 DIAGNOSIS — O99.891 CURRENT MATERNAL CONDITION AFFECTING PREGNANCY: ICD-10-CM

## 2021-07-01 DIAGNOSIS — O35.9XX0 PREGNANCY AFFECTED BY MULTIPLE CONGENITAL ANOMALIES OF FETUS, SINGLE OR UNSPECIFIED FETUS: ICD-10-CM

## 2021-07-01 DIAGNOSIS — O99.810 ABNORMAL MATERNAL GLUCOSE TOLERANCE, ANTEPARTUM: ICD-10-CM

## 2021-07-01 DIAGNOSIS — Z3A.36 36 WEEKS GESTATION OF PREGNANCY: ICD-10-CM

## 2021-07-01 PROCEDURE — 76819 FETAL BIOPHYS PROFIL W/O NST: CPT | Performed by: OBSTETRICS & GYNECOLOGY

## 2021-07-01 PROCEDURE — 76805 OB US >/= 14 WKS SNGL FETUS: CPT | Performed by: OBSTETRICS & GYNECOLOGY

## 2021-07-07 ENCOUNTER — ROUTINE PRENATAL (OUTPATIENT)
Dept: OBGYN CLINIC | Age: 31
End: 2021-07-07
Payer: COMMERCIAL

## 2021-07-07 VITALS
HEART RATE: 89 BPM | DIASTOLIC BLOOD PRESSURE: 61 MMHG | WEIGHT: 167 LBS | BODY MASS INDEX: 30.54 KG/M2 | SYSTOLIC BLOOD PRESSURE: 96 MMHG

## 2021-07-07 DIAGNOSIS — O35.9XX0 PREGNANCY AFFECTED BY MULTIPLE CONGENITAL ANOMALIES OF FETUS, SINGLE OR UNSPECIFIED FETUS: ICD-10-CM

## 2021-07-07 DIAGNOSIS — Z98.891 HISTORY OF CESAREAN SECTION: ICD-10-CM

## 2021-07-07 DIAGNOSIS — O36.92X0: ICD-10-CM

## 2021-07-07 DIAGNOSIS — Z34.93 PRENATAL CARE IN THIRD TRIMESTER: Primary | ICD-10-CM

## 2021-07-07 DIAGNOSIS — Z3A.37 37 WEEKS GESTATION OF PREGNANCY: ICD-10-CM

## 2021-07-07 DIAGNOSIS — O09.90 HIGH RISK PREGNANCY, ANTEPARTUM: ICD-10-CM

## 2021-07-07 DIAGNOSIS — E03.9 HYPOTHYROIDISM, UNSPECIFIED TYPE: ICD-10-CM

## 2021-07-07 PROCEDURE — 76818 FETAL BIOPHYS PROFILE W/NST: CPT | Performed by: OBSTETRICS & GYNECOLOGY

## 2021-07-07 NOTE — PROGRESS NOTES
Prenatal Visit    Suad Clark is a 27 y.o. female  at 37w6d    The patient was seen and evaluated. Reports positive fetal movements. She denies headache, vision changes, RUQ pain, contractions, vaginal bleeding and leakage of fluid. She denies any fevers/chills, SOB, cough, sore throat, myalgias, n/v, loss of taste/smell or sick contacts. The patient was instructed on fetal kick counts and was given a kick sheet to complete every 8 hours. She was instructed that the baby should move at a minimum of ten times within one hour after a meal. The patient was instructed to lay down on her left side twenty minutes after eating and count movements for up to one hour with a target value of ten movements. She was instructed to notify the office if she did not make that target after two attempts or if after any attempt there was less than four movements. The patient reports that the targets have been made. The patient already received the T-Dap Vaccine (27-36 weeks) this pregnancy. The patient already received the influenza vaccine this year. The problem list reflects the active issues addressed during today's visit. Allergies:  Patient has no known allergies. Vitals:     BP: 96/61  Weight: 167 lb (75.8 kg)  Pulse: 89  Patient Position: Sitting  Fundal Height (cm): 37 cm  Fetal Heart Rate: rNST  Movement: Present  Presentation: Vertex  Dilation (cm): Closed  Effacement (%): 0  Station: -3     The patient was found to be GBS: negative    BPP:  10/10            2/2 tone            2/2 breathing            2/2 movement            2/2 fluid, ALONA 16.3cm            2/2 NST, 135 - moderate variability, +accels, no decels. Category I FHTs, reactive    Assessment & Plan:  Suad Clark is a 27 y.o. female  at 37w6d   - Continue  testing as scheduled.    - Patient counseled extensively regarding risks, benefits, and alternatives to  including approximately 0.5-0.9% risk of uterine rupture and risk of failure necessitating c/s. Advised that successful  is associated with fewer complications than elective repeat c/s, but that failed TOLAC is associated with more complications (complications include endometritis, operative injury, blood transfusion, hysterectomy, uterine rupture, maternal death). Patient advised that neonates can be divided into three groups. (1) Successful  group- have lowest risk of  mortality, NICU admission, respiratory morbidity and transient tachypnea (2) Repeat c/s group- have similiarly low rates of  mortality, NICU admission, slightly increased respiratory morbidity and transient tachypnea (3) Failed TOLAC necessitating c/s group- highest rates of HIE,  mortality, and respiratory morbidity. Patient counseled, according to [facility] calculator ([email or web address]) is 51.9%. Reviewed IOL methods and that IOL may decrease  success rate. Reviewed that SVE was unfavorable today. Patient vocalized understanding and desires repeat SVE next week prior to scheduling a repeat  section.   - Reviewed last [facility] ultrasound results and AC >97%ile. Patient counseled on risk of shoulder dystocia. Advised of risk of fetal bruising and fracture, maternal vaginal laceration, potential for permanent brachial plexus injury, and fetal hypoxia that, in rare cases, can lead to permanent neurologic injury or death. Patient verbalizes understanding.   - Labor and kick count precautions given. - Signs and symptoms of preeclampsia reviewed. - Discussed updated COVID precautions and policies, including but not limited to outpatient testing 3-4 days prior to scheduled delivery or universal rapid screening on L&D for unscheduled delivery unless fully vaccinated. Reviewed updated visitor policy. Encouraged social distancing and appropriate hand washing/hygiene practices.  Reviewed symptoms suspicious for COVID infection. Discussed that ACOG, SMFM, and the CDC recommend to not withold immunization in pregnant and breastfeeding women who meet criteria for receipt of the vaccine based on the ACIP recommended priority groups. All questions answered. Patient vocalized understanding. Patient Active Problem List    Diagnosis Date Noted    h/o  x1 2016     Priority: Medium     Arrest of dilation and suspected OP presentation, LTCS   calculator 53.3%  Desires TOLAC      PCOS (polycystic ovarian syndrome) 2014     Priority: Medium     Early 1h , 3h GTT wnl      Influenza vaccine needed 2016     Priority: Low     Given 20      Need for Tdap vaccination 2021     Given 21      Multiple fetal congenital anomalies 2021     Left cystic/echogenic kidney, fluid-filled rectum, and echogenic/dilated fetal bowel. Following with [facility].  testing recommended. 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 The patient was counseled on the need to choose her pediatrician for her baby. Route of delivery and counseling on vaginal, operative vaginal, and  sections were completed with the risks of each to both the patient as well as her baby.  The possibility of a blood transfusion was discussed as well. The patient was not opposed to receiving a transfusion if needed. The patient was counseled on types of analgesia during labor. The patient has been instructed to call the office at anytime prior to going into the hospital so the on-call physician may direct her to the appropriate facility for care. Exceptions were reviewed including but not limited to: Decreased fetal movement, vaginal Bleeding or hemorrhage, trauma, readily expectant delivery, or any instance where she feels 911 should be utilized.     Broderick Hester, DO [facility] [place]  7/7/2021 2:30 PM

## 2021-07-07 NOTE — PATIENT INSTRUCTIONS
Patient Education        Week 37 of Your Pregnancy: Care Instructions  Your Care Instructions     You are near the end of your pregnancy--and you're probably pretty uncomfortable. It may be harder to walk around. Lying down probably isn't comfortable either. You may have trouble getting to sleep or staying asleep. Most women deliver their babies between 40 and 41 weeks. This is a good time to think about packing a bag for the hospital with items you'll need. Then you'll be ready when labor starts. Follow-up care is a key part of your treatment and safety. Be sure to make and go to all appointments, and call your doctor if you are having problems. It's also a good idea to know your test results and keep a list of the medicines you take. How can you care for yourself at home? Learn about breastfeeding  · Breastfeeding is best for your baby and good for you. · Breast milk has antibodies to help your baby fight infections. · Mothers who breastfeed often lose weight faster, because making milk burns calories. · Learning the best ways to hold your baby will make breastfeeding easier. · Let your partner bathe and diaper the baby to keep your partner from feeling left out. Snuggle together when you breastfeed. · You may want to learn how to use a breast pump and store your milk. · If you choose to bottle feed, make the feeding feel like breastfeeding so you can bond with your baby. Always hold your baby and the bottle. Do not prop bottles or let your baby fall asleep with a bottle. Learn about crying  · It is common for babies to cry for 1 to 3 hours a day. Some cry more, some cry less. · Babies don't cry to make you upset or because you are a bad parent. · Crying is how your baby communicates. Your baby may be hungry; have gas; need a diaper change; or feel cold, warm, tired, lonely, or tense. Sometimes babies cry for unknown reasons.   · If you respond to your baby's needs, he or she will learn to trust you.  · Try to stay calm when your baby cries. Your baby may get more upset if he or she senses that you are upset. Know how to care for your   · Your baby's umbilical cord stump will drop off on its own, usually between 1 and 2 weeks. To care for your baby's umbilical cord area:  ? Clean the area at the bottom of the cord 2 or 3 times a day. ? Pay special attention to the area where the cord attaches to the skin. ? Keep the diaper folded below the cord. ? Use a damp washcloth or cotton ball to sponge bathe your baby until the stump has come off. · Your baby's first dark stool is called meconium. After the meconium is passed, your baby will develop his or her own bowel pattern. ? Some babies, especially  babies, have several bowel movements a day. Others have one or two a day, or one every 2 to 3 days. ?  babies often have loose, yellow stools. Formula-fed babies have more formed stools. ? If your baby's stools look like little pellets, he or she is constipated. After 2 days of constipation, call your baby's doctor. · If your baby will be circumcised, you can care for him at home. ? Gently rinse his penis with warm water after every diaper change. Do not try to remove the film that forms on the penis. This film will go away on its own. Pat dry. ? Put petroleum ointment, such as Vaseline, on the area of the diaper that will touch your baby's penis. This will keep the diaper from sticking to your baby. ? Ask the doctor about giving your baby acetaminophen (Tylenol) for pain. Where can you learn more? Go to https://chnohemieb.healthEyeScribes. org and sign in to your [employer] account. Enter 68 [id number] in the [facility] box to learn more about \"Week 37 of Your Pregnancy: Care Instructions. \"     If you do not have an account, please click on the \"Sign Up Now\" link. Current as of: 2020               Content Version: 12.9  © 2282-4389 Healthwise, [employer]. Care instructions adapted under license by [facility] ([facility]). If you have questions about a medical condition or this instruction, always ask your healthcare professional. [address] any warranty or liability for your use of this information.

## 2021-07-13 RX ORDER — LEVOTHYROXINE SODIUM 0.07 MG/1
75 TABLET ORAL DAILY
Qty: 30 TABLET | Refills: 0 | Status: ON HOLD | OUTPATIENT
Start: 2021-07-13 | End: 2021-07-22 | Stop reason: HOSPADM

## 2021-07-14 ENCOUNTER — HOSPITAL ENCOUNTER (OUTPATIENT)
Age: 31
Setting detail: SPECIMEN
Discharge: HOME | End: 2021-07-14
Payer: COMMERCIAL

## 2021-07-14 ENCOUNTER — ROUTINE PRENATAL (OUTPATIENT)
Dept: OBGYN CLINIC | Age: 31
End: 2021-07-14
Payer: COMMERCIAL

## 2021-07-14 VITALS
BODY MASS INDEX: 30.36 KG/M2 | HEART RATE: 94 BPM | SYSTOLIC BLOOD PRESSURE: 102 MMHG | WEIGHT: 166 LBS | DIASTOLIC BLOOD PRESSURE: 84 MMHG

## 2021-07-14 DIAGNOSIS — O35.9XX0 PREGNANCY AFFECTED BY MULTIPLE CONGENITAL ANOMALIES OF FETUS, SINGLE OR UNSPECIFIED FETUS: ICD-10-CM

## 2021-07-14 DIAGNOSIS — E03.9 HYPOTHYROIDISM, UNSPECIFIED TYPE: ICD-10-CM

## 2021-07-14 DIAGNOSIS — Z3A.38 38 WEEKS GESTATION OF PREGNANCY: ICD-10-CM

## 2021-07-14 DIAGNOSIS — O09.90 HIGH RISK PREGNANCY, ANTEPARTUM: ICD-10-CM

## 2021-07-14 DIAGNOSIS — Z34.93 PRENATAL CARE IN THIRD TRIMESTER: Primary | ICD-10-CM

## 2021-07-14 DIAGNOSIS — Z98.891 HISTORY OF CESAREAN SECTION: ICD-10-CM

## 2021-07-14 LAB
THYROXINE, FREE: 1.14 NG/DL (ref 0.93–1.7)
TSH SERPL DL<=0.05 MIU/L-ACNC: 1.25 MIU/L (ref 0.3–5)

## 2021-07-14 PROCEDURE — 76818 FETAL BIOPHYS PROFILE W/NST: CPT | Performed by: OBSTETRICS & GYNECOLOGY

## 2021-07-14 NOTE — PROGRESS NOTES
Prenatal Visit    Grecia Oliver is a 27 y.o. female  at 44w7d    The patient was seen and evaluated. Reports positive fetal movements. She denies headache, vision changes, RUQ pain, contractions, vaginal bleeding and leakage of fluid. She denies any fevers/chills, SOB, cough, sore throat, myalgias, n/v, loss of taste/smell or sick contacts. The patient was instructed on fetal kick counts and was given a kick sheet to complete every 8 hours. She was instructed that the baby should move at a minimum of ten times within one hour after a meal. The patient was instructed to lay down on her left side twenty minutes after eating and count movements for up to one hour with a target value of ten movements. She was instructed to notify the office if she did not make that target after two attempts or if after any attempt there was less than four movements. The patient reports that the targets have been made. The patient already received the T-Dap Vaccine (27-36 weeks) this pregnancy. The patient already received the influenza vaccine this year. The problem list reflects the active issues addressed during today's visit. Allergies:  Patient has no known allergies. Vitals:     BP: 102/84  Weight: 166 lb (75.3 kg)  Pulse: 94  Patient Position: Sitting  Fundal Height (cm): 38 cm  Fetal Heart Rate: rNST  Movement: Present  Presentation: Vertex  Dilation (cm): Closed  Effacement (%): 0  Station: -3     The patient was found to be GBS: negative    BPP:  10/10            2/2 tone            2/2 breathing            2/2 movement            2/2 fluid, ALONA 11.0cm            2/2 NST, 135 - moderate variability, +accels, no decels. Category I FHTs, reactive      Assessment & Plan:  Grecia Oliver is a 27 y.o. female  at 44w7d   - Labor and kick count precautions given. - Signs and symptoms of preeclampsia reviewed. - Reviewed with patient that SVE is not favorable for IOL/TOLAC. Recommendation made for repeat  delivery. Risks, benefits and alternatives of  section discussed, including but not limited to bleeding, scarring, infection, injury to surrounding tissues (bowel, bladder, nerves, vessels, infant, etc.), need for more surgery (e.g. Hysterectomy), need for blood or blood products, maternal or fetal death, post-op clots of lung of legs, and pneumonia. Patient vocalized understanding. Will schedule on  at [facility].   - Discussed updated COVID precautions and policies, including but not limited to outpatient testing 3-4 days prior to scheduled delivery or universal rapid screening on L&D for unscheduled delivery unless fully vaccinated. Reviewed updated visitor policy. Encouraged social distancing and appropriate hand washing/hygiene practices. Reviewed symptoms suspicious for COVID infection. Discussed that ACOG, SMFM, and the CDC recommend to not withold immunization in pregnant and breastfeeding women who meet criteria for receipt of the vaccine based on the ACIP recommended priority groups. All questions answered. Patient vocalized understanding. Patient Active Problem List    Diagnosis Date Noted    h/o  x1 2016     Priority: Medium     Arrest of dilation and suspected OP presentation, [facility]   calculator 51.9%  Desires TOLAC      PCOS (polycystic ovarian syndrome) 2014     Priority: Medium     Early 1h , 3h GTT wnl      Influenza vaccine needed 2016     Priority: Low     Given 20      Need for Tdap vaccination 2021     Given 21      Multiple fetal congenital anomalies 2021     Left cystic/echogenic kidney, fluid-filled rectum, and echogenic/dilated fetal bowel. Following with M.  testing recommended.   Referred to [facility] - appt , fetal MRI with duplicated left renal collecting system, likely ectopic left ureter inserting into the vagina or a left hemivagina, normal appearance of fetal colon, rectum and brain  Peds urology - okay to deliver at , plan for  antibiotics and a follow up ultrasound around 7-10 days of life      Fetal cystic/echogenic kidney 2021     Following with [facility]  Fetal echo ordered        Pregnancy conceived with femara 2020    Hypothyroidism      Will trend thyroid studies q 4 weeks      Abnormal uterine bleeding (AUB) 06/15/2019    Amenorrhea, unspecified 2018     Hx of same, desires conception       Return in about 2 weeks (around 2021) for postpartum. The patient was counseled on the need to choose her pediatrician for her baby. Route of delivery and counseling on vaginal, operative vaginal, and  sections were completed with the risks of each to both the patient as well as her baby. The possibility of a blood transfusion was discussed as well. The patient was not opposed to receiving a transfusion if needed. The patient was counseled on types of analgesia during labor. The patient has been instructed to call the office at anytime prior to going into the hospital so the on-call physician may direct her to the appropriate facility for care. Exceptions were reviewed including but not limited to: Decreased fetal movement, vaginal Bleeding or hemorrhage, trauma, readily expectant delivery, or any instance where she feels 911 should be utilized.     Joe Hester, DO   [facility] [place]  2021 2:28 PM

## 2021-07-14 NOTE — PATIENT INSTRUCTIONS
Patient Education        Week 45 of Your Pregnancy: Care Instructions  Your Care Instructions     Believe it or not, your baby is almost here. You may have ideas about your baby's personality because of how much he or she moves. Or you may have noticed how he or she responds to sounds, warmth, cold, and light. You may even know what kind of music your baby likes. By now, you have a better idea of what to expect during delivery. You may have talked about your birth preferences with your doctor. But even if you want a vaginal birth, it is a good idea to learn about  births.  birth means that your baby is born through a cut (incision) in your lower belly. It is sometimes the best choice for the health of the baby and the mother. Follow-up care is a key part of your treatment and safety. Be sure to make and go to all appointments, and call your doctor if you are having problems. It's also a good idea to know your test results and keep a list of the medicines you take. How can you care for yourself at home? Learn about  birth  · Most C-sections are unplanned. They are done because of problems that occur during labor. These problems might include:  ? Labor that slows or stops. ? High blood pressure or other problems for the mother. ? Signs of distress in the baby. These signs may include a very fast or slow heart rate. · Although most mothers and babies do well after , it is major surgery. It has more risks than a vaginal delivery. · In some cases, a planned  may be safer than a vaginal delivery. This may be the case if:  ? The mother has a health problem, such as a heart condition. ? The baby isn't in a head-down position for delivery. This is called a breech position. ? The uterus has scars from past surgeries. This could increase the chance of a tear in the uterus. ? There is a problem with the placenta. ?  The mother has an infection, such as genital herpes, that could be spread to the baby. ? The mother is having twins or more. ? The baby weighs 9 to 10 pounds or more. · Because of the risks of , planned C-sections generally should be done only for medical reasons. And a planned  should be done at 39 weeks or later unless there is a medical reason to do it sooner. Know what to expect after delivery, and plan for the first few weeks at home  · You, your baby, and your partner or  will get identification bands. Only people with matching bands can  the baby from the nursery. · You will learn how to feed, diaper, and bathe your baby. And you will learn how to care for the umbilical cord stump. If your baby will be circumcised, you will also learn how to care for that. · Ask people to wait to visit you until you are at home. And ask them to wash their hands before they touch your baby. · Make sure you have another adult in your home for at least 2 or 3 days after the birth. · During the first 2 weeks, limit when friends and family can visit. · Do not allow visitors who have colds or infections. Make sure all visitors are up to date with their vaccinations. Never let anyone smoke around your baby. · Try to nap when the baby naps. Be aware of postpartum depression  · \"Baby blues\" are common for the first 1 to 2 weeks after birth. You may cry or feel sad or irritable for no reason. · For some women, these feelings last longer and are more intense. This is called postpartum depression. · If your symptoms last for more than a few weeks or you feel very depressed, ask your doctor for help. · Postpartum depression can be treated. Support groups and counseling can help. Sometimes medicine can also help. Where can you learn more? Go to https://mariana.[employer]. org and sign in to your [employer] account. Enter B044 in the [employer] box to learn more about \"Week 38 of Your Pregnancy: Care Instructions. \"     If you do not have an account, please click on the \"Sign Up Now\" link. Current as of: October 8, 2020               Content Version: 12.9  © 2006-2021 Healthwise, Incorporated. Care instructions adapted under license by [facility] ([facility]). If you have questions about a medical condition or this instruction, always ask your healthcare professional. [address] any warranty or liability for your use of this information.

## 2021-07-19 NOTE — H&P
OBSTETRICAL HISTORY [facility]    Date: 2021       Time: 6:56 AM   Patient Name: Ami Goode     Patient : 1990  Room/Bed: 2771/9902-20    Admission Date/Time: 2021  5:56 AM      CC: Scheduled repeat CS     HPI: Ami Goode is a 27 y.o.  at 39w4d who presents for a scheduled repeat CS. The patient reports fetal movement is present, endorses some irregular contractions, denies loss of fluid, denies vaginal bleeding. Patient denies any headache, visual changes, difficulty breathing, RUQ pain, N/V, F/C, and pain/swelling in lower extremities. Denies any dysuria or vaginal discharge. Cervix was checked on admission and she was found to be fingertip, posterior and firm. Patient wanted to discuss with her partner about going home and returning when she is in spontaneous labor vs proceeding w/ repeat . Lengthy discussion held with patient and RN present about IOL vs spontaneous labor/TOLAC vs scheduled repeat . She ultimately decided to proceed w/ repeat . DATING:  LMP: Patient's last menstrual period was 10/16/2020.   Estimated Date of Delivery: 21   Based on: LMP    PREGNANCY RISK FACTORS:  Patient Active Problem List   Diagnosis    PCOS (polycystic ovarian syndrome)    Influenza vaccine needed    h/o  x1    Hypothyroidism    Pregnancy conceived with femara    Fetal cystic/echogenic kidney    Multiple fetal congenital anomalies    Need for Tdap vaccination    High-risk pregnancy in third trimester      Steroids Given In This Pregnancy:  no     REVIEW OF SYSTEMS:   Constitutional: negative fever, negative chills, negative weight changes   HEENT: negative visual disturbances, negative headaches, negative dizziness, negative hearing loss  Breast: Negative breast abnormalities, negative breast lumps, negative nipple discharge  Respiratory: negative dyspnea, negative cough, negative SOB  Cardiovascular: negative chest pain,  negative palpitations, negative arrhythmia, negative syncope   Gastrointestinal: negative abdominal pain, negative RUQ pain, negative N/V, negative diarrhea, negative constipation, negative bowel changes, negative heartburn   Genitourinary: negative dysuria, negative hematuria, negative urinary incontinence, negative vaginal discharge, negative vaginal bleeding or spotting  Dermatological: negative rash, negative pruritis, negative mole or other skin changes  Hematologic: negative bruising  Immunologic/Lymphatic: negative recent illness, negative recent sick contact  Musculoskeletal: negative back pain, negative myalgias, negative arthralgias  Neurological:  negative dizziness, negative migraines, negative seizures, negative weakness  Behavior/Psych: negative depression, negative anxiety, negative SI, negative HI    OBSTETRICAL HISTORY:   OB History    Para Term  AB Living   2 1 1 0 0 1   SAB TAB Ectopic Molar Multiple Live Births   0 0 0 0 0 1      # Outcome Date GA Lbr Owen/2nd Weight Sex Delivery Anes PTL Lv   2 Current            1 Term 16 41w1d  8 lb 10.6 oz (3.93 kg) F CS-LTranv   DEIDRE      Complications: Failure to Progress in First Stage      Apgar1: 8  Apgar5: 9       PAST MEDICAL HISTORY:   has a past medical history of Hypothyroidism and PCOS (polycystic ovarian syndrome). PAST SURGICAL HISTORY:   has a past surgical history that includes  section (2016). ALLERGIES:  has No Known Allergies. MEDICATIONS:  Prior to Admission medications    Medication Sig Start Date End Date Taking?  Authorizing Provider   levothyroxine (SYNTHROID) 75 MCG tablet Take 1 tablet by mouth daily 21   Cosme So, DO   famotidine (PEPCID) 20 MG tablet Take 1 tablet by mouth 2 times daily  Patient not taking: Reported on 2021   Cosme So, DO   Prenatal Multivit-Min-Fe-FA (PRENATAL VITAMINS PO) Take 1 tablet by mouth daily Historical Provider, MD       FAMILY HISTORY:  family history includes Diabetes in her maternal grandmother. SOCIAL HISTORY:   reports that she has never smoked. She has never used smokeless tobacco. She reports that she does not drink alcohol and does not use drugs.     VITALS:  Vitals:    07/20/21 0715 07/20/21 0730   BP:  99/63   Pulse:  80   Resp:  18   Temp:  98.1 °F (36.7 °C)   TempSrc:  Oral   SpO2:  98%   Weight: 166 lb (75.3 kg)    Height: 5' 2\" (1.575 m)            PHYSICAL EXAM:  Fetal Heart Monitor:  Baseline Heart Rate 120, moderate variability, present accelerations, absent decelerations  [place]: contractions, irregular, every 2-7 minutes    General appearance:  no apparent distress, alert and cooperative  HEENT: head atraumatic, normocephalic, moist mucous membranes, trachea midline  Neurologic:  alert, oriented, normal speech, no focal findings or movement disorder noted  Lungs:  No increased work of breathing, good air exchange, clear to auscultation bilaterally, no crackles or wheezing  Heart:  regular rate and rhythm and no murmur, rubs, gallops  Abdomen:  soft, gravid, non-tender, no rebound, guarding, or rigidity, no RUQ or epigastric tenderness, no signs or symptoms of abruption, no signs or symptoms of chorioamnionitis  Extremities:  no calf tenderness, non edematous, no varicosities, full range of motion in all four extremities  Musculoskeletal: Gross strength equal and intact throughout, no gross abnormalities, range of motion normal in hips, knees, shoulders and spine  Psychiatric: Mood appropriate, normal affect   Rectal Exam: not indicated  Pelvic Exam:   Chaperone for Intimate Exam: Chaperone was present for entire exam, Chaperone Name: Renee Contreras RN  Sterile Vaginal Exam:  Cervix: No cervical motion tenderness   Uterus: Is gravid, Normal size, shape, consistency and non-tender   Adnexa: Non-tender, no palpable masses  Cervix: fingertip dilated, 0 % effaced, -3 station, posterior position (out of 3 station), firm consistency, FETAL POSITION: Cephalic (confirmed by ultrasound), Membranes intact    LIMITED BEDSIDE US:  Position: Cephalic  Placental Location: anterior  Fetal Heart Tones: Present  Fetal Movement: Present  Amniotic Fluid Index/Volume: adequate 2x2 cm fluid pocket  Estimated Fetal Weight:  9 lbs 0 oz    PRENATAL LAB RESULTS:   Blood Type/Rh: O pos  Antibody Screen: negative  Hemoglobin, Hematocrit, Platelets: Hgb 63.8/IZF 36.4/Plt 250  Rubella: immune  T. Pallidum, IgG: non-reactive  Hepatitis B Surface Antigen: non-reactive   Hepatitis C Antibody: not done   HIV: non-reactive   Sickle Cell Screen: not done  Gonorrhea: negative  Chlamydia: negative  Urine culture: negative, date: 2020    Early 1 hour Glucose Tolerance Test: 143  Early 3 hour Glucose Tolerance Test: Fastin; 1 hour: 138; 2 hour  141; 3 hour: 129  3 hour Glucose Tolerance Test: Fastin; 1 hour: 164; 2 hour  123; 3 hour: 117    Group B Strep: negative RV culture on 2021  Cystic Fibrosis Screen: negative  First Trimester Screen: not available  MSAFP/Multiple Markers: low risk for aneuploidy  Non-Invasive Prenatal Testing: low risk for aneuploidy  Anatomy US: anterior placenta, 3VC with normal cord insertion, female gender, abnormal anatomy (see below)    ASSESSMENT & PLAN:  Geovanna Hall is a 27 y.o. female  at 39w4d IUP   - GBS negative / Rh positive / R immune   - No indication for GBS prophylaxis   - COVID pending    Scheduled Repeat CSection  - Admit to labor and delivery under the service of Dr. Hester    - VSS    - cEFM and TOCO   - Cat I FHT and TOCO showing irregular contractions   - CBC, T&S, T.Pal, COVID ordered   - UDS ordered.  R/B/A discussed with patient and patient agreeable   - IVF: LR 125mL/hr   - C/S consent in chart    - AncefTigre preoperatively   - Continue expectant management     Hx CS x1   -     -  calculator 52%   - Declines TOLAC    Multiple Fetal Anomalies    - Has been following with M   - Dilated loops of bowel, duplicated left renal collecting system with dysplastic appearing upper pole, fluid filled cystis structure noted between bladder and recum   - Has seen peds surgery from [facility] (Dr Nowak ) and baby safe to deliver @ V's. Will need Abx and repeat US after delivery    - Positive CMV IgG titers    - Negative CMV IgM titers, Parvovirus IgM and IgG, Toxo IgG and IgM   - Negative carrier screening    - NIPT wnl     Hypothyroidism   - On Synthroid 75mg QD   - Free T4 1.14, TSH 1.25 on 21    Pregnancy conceived with femara    Elevated 1hr GTT    - 3 hr GTT wnl     BMI 30      Patient Active Problem List    Diagnosis Date Noted    h/o  x1 2016     Priority: Medium     Arrest of dilation and suspected OP presentation, LTCS   calculator 51.9%  Desires TOLAC      PCOS (polycystic ovarian syndrome) 2014     Priority: Medium     Early 1h , 3h GTT wnl      Influenza vaccine needed 2016     Priority: Low     Given 20      High-risk pregnancy in third trimester 2021    Need for Tdap vaccination 2021     Given 21      Multiple fetal congenital anomalies 2021     Left cystic/echogenic kidney, fluid-filled rectum, and echogenic/dilated fetal bowel. Following with [facility].  testing recommended. Referred to [facility] [place] - appt , fetal MRI with duplicated left renal collecting system, likely ectopic left ureter inserting into the vagina or a left hemivagina, normal appearance of fetal colon, rectum and brain  Peds urology - okay to deliver at , plan for  antibiotics and a follow up ultrasound around 7-10 days of life      Fetal cystic/echogenic kidney 2021     Following with [facility]  Fetal echo ordered        Pregnancy conceived with femara 2020    Hypothyroidism      Will trend thyroid studies q 4 weeks         Plan discussed with Dr. Vivien Roche, who is agreeable. Steroids given this admission: No    Risks, benefits, alternatives and possible complications have been discussed in detail with the patient. Admission, and post admission procedures and expectations were discussed in detail. All questions were answered.     Attending's Name: Dr. Michael June  Ob/Gyn Resident  7/20/2021, 6:56 AM       Senior Residents Attestation Statement  I was present with the resident physician during the history and exam. I discussed the findings and plans with the resident physician and agree as documented in her note     Maria Elena Osorio DO   OB/GYN Resident  Pager [phone] [facility], [facility]  7/20/2021, 6:59 AM

## 2021-07-19 NOTE — DISCHARGE SUMMARY
Obstetric Discharge Summary  [facility]    Patient Name: Ondina Art  Patient : 1990  Primary Care Physician: Mikaela Randolph PA-C  Admit Date: 2021    Principal Diagnosis: IUP at 39w4d, admitted for scheduled repeat       Her pregnancy has been complicated by:   Patient Active Problem List   Diagnosis    PCOS (polycystic ovarian syndrome)    Influenza vaccine needed    h/o  x1    Hypothyroidism    Pregnancy conceived with femara    Fetal cystic/echogenic kidney    Multiple fetal congenital anomalies    Need for Tdap vaccination    High-risk pregnancy in third trimester    39 weeks gestation of pregnancy    RLTCS 21 F Apg 8/9 Wt 8#1    Other immediate postpartum hemorrhage       Infection Present?: No  Hospital Acquired: No    Surgical Operations & Procedures:  Analgesia: spinal  Delivery Type:  Delivery: See Labor and Delivery Summary   Laceration(s): N/A    Consultations: NICU and Anesthesia    Pertinent Findings & Procedures:   Ondina Art is a 27 y.o. female  at 43w3d admitted for scheduled repeat c-setion; received ancef preoperatively . She delivered by repeat low transverse  a Live Born infant on 21. Information for the patient's :  Christiano Pabon [[id number]]   female   Birth Weight: 8 lb 1.3 oz (3.665 kg)       Apgars: 8 at 1 minute and 9 at 5 minutes. Postpartum course:   POD#1 Hgb was 9.8. Iron supplementation was started. Synthroid was decreased to prepregnancy dosing (75mcg to 50mcg). Course of patient: complicated by postpartum hemorrhage (1050mL blood loss intraoperatively).     Discharge to: Home    Readmission planned: no     Recommendations on Discharge:     Medications:      Medication List      START taking these medications    docusate sodium 100 MG capsule  Commonly known as: COLACE  Take 1 capsule by mouth 2 times daily     ferrous sulfate 325 (65 Fe) MG EC tablet  Commonly known as: Fe Tabs  Take 1 tablet by mouth 2 times daily     HYDROcodone-acetaminophen 5-325 MG per tablet  Commonly known as: NORCO  Take 1 tablet by mouth every 6 hours as needed for Pain for up to 5 days. ibuprofen 800 MG tablet  Commonly known as: ADVIL;MOTRIN  Take 1 tablet by mouth every 8 hours as needed for Pain        CHANGE how you take these medications    levothyroxine 50 MCG tablet  Commonly known as: SYNTHROID  Take 1 tablet by mouth daily  What changed:   · medication strength  · how much to take        ASK your doctor about these medications    famotidine 20 MG tablet  Commonly known as: PEPCID  Take 1 tablet by mouth 2 times daily     PRENATAL VITAMINS PO           Where to Get Your Medications      You can get these medications from any pharmacy    Bring a paper prescription for each of these medications  · docusate sodium 100 MG capsule  · ferrous sulfate 325 (65 Fe) MG EC tablet  · HYDROcodone-acetaminophen 5-325 MG per tablet  · ibuprofen 800 MG tablet  · levothyroxine 50 MCG tablet           Activity: pelvic rest x 6 weeks, no driving on narcotics, no lifting greater than 15 lbs  Diet: regular diet  Follow up: 1 week from delivery for silver dressing removal    Condition on discharge: stable    Discharge date: 7/22/21    Johnathon Mireles DO  Ob/Gyn Resident    Comments:  Home care and follow-up care were reviewed. Pelvic rest, and birth control were reviewed. Signs and symptoms of mastitis and post partum depression were reviewed. The patient is to notify her physician if any of these occur. The patient was counseled on secondary smoke risks and the increased risk of sudden infant death syndrome and respiratory problems to her baby with exposure. She was counseled on various alternate recommendations to decrease the exposure to secondary smoke to her children.

## 2021-07-20 ENCOUNTER — ANESTHESIA (OUTPATIENT)
Dept: LABOR AND DELIVERY | Age: 31
End: 2021-07-20
Payer: COMMERCIAL

## 2021-07-20 ENCOUNTER — ANESTHESIA EVENT (OUTPATIENT)
Dept: LABOR AND DELIVERY | Age: 31
End: 2021-07-20
Payer: COMMERCIAL

## 2021-07-20 ENCOUNTER — HOSPITAL ENCOUNTER (INPATIENT)
Age: 31
LOS: 2 days | Discharge: HOME | End: 2021-07-22
Attending: OBSTETRICS & GYNECOLOGY | Admitting: OBSTETRICS & GYNECOLOGY
Payer: COMMERCIAL

## 2021-07-20 VITALS — SYSTOLIC BLOOD PRESSURE: 105 MMHG | TEMPERATURE: 94.8 F | DIASTOLIC BLOOD PRESSURE: 65 MMHG | OXYGEN SATURATION: 100 %

## 2021-07-20 PROBLEM — O09.93 HIGH-RISK PREGNANCY IN THIRD TRIMESTER: Status: ACTIVE | Noted: 2021-07-20

## 2021-07-20 LAB
ABO/RH: NORMAL
AMPHETAMINE SCREEN URINE: NEGATIVE
ANTIBODY SCREEN: NEGATIVE
ARM BAND NUMBER: NORMAL
BARBITURATE SCREEN URINE: NEGATIVE
BENZODIAZEPINE SCREEN, URINE: NEGATIVE
BUPRENORPHINE URINE: NORMAL
CANNABINOID SCREEN URINE: NEGATIVE
COCAINE METABOLITE, URINE: NEGATIVE
EXPIRATION DATE: NORMAL
HCT VFR BLD CALC: 39.5 % (ref 36.3–47.1)
HEMOGLOBIN: 12.7 G/DL (ref 11.9–15.1)
MCH RBC QN AUTO: 29.3 PG (ref 25.2–33.5)
MCHC RBC AUTO-ENTMCNC: 32.2 G/DL (ref 28.4–34.8)
MCV RBC AUTO: 91.2 FL (ref 82.6–102.9)
MDMA URINE: NORMAL
METHADONE SCREEN, URINE: NEGATIVE
METHAMPHETAMINE, URINE: NORMAL
NRBC AUTOMATED: 0 PER 100 WBC
OPIATES, URINE: NEGATIVE
OXYCODONE SCREEN URINE: NEGATIVE
PDW BLD-RTO: 14.6 % (ref 11.8–14.4)
PHENCYCLIDINE, URINE: NEGATIVE
PLATELET # BLD: 180 K/UL (ref 138–453)
PMV BLD AUTO: 11.2 FL (ref 8.1–13.5)
PROPOXYPHENE, URINE: NORMAL
RBC # BLD: 4.33 M/UL (ref 3.95–5.11)
SARS-COV-2, RAPID: NOT DETECTED
SPECIMEN DESCRIPTION: NORMAL
T. PALLIDUM, IGG: NONREACTIVE
TEST INFORMATION: NORMAL
TRICYCLIC ANTIDEPRESSANTS, UR: NORMAL
WBC # BLD: 6.3 K/UL (ref 3.5–11.3)

## 2021-07-20 PROCEDURE — 3700000001 HC ADD 15 MINUTES (ANESTHESIA): Performed by: OBSTETRICS & GYNECOLOGY

## 2021-07-20 PROCEDURE — 87635 SARS-COV-2 COVID-19 AMP PRB: CPT

## 2021-07-20 PROCEDURE — 2500000003 HC RX 250 WO HCPCS: Performed by: NURSE ANESTHETIST, CERTIFIED REGISTERED

## 2021-07-20 PROCEDURE — 59510 CESAREAN DELIVERY: CPT | Performed by: OBSTETRICS & GYNECOLOGY

## 2021-07-20 PROCEDURE — 7100000000 HC PACU RECOVERY - FIRST 15 MIN: Performed by: OBSTETRICS & GYNECOLOGY

## 2021-07-20 PROCEDURE — 3700000000 HC ANESTHESIA ATTENDED CARE: Performed by: OBSTETRICS & GYNECOLOGY

## 2021-07-20 PROCEDURE — 2580000003 HC RX 258: Performed by: STUDENT IN AN ORGANIZED HEALTH CARE EDUCATION/TRAINING PROGRAM

## 2021-07-20 PROCEDURE — 88307 TISSUE EXAM BY PATHOLOGIST: CPT

## 2021-07-20 PROCEDURE — 1220000000 HC SEMI PRIVATE OB R&B

## 2021-07-20 PROCEDURE — 7100000001 HC PACU RECOVERY - ADDTL 15 MIN: Performed by: OBSTETRICS & GYNECOLOGY

## 2021-07-20 PROCEDURE — 3609079900 HC CESAREAN SECTION: Performed by: OBSTETRICS & GYNECOLOGY

## 2021-07-20 PROCEDURE — 6360000002 HC RX W HCPCS: Performed by: STUDENT IN AN ORGANIZED HEALTH CARE EDUCATION/TRAINING PROGRAM

## 2021-07-20 PROCEDURE — 86901 BLOOD TYPING SEROLOGIC RH(D): CPT

## 2021-07-20 PROCEDURE — 2709999900 HC NON-CHARGEABLE SUPPLY: Performed by: OBSTETRICS & GYNECOLOGY

## 2021-07-20 PROCEDURE — 6370000000 HC RX 637 (ALT 250 FOR IP): Performed by: STUDENT IN AN ORGANIZED HEALTH CARE EDUCATION/TRAINING PROGRAM

## 2021-07-20 PROCEDURE — 96374 THER/PROPH/DIAG INJ IV PUSH: CPT

## 2021-07-20 PROCEDURE — 85027 COMPLETE CBC AUTOMATED: CPT

## 2021-07-20 PROCEDURE — 86850 RBC ANTIBODY SCREEN: CPT

## 2021-07-20 PROCEDURE — 0DNW0ZZ RELEASE PERITONEUM, OPEN APPROACH: ICD-10-PCS | Performed by: OBSTETRICS & GYNECOLOGY

## 2021-07-20 PROCEDURE — 80307 DRUG TEST PRSMV CHEM ANLYZR: CPT

## 2021-07-20 PROCEDURE — 6360000002 HC RX W HCPCS

## 2021-07-20 PROCEDURE — 6360000002 HC RX W HCPCS: Performed by: NURSE ANESTHETIST, CERTIFIED REGISTERED

## 2021-07-20 PROCEDURE — 86900 BLOOD TYPING SEROLOGIC ABO: CPT

## 2021-07-20 PROCEDURE — 86780 TREPONEMA PALLIDUM: CPT

## 2021-07-20 RX ORDER — ONDANSETRON 2 MG/ML
INJECTION INTRAMUSCULAR; INTRAVENOUS
Status: DISCONTINUED
Start: 2021-07-20 | End: 2021-07-20 | Stop reason: WASHOUT

## 2021-07-20 RX ORDER — BUPIVACAINE HYDROCHLORIDE 7.5 MG/ML
INJECTION, SOLUTION INTRASPINAL PRN
Status: DISCONTINUED | OUTPATIENT
Start: 2021-07-20 | End: 2021-07-20 | Stop reason: SDUPTHER

## 2021-07-20 RX ORDER — DOCUSATE SODIUM 100 MG/1
100 CAPSULE, LIQUID FILLED ORAL 2 TIMES DAILY
Qty: 60 CAPSULE | Refills: 0 | Status: SHIPPED | OUTPATIENT
Start: 2021-07-20 | End: 2021-08-19

## 2021-07-20 RX ORDER — NALOXONE HYDROCHLORIDE 0.4 MG/ML
0.4 INJECTION, SOLUTION INTRAMUSCULAR; INTRAVENOUS; SUBCUTANEOUS PRN
Status: DISCONTINUED | OUTPATIENT
Start: 2021-07-20 | End: 2021-07-22 | Stop reason: HOSPADM

## 2021-07-20 RX ORDER — POLYETHYLENE GLYCOL 3350 17 G/17G
17 POWDER, FOR SOLUTION ORAL DAILY PRN
Status: DISCONTINUED | OUTPATIENT
Start: 2021-07-20 | End: 2021-07-22 | Stop reason: HOSPADM

## 2021-07-20 RX ORDER — ONDANSETRON 2 MG/ML
4 INJECTION INTRAMUSCULAR; INTRAVENOUS EVERY 6 HOURS PRN
Status: DISCONTINUED | OUTPATIENT
Start: 2021-07-20 | End: 2021-07-22 | Stop reason: HOSPADM

## 2021-07-20 RX ORDER — SODIUM CHLORIDE, SODIUM LACTATE, POTASSIUM CHLORIDE, CALCIUM CHLORIDE 600; 310; 30; 20 MG/100ML; MG/100ML; MG/100ML; MG/100ML
INJECTION, SOLUTION INTRAVENOUS CONTINUOUS
Status: DISCONTINUED | OUTPATIENT
Start: 2021-07-20 | End: 2021-07-20

## 2021-07-20 RX ORDER — LANOLIN 72 %
OINTMENT (GRAM) TOPICAL
Status: DISCONTINUED | OUTPATIENT
Start: 2021-07-20 | End: 2021-07-22 | Stop reason: HOSPADM

## 2021-07-20 RX ORDER — MORPHINE SULFATE 1 MG/ML
INJECTION, SOLUTION EPIDURAL; INTRATHECAL; INTRAVENOUS PRN
Status: DISCONTINUED | OUTPATIENT
Start: 2021-07-20 | End: 2021-07-20 | Stop reason: SDUPTHER

## 2021-07-20 RX ORDER — ONDANSETRON 2 MG/ML
4 INJECTION INTRAMUSCULAR; INTRAVENOUS EVERY 6 HOURS PRN
Status: DISCONTINUED | OUTPATIENT
Start: 2021-07-20 | End: 2021-07-20

## 2021-07-20 RX ORDER — SODIUM CHLORIDE, SODIUM LACTATE, POTASSIUM CHLORIDE, AND CALCIUM CHLORIDE .6; .31; .03; .02 G/100ML; G/100ML; G/100ML; G/100ML
1000 INJECTION, SOLUTION INTRAVENOUS ONCE
Status: COMPLETED | OUTPATIENT
Start: 2021-07-20 | End: 2021-07-20

## 2021-07-20 RX ORDER — KETOROLAC TROMETHAMINE 30 MG/ML
INJECTION, SOLUTION INTRAMUSCULAR; INTRAVENOUS
Status: COMPLETED
Start: 2021-07-20 | End: 2021-07-20

## 2021-07-20 RX ORDER — METRONIDAZOLE 500 MG/1
500 TABLET ORAL EVERY 8 HOURS SCHEDULED
Status: DISCONTINUED | OUTPATIENT
Start: 2021-07-21 | End: 2021-07-22 | Stop reason: HOSPADM

## 2021-07-20 RX ORDER — BISACODYL 10 MG
10 SUPPOSITORY, RECTAL RECTAL DAILY PRN
Status: DISCONTINUED | OUTPATIENT
Start: 2021-07-20 | End: 2021-07-22 | Stop reason: HOSPADM

## 2021-07-20 RX ORDER — LEVOTHYROXINE SODIUM 0.07 MG/1
75 TABLET ORAL DAILY
Status: DISCONTINUED | OUTPATIENT
Start: 2021-07-21 | End: 2021-07-20

## 2021-07-20 RX ORDER — CEPHALEXIN 500 MG/1
500 CAPSULE ORAL EVERY 8 HOURS SCHEDULED
Status: DISCONTINUED | OUTPATIENT
Start: 2021-07-21 | End: 2021-07-22 | Stop reason: HOSPADM

## 2021-07-20 RX ORDER — HYDROCODONE BITARTRATE AND ACETAMINOPHEN 5; 325 MG/1; MG/1
1 TABLET ORAL EVERY 6 HOURS PRN
Qty: 20 TABLET | Refills: 0 | Status: SHIPPED | OUTPATIENT
Start: 2021-07-20 | End: 2021-07-25

## 2021-07-20 RX ORDER — HYDROCODONE BITARTRATE AND ACETAMINOPHEN 5; 325 MG/1; MG/1
1 TABLET ORAL EVERY 4 HOURS PRN
Status: DISCONTINUED | OUTPATIENT
Start: 2021-07-21 | End: 2021-07-22 | Stop reason: HOSPADM

## 2021-07-20 RX ORDER — PHENYLEPHRINE HCL IN 0.9% NACL 1 MG/10 ML
SYRINGE (ML) INTRAVENOUS PRN
Status: DISCONTINUED | OUTPATIENT
Start: 2021-07-20 | End: 2021-07-20 | Stop reason: SDUPTHER

## 2021-07-20 RX ORDER — DEXAMETHASONE SODIUM PHOSPHATE 10 MG/ML
INJECTION INTRAMUSCULAR; INTRAVENOUS PRN
Status: DISCONTINUED | OUTPATIENT
Start: 2021-07-20 | End: 2021-07-20 | Stop reason: SDUPTHER

## 2021-07-20 RX ORDER — LEVOTHYROXINE SODIUM 0.05 MG/1
50 TABLET ORAL DAILY
Status: DISCONTINUED | OUTPATIENT
Start: 2021-07-21 | End: 2021-07-22 | Stop reason: HOSPADM

## 2021-07-20 RX ORDER — PROMETHAZINE HYDROCHLORIDE 25 MG/ML
25 INJECTION, SOLUTION INTRAMUSCULAR; INTRAVENOUS ONCE
Status: COMPLETED | OUTPATIENT
Start: 2021-07-20 | End: 2021-07-20

## 2021-07-20 RX ORDER — DOCUSATE SODIUM 100 MG/1
100 CAPSULE, LIQUID FILLED ORAL 2 TIMES DAILY
Status: DISCONTINUED | OUTPATIENT
Start: 2021-07-20 | End: 2021-07-22 | Stop reason: HOSPADM

## 2021-07-20 RX ORDER — SODIUM CHLORIDE 9 MG/ML
25 INJECTION, SOLUTION INTRAVENOUS PRN
Status: DISCONTINUED | OUTPATIENT
Start: 2021-07-20 | End: 2021-07-20

## 2021-07-20 RX ORDER — TRISODIUM CITRATE DIHYDRATE AND CITRIC ACID MONOHYDRATE 500; 334 MG/5ML; MG/5ML
30 SOLUTION ORAL ONCE
Status: COMPLETED | OUTPATIENT
Start: 2021-07-20 | End: 2021-07-20

## 2021-07-20 RX ORDER — HYDROCODONE BITARTRATE AND ACETAMINOPHEN 5; 325 MG/1; MG/1
2 TABLET ORAL EVERY 4 HOURS PRN
Status: DISCONTINUED | OUTPATIENT
Start: 2021-07-21 | End: 2021-07-22 | Stop reason: HOSPADM

## 2021-07-20 RX ORDER — IBUPROFEN 800 MG/1
800 TABLET ORAL EVERY 8 HOURS PRN
Status: DISCONTINUED | OUTPATIENT
Start: 2021-07-21 | End: 2021-07-22 | Stop reason: HOSPADM

## 2021-07-20 RX ORDER — VITAMIN A, ASCORBIC ACID, CHOLECALCIFEROL, .ALPHA.-TOCOPHEROL ACETATE, DL-, THIAMINE MONONITRATE, RIBOFLAVIN, NIACINAMIDE, PYRIDOXINE HYDROCHLORIDE, FOLIC ACID, CYANOCOBALAMIN, CALCIUM CARBONATE, IRON, ZINC OXIDE, AND CUPRIC OXIDE 4000; 120; 400; 22; 1.84; 3; 20; 10; 1; 12; 200; 29; 25; 2 [IU]/1; MG/1; [IU]/1; [IU]/1; MG/1; MG/1; MG/1; MG/1; MG/1; UG/1; MG/1; MG/1; MG/1; MG/1
1 TABLET ORAL DAILY
Status: DISCONTINUED | OUTPATIENT
Start: 2021-07-20 | End: 2021-07-22 | Stop reason: HOSPADM

## 2021-07-20 RX ORDER — SODIUM CHLORIDE 0.9 % (FLUSH) 0.9 %
10 SYRINGE (ML) INJECTION PRN
Status: DISCONTINUED | OUTPATIENT
Start: 2021-07-20 | End: 2021-07-20

## 2021-07-20 RX ORDER — SIMETHICONE 80 MG
80 TABLET,CHEWABLE ORAL EVERY 6 HOURS PRN
Status: DISCONTINUED | OUTPATIENT
Start: 2021-07-20 | End: 2021-07-22 | Stop reason: HOSPADM

## 2021-07-20 RX ORDER — SODIUM CHLORIDE 9 MG/ML
25 INJECTION, SOLUTION INTRAVENOUS PRN
Status: DISCONTINUED | OUTPATIENT
Start: 2021-07-20 | End: 2021-07-22 | Stop reason: HOSPADM

## 2021-07-20 RX ORDER — IBUPROFEN 800 MG/1
800 TABLET ORAL EVERY 8 HOURS PRN
Qty: 120 TABLET | Refills: 3 | Status: ON HOLD | OUTPATIENT
Start: 2021-07-21

## 2021-07-20 RX ORDER — DIPHENHYDRAMINE HYDROCHLORIDE 50 MG/ML
25 INJECTION INTRAMUSCULAR; INTRAVENOUS EVERY 6 HOURS PRN
Status: DISCONTINUED | OUTPATIENT
Start: 2021-07-20 | End: 2021-07-22 | Stop reason: HOSPADM

## 2021-07-20 RX ORDER — SODIUM CHLORIDE 0.9 % (FLUSH) 0.9 %
5-40 SYRINGE (ML) INJECTION PRN
Status: DISCONTINUED | OUTPATIENT
Start: 2021-07-20 | End: 2021-07-22 | Stop reason: HOSPADM

## 2021-07-20 RX ORDER — SODIUM CHLORIDE, SODIUM LACTATE, POTASSIUM CHLORIDE, CALCIUM CHLORIDE 600; 310; 30; 20 MG/100ML; MG/100ML; MG/100ML; MG/100ML
INJECTION, SOLUTION INTRAVENOUS CONTINUOUS
Status: DISCONTINUED | OUTPATIENT
Start: 2021-07-20 | End: 2021-07-21

## 2021-07-20 RX ORDER — SODIUM CHLORIDE 0.9 % (FLUSH) 0.9 %
10 SYRINGE (ML) INJECTION EVERY 12 HOURS SCHEDULED
Status: DISCONTINUED | OUTPATIENT
Start: 2021-07-20 | End: 2021-07-20

## 2021-07-20 RX ORDER — ACETAMINOPHEN 500 MG
1000 TABLET ORAL EVERY 6 HOURS PRN
Status: DISCONTINUED | OUTPATIENT
Start: 2021-07-21 | End: 2021-07-22 | Stop reason: HOSPADM

## 2021-07-20 RX ORDER — KETOROLAC TROMETHAMINE 30 MG/ML
30 INJECTION, SOLUTION INTRAMUSCULAR; INTRAVENOUS EVERY 6 HOURS
Status: COMPLETED | OUTPATIENT
Start: 2021-07-20 | End: 2021-07-21

## 2021-07-20 RX ORDER — ONDANSETRON 2 MG/ML
INJECTION INTRAMUSCULAR; INTRAVENOUS PRN
Status: DISCONTINUED | OUTPATIENT
Start: 2021-07-20 | End: 2021-07-20 | Stop reason: SDUPTHER

## 2021-07-20 RX ADMIN — Medication 100 MCG: at 08:17

## 2021-07-20 RX ADMIN — Medication 100 MCG: at 09:03

## 2021-07-20 RX ADMIN — Medication 100 MCG: at 09:00

## 2021-07-20 RX ADMIN — SODIUM CITRATE AND CITRIC ACID MONOHYDRATE 30 ML: 500; 334 SOLUTION ORAL at 07:48

## 2021-07-20 RX ADMIN — CEFAZOLIN 2000 MG: 10 INJECTION, POWDER, FOR SOLUTION INTRAVENOUS at 07:57

## 2021-07-20 RX ADMIN — SODIUM CHLORIDE, POTASSIUM CHLORIDE, SODIUM LACTATE AND CALCIUM CHLORIDE: 600; 310; 30; 20 INJECTION, SOLUTION INTRAVENOUS at 09:12

## 2021-07-20 RX ADMIN — KETOROLAC TROMETHAMINE 30 MG: 30 INJECTION, SOLUTION INTRAMUSCULAR; INTRAVENOUS at 11:30

## 2021-07-20 RX ADMIN — Medication 100 MCG: at 08:55

## 2021-07-20 RX ADMIN — ONDANSETRON 4 MG: 2 INJECTION, SOLUTION INTRAMUSCULAR; INTRAVENOUS at 08:55

## 2021-07-20 RX ADMIN — Medication 100 MCG: at 08:50

## 2021-07-20 RX ADMIN — Medication 100 MCG: at 08:21

## 2021-07-20 RX ADMIN — Medication 100 MCG: at 09:09

## 2021-07-20 RX ADMIN — BUPIVACAINE HYDROCHLORIDE IN DEXTROSE 2 ML: 7.5 INJECTION, SOLUTION SUBARACHNOID at 08:12

## 2021-07-20 RX ADMIN — DEXAMETHASONE SODIUM PHOSPHATE 10 MG: 10 INJECTION INTRAMUSCULAR; INTRAVENOUS at 08:55

## 2021-07-20 RX ADMIN — Medication 100 MCG: at 08:24

## 2021-07-20 RX ADMIN — PROMETHAZINE HYDROCHLORIDE 25 MG: 25 INJECTION INTRAMUSCULAR; INTRAVENOUS at 14:21

## 2021-07-20 RX ADMIN — Medication 200 MCG: at 09:12

## 2021-07-20 RX ADMIN — Medication 100 MCG: at 08:14

## 2021-07-20 RX ADMIN — SODIUM CHLORIDE, POTASSIUM CHLORIDE, SODIUM LACTATE AND CALCIUM CHLORIDE: 600; 310; 30; 20 INJECTION, SOLUTION INTRAVENOUS at 10:41

## 2021-07-20 RX ADMIN — SODIUM CHLORIDE, POTASSIUM CHLORIDE, SODIUM LACTATE AND CALCIUM CHLORIDE: 600; 310; 30; 20 INJECTION, SOLUTION INTRAVENOUS at 07:53

## 2021-07-20 RX ADMIN — Medication 909 ML/HR: at 08:54

## 2021-07-20 RX ADMIN — Medication 100 MCG: at 08:58

## 2021-07-20 RX ADMIN — Medication 100 MCG: at 08:30

## 2021-07-20 RX ADMIN — SODIUM CHLORIDE, POTASSIUM CHLORIDE, SODIUM LACTATE AND CALCIUM CHLORIDE 1000 ML: 600; 310; 30; 20 INJECTION, SOLUTION INTRAVENOUS at 06:50

## 2021-07-20 RX ADMIN — SODIUM CHLORIDE, POTASSIUM CHLORIDE, SODIUM LACTATE AND CALCIUM CHLORIDE: 600; 310; 30; 20 INJECTION, SOLUTION INTRAVENOUS at 08:52

## 2021-07-20 RX ADMIN — Medication 100 MCG: at 08:40

## 2021-07-20 RX ADMIN — MORPHINE SULFATE 0.2 MG: 1 INJECTION, SOLUTION EPIDURAL; INTRATHECAL; INTRAVENOUS at 08:12

## 2021-07-20 RX ADMIN — KETOROLAC TROMETHAMINE 30 MG: 30 INJECTION, SOLUTION INTRAMUSCULAR; INTRAVENOUS at 17:26

## 2021-07-20 ASSESSMENT — PULMONARY FUNCTION TESTS
PIF_VALUE: 0
PIF_VALUE: 1
PIF_VALUE: 0
PIF_VALUE: 1
PIF_VALUE: 0

## 2021-07-20 ASSESSMENT — PAIN SCALES - GENERAL: PAINLEVEL_OUTOF10: 0

## 2021-07-20 NOTE — ANESTHESIA PRE PROCEDURE
Department of Anesthesiology  Preprocedure Note       Name:  Grecia Oliver   Age:  27 y.o.  :  1990                                          MRN:  [id number]         Date:  2021      Surgeon: Tiffany Wilson):  Cosme Hester, DO    Procedure: Procedure(s):   SECTION    Medications prior to admission:   Prior to Admission medications    Medication Sig Start Date End Date Taking? Authorizing Provider   levothyroxine (SYNTHROID) 75 MCG tablet Take 1 tablet by mouth daily 21   Cosme Hester, DO   famotidine (PEPCID) 20 MG tablet Take 1 tablet by mouth 2 times daily  Patient not taking: Reported on 2021   Cosme So, DO   Prenatal Multivit-Min-Fe-FA (PRENATAL VITAMINS PO) Take 1 tablet by mouth daily    Historical Provider, MD       Current medications:    Current Facility-Administered Medications   Medication Dose Route Frequency Provider Last Rate Last Admin    lactated ringers infusion   Intravenous Continuous Josh Erm, DO        lactated ringers bolus  1,000 mL Intravenous Once [place] Erm, DO        sodium chloride flush 0.9 % injection 10 mL  10 mL Intravenous 2 times per day [place] Erm, DO        sodium chloride flush 0.9 % injection 10 mL  10 mL Intravenous PRN [place] Erm, DO        0.9 % sodium chloride infusion  25 mL Intravenous PRN Josh Erm, DO        citric acid-sodium citrate (BICITRA) solution 30 mL  30 mL Oral Once [place] Erm, DO        ondansetron (ZOFRAN) injection 4 mg  4 mg Intravenous Q6H PRN [place] Erm, DO        ceFAZolin (ANCEF) 2000 mg in dextrose 5 % 50 mL IVPB  2,000 mg Intravenous Once [place] Erm, DO           Allergies:  No Known Allergies    Problem List:    Patient Active Problem List   Diagnosis Code    PCOS (polycystic ovarian syndrome) E28.2    Influenza vaccine needed Z23    h/o  x1 Z98.891    Hypothyroidism E03.9    Pregnancy conceived with femara O09.90    Fetal cystic/echogenic kidney O36. 92X0    Multiple fetal congenital anomalies O35. 8XX0    Need for Tdap vaccination Z23    High-risk pregnancy in third trimester O09.93       Past Medical History:        Diagnosis Date    Hypothyroidism     PCOS (polycystic ovarian syndrome)        Past Surgical History:        Procedure Laterality Date     SECTION  2016       Social History:    Social History     Tobacco Use    Smoking status: Never Smoker    Smokeless tobacco: Never Used   Substance Use Topics    Alcohol use: No     Alcohol/week: 0.0 standard drinks                                Counseling given: Not Answered      Vital Signs (Current): There were no vitals filed for this visit. BP Readings from Last 3 Encounters:   21 102/84   21 96/61   21 (!) 96/56       NPO Status:                                                                                 BMI:   Wt Readings from Last 3 Encounters:   21 166 lb (75.3 kg)   21 167 lb (75.8 kg)   21 165 lb (74.8 kg)     There is no height or weight on file to calculate BMI.    CBC:   Lab Results   Component Value Date    WBC 8.2 2021    RBC 3.85 2021    HGB 11.7 2021    HCT 35.5 2021    MCV 92.2 2021    RDW 13.9 2021     2021       CMP:   Lab Results   Component Value Date    GLUCOSE 143 2021    GLUCOSE 84 2014       POC Tests: No results for input(s): POCGLU, POCNA, POCK, POCCL, POCBUN, POCHEMO, POCHCT in the last 72 hours.     Coags: No results found for: PROTIME, INR, APTT    HCG (If Applicable):   Lab Results   Component Value Date    PREGTESTUR positive 2020    HCGQUANT <1 2019        ABGs: No results found for: PHART, PO2ART, AIY8OEV, EOW9ZIO, BEART, Z2OBAWBE     Type & Screen (If Applicable):  No results found for: LABABO, LABRH    Drug/Infectious Status (If Applicable):  No results found for: HIV, HEPCAB    COVID-19 Screening (If Applicable): No results found for: COVID19        Anesthesia Evaluation  Patient summary reviewed no history of anesthetic complications:   Airway: Mallampati: II        Dental:          Pulmonary:Negative Pulmonary ROS and normal exam  breath sounds clear to auscultation                             Cardiovascular:Negative CV ROS  Exercise tolerance: good (>4 METS),           Rhythm: regular  Rate: normal                    Neuro/Psych:   Negative Neuro/Psych ROS              GI/Hepatic/Renal: Neg GI/Hepatic/Renal ROS            Endo/Other:    (+) hypothyroidism::., .                 Abdominal:             Vascular: negative vascular ROS. Other Findings:             Anesthesia Plan      spinal     ASA 2             Anesthetic plan and risks discussed with patient. Plan discussed with CRNA.             Fetal cystic/echogenic kidney   Multiple fetal congenital anomalies             Cheri Reed MD   7/20/2021

## 2021-07-20 NOTE — FLOWSHEET NOTE
Patient admitted to room 742 from L&D via bed. Oriented to room and surroundings. Plan of care reviewed. Verbalized understanding. Instructed on infant security and safe sleep practices. Preventing falls education provided . The following handouts given: A New Beginning: Your Guide to Postpartum Care, Rounding, gs Security System,Babies Cry A lot, Safe Sleep, Security and Visitation Guidelines. Call light placed within reach.

## 2021-07-20 NOTE — BRIEF OP NOTE
Department of Obstetrics and Gynecology  Obstetrical Brief Operative Report  [facility]    Patient: Grecia Oliver   : 1990  MRN: [id number]       Acct: [de-identified]   Date of Procedure: 21    Pre-operative Diagnosis: 27 y.o. female  at 39w4d  Scheduled RLTCS  Hx C/S x1, declining TOLAC  Femara pregnancy  Hypothyroidism  Elevated 1 hr GTT, 3hr within normal limits  BMI 30      Post-operative Diagnosis: Living  female infant, pelvic adhesive disease    Procedure: repeat low transverse  section with lysis of adhesions    Surgeon: Dr. Jkae Arnett  Assistant(s): Dusty Osuna; Jimi Deutsch PGY2; Sergei Perdomo PGY1    Anesthesia: spinal with Duramorph    Information for the patient's :  Airam Maloney [[id number]]   female   Birth Weight: 8 lb 1.3 oz (3.665 kg)     Information for the patient's :  Airam Maloney [[id number]]          Findings:  Live Born 8 lb 1 oz female infant in cephalic presentation with Apgars of 8 at 1 minute and 9 at five minutes, normal appearing uterus tubes and ovaries. Pelvic adhesions noted from anterior surface of lower uterine segment to anterior abdominal wall. Quantitative Blood Loss: 750ml immediately post-operatively  Total IV Fluids: 1500ml  Urine output: 200 mL clear urine   Drains:  almanza catheter  Specimens:  placenta sent to pathology, cord blood and cord gases  Instrument and Sponge Count: Correct  Complications: none  Condition: Infant stable, transfer to [address], Mother stable, transfer to post anesthesia recovery    See operative report for full details. Sergei Perdomo DO  Ob/Gyn Resident  2021, 9:55 AM     Resident Physician Statement  I have discussed the case, including pertinent history and exam findings with the above resident. I have personally seen the patient. I agree with the assessment, plan and orders as documented.  I have made changes to the above note as needed. I have discussed the case with above named attending. Renee Loya DO  OB/GYN Resident PGY4  7/20/2021  9:59 AM             Attending Physician Statement  I have discussed the care of Lizzie Alexander, including pertinent history and exam findings,  with the resident. I have seen and examined the patient and the key elements of all parts of the encounter have been performed by me. I was scrubbed and present for entire procedure. I agree with the assessment, plan and orders as documented by the resident.   [facility] Modifier)    Electronically signed by Rome Case DO on 7/20/2021 at 10:17 AM

## 2021-07-20 NOTE — LACTATION NOTE
Assist pt with breastfeeding while in recovery, laid back nursing, wide latch. Pt has flat/inverted nipples and  latches well to sandwiched breast tissue, several audible swallows. Came off breast and back on, nipple round and protruding post feed. Reviewed deep latch with pt, encouraged to call for latch assist as needed. Reviewed normal  feeding patterns. Pt pumped for her first born for several months due to difficulties latching. Encouraged pt to reach out for support. Breastfeeding education given.

## 2021-07-20 NOTE — FLOWSHEET NOTE
Writer RN, Dr Treasure Klein and Dr Soraida Cervantess at bedside. Plan of care discussed with pt per Dr Treasure Klein. SVE fingerip/thick/high posterior. Plan discussed and recommened c/s  Per Dr Treasure Klein and Dr Hester versus TOLAC. Dr Treasure Klein discusses pelvic shape, and unfavorable cervix with pt, also reason for last c/s. Pt had questions and concerns. Pt seemed hesitant, Rn offered for fob to come into room so pt could talk with him regarding her decision. VS taken, EFM placed. Rn will return to room after pt talks with fob.

## 2021-07-20 NOTE — ANESTHESIA PROCEDURE NOTES
Spinal Block    Patient location during procedure: OR  Reason for block: primary anesthetic  Staffing  Performed: resident/CRNA   Anesthesiologist: Herson Alvarez MD  Resident/CRNA: RON Marin CRNA  Preanesthetic Checklist  Completed: patient identified, IV checked, site marked, risks and benefits discussed, surgical consent, monitors and equipment checked, pre-op evaluation, timeout performed, anesthesia consent given, oxygen available and patient being monitored  Spinal Block  Patient position: sitting  Prep: Betadine  Patient monitoring: cardiac monitor, continuous pulse ox and frequent blood pressure checks  Approach: midline  Location: L3/L4  Provider prep: mask and sterile gloves  Local infiltration: lidocaine  Dose: 0.2  Agent: bupivacaine  Adjuvant: duramorph  Dose: 2  Dose: 2  Needle  Needle type: pencil-tip   Needle gauge: 24 G  Needle length: 4 in  Assessment  Sensory level: T4  Events: cerebrospinal fluid  Swirl obtained: Yes  CSF: clear  Attempts: 1  Hemodynamics: stable  Additional Notes  Atraumatic, No apparent complications

## 2021-07-20 NOTE — ANESTHESIA POSTPROCEDURE EVALUATION
Department of Anesthesiology  Postprocedure Note    Patient: Allen Hurst  MRN: [id number]  [place]: 1990  Date of evaluation: 2021  Time:  10:36 AM     Procedure Summary     Date: 21 Room / Location: [facility] OR [address] [place] [address]    Anesthesia Start: [address] Anesthesia Stop: 1000    Procedure:  SECTION (N/A Abdomen) Diagnosis:     Surgeons: Carline Armstrong DO Responsible Provider: Sujata Villarreal MD    Anesthesia Type: spinal ASA Status: 2          Anesthesia Type: spinal    Dionne Phase I: Dionne Score: 10    Dionne Phase II:      Last vitals: Reviewed and per EMR flowsheets.        Anesthesia Post Evaluation    Patient location during evaluation: PACU  Patient participation: complete - patient participated  Level of consciousness: awake  Pain score: 1  Airway patency: patent  Nausea & Vomiting: no nausea and no vomiting  Complications: no  Cardiovascular status: blood pressure returned to baseline and hemodynamically stable  Respiratory status: acceptable  Hydration status: euvolemic

## 2021-07-21 LAB
ABSOLUTE EOS #: <0.03 K/UL (ref 0–0.44)
ABSOLUTE IMMATURE GRANULOCYTE: 0.11 K/UL (ref 0–0.3)
ABSOLUTE LYMPH #: 1.64 K/UL (ref 1.1–3.7)
ABSOLUTE MONO #: 1.17 K/UL (ref 0.1–1.2)
BASOPHILS # BLD: 0 % (ref 0–2)
BASOPHILS ABSOLUTE: <0.03 K/UL (ref 0–0.2)
DIFFERENTIAL TYPE: ABNORMAL
EOSINOPHILS RELATIVE PERCENT: 0 % (ref 1–4)
HCT VFR BLD CALC: 31.7 % (ref 36.3–47.1)
HEMOGLOBIN: 9.8 G/DL (ref 11.9–15.1)
IMMATURE GRANULOCYTES: 1 %
LYMPHOCYTES # BLD: 15 % (ref 24–43)
MCH RBC QN AUTO: 29.5 PG (ref 25.2–33.5)
MCHC RBC AUTO-ENTMCNC: 30.9 G/DL (ref 28.4–34.8)
MCV RBC AUTO: 95.5 FL (ref 82.6–102.9)
MONOCYTES # BLD: 11 % (ref 3–12)
NRBC AUTOMATED: 0 PER 100 WBC
PDW BLD-RTO: 14.5 % (ref 11.8–14.4)
PLATELET # BLD: 160 K/UL (ref 138–453)
PLATELET ESTIMATE: ABNORMAL
PMV BLD AUTO: 11.4 FL (ref 8.1–13.5)
RBC # BLD: 3.32 M/UL (ref 3.95–5.11)
RBC # BLD: ABNORMAL 10*6/UL
SEG NEUTROPHILS: 73 % (ref 36–65)
SEGMENTED NEUTROPHILS ABSOLUTE COUNT: 7.94 K/UL (ref 1.5–8.1)
WBC # BLD: 10.9 K/UL (ref 3.5–11.3)
WBC # BLD: ABNORMAL 10*3/UL

## 2021-07-21 PROCEDURE — 85025 COMPLETE CBC W/AUTO DIFF WBC: CPT

## 2021-07-21 PROCEDURE — 6360000002 HC RX W HCPCS: Performed by: STUDENT IN AN ORGANIZED HEALTH CARE EDUCATION/TRAINING PROGRAM

## 2021-07-21 PROCEDURE — 1220000000 HC SEMI PRIVATE OB R&B

## 2021-07-21 PROCEDURE — 36415 COLL VENOUS BLD VENIPUNCTURE: CPT

## 2021-07-21 PROCEDURE — 6370000000 HC RX 637 (ALT 250 FOR IP): Performed by: STUDENT IN AN ORGANIZED HEALTH CARE EDUCATION/TRAINING PROGRAM

## 2021-07-21 RX ORDER — LANOLIN ALCOHOL/MO/W.PET/CERES
325 CREAM (GRAM) TOPICAL 2 TIMES DAILY
Qty: 60 TABLET | Refills: 3 | Status: ON HOLD | OUTPATIENT
Start: 2021-07-21

## 2021-07-21 RX ADMIN — DOCUSATE SODIUM 100 MG: 100 CAPSULE, LIQUID FILLED ORAL at 21:30

## 2021-07-21 RX ADMIN — HYDROCODONE BITARTRATE AND ACETAMINOPHEN 1 TABLET: 5; 325 TABLET ORAL at 04:50

## 2021-07-21 RX ADMIN — METRONIDAZOLE 500 MG: 500 TABLET ORAL at 12:37

## 2021-07-21 RX ADMIN — HYDROCODONE BITARTRATE AND ACETAMINOPHEN 1 TABLET: 5; 325 TABLET ORAL at 22:46

## 2021-07-21 RX ADMIN — METRONIDAZOLE 500 MG: 500 TABLET ORAL at 22:47

## 2021-07-21 RX ADMIN — METRONIDAZOLE 500 MG: 500 TABLET ORAL at 04:50

## 2021-07-21 RX ADMIN — CEPHALEXIN 500 MG: 500 CAPSULE ORAL at 22:47

## 2021-07-21 RX ADMIN — LEVOTHYROXINE SODIUM 50 MCG: 50 TABLET ORAL at 06:50

## 2021-07-21 RX ADMIN — IBUPROFEN 800 MG: 800 TABLET, FILM COATED ORAL at 17:22

## 2021-07-21 RX ADMIN — DOCUSATE SODIUM 100 MG: 100 CAPSULE, LIQUID FILLED ORAL at 08:17

## 2021-07-21 RX ADMIN — Medication 1 TABLET: at 08:17

## 2021-07-21 RX ADMIN — IBUPROFEN 800 MG: 800 TABLET, FILM COATED ORAL at 06:50

## 2021-07-21 RX ADMIN — CEPHALEXIN 500 MG: 500 CAPSULE ORAL at 12:37

## 2021-07-21 RX ADMIN — DOCUSATE SODIUM 100 MG: 100 CAPSULE, LIQUID FILLED ORAL at 00:45

## 2021-07-21 RX ADMIN — HYDROCODONE BITARTRATE AND ACETAMINOPHEN 1 TABLET: 5; 325 TABLET ORAL at 12:36

## 2021-07-21 RX ADMIN — HYDROCODONE BITARTRATE AND ACETAMINOPHEN 1 TABLET: 5; 325 TABLET ORAL at 08:38

## 2021-07-21 RX ADMIN — HYDROCODONE BITARTRATE AND ACETAMINOPHEN 1 TABLET: 5; 325 TABLET ORAL at 17:22

## 2021-07-21 RX ADMIN — CEPHALEXIN 500 MG: 500 CAPSULE ORAL at 04:50

## 2021-07-21 RX ADMIN — KETOROLAC TROMETHAMINE 30 MG: 30 INJECTION, SOLUTION INTRAMUSCULAR; INTRAVENOUS at 00:44

## 2021-07-21 ASSESSMENT — PAIN SCALES - GENERAL
PAINLEVEL_OUTOF10: 2
PAINLEVEL_OUTOF10: 0
PAINLEVEL_OUTOF10: 4
PAINLEVEL_OUTOF10: 3
PAINLEVEL_OUTOF10: 4

## 2021-07-21 NOTE — OP NOTE
Operative Note  Department of Obstetrics and Gynecology  [facility]     Patient: Tracy Molina   : 1990  MRN: [id number]       Acct: [de-identified]   PCP: Federica Gutiérrez PA-C  Date of Procedure: 21    Pre-operative Diagnosis: 27 y.o. female  at 39w4d  Hx C/S x1, declining TOLAC  Femara pregnancy  Hypothyroidism  PCOS  Elevated 1 hr GTT, normal 3 hr   BMI 30    Post-operative Diagnosis: Living  female infant, Pelvic adhesive disease    Procedure: repeat low transverse  section with lysis of adhesions    Indications: Tracy Molina 27 y.o. Hussein Gastelum at 39w4d admitted for scheduled repeat C/S. Patient with h/o  x1 and declining TOLAC. Risks, benefits, and adverse events of C/S discussed with patient. Patient elected to proceed with C/S. Informed consent was obtained. Surgeon: Dr. Vivien Roche  Assistants: Alok Jimenez; Payal Whitney PGY2; Dago Ibarra PGY1    Anesthesia: spinal with duramorph    Procedure Details   The patient was seen pre-operatively. The risks, benefits, complications, treatment options, and expected outcomes were discussed with the patient. The patient concurred with the proposed plan, giving informed consent. The patient was taken to the Operating Room, identified as Tracy Molina and the procedure verified as  Delivery. A Time Out was held and the above information confirmed. After spinal anesthesia, the patient was draped and prepped in the usual sterile manner. A Pfannenstiel incision was made and carried down through the subcutaneous tissue to the fascia using scalpel. Fascial incision was made and extended transversely using feliz scissors for sharp dissection. The fascia was  from the underlying rectus tissue superiorly and inferiorly using feliz scissors and blunt dissection. The peritoneum was identified and entered bluntly.  Moderate pelvic adhesions were noted from the anterior surface of the lower uterine segment to the abdominal wall, more on the left than right. Adhesions were clamped with Danica clamp x2, cut, and tied with 0-Vicryl free tie. Once adhesions were cleared,  peritoneal incision was extended longitudinally with blunt stretch, bladder retractor was placed. A low transverse uterine incision was made using a new scalpel blade. Blunt stretch on the hysterotomy incision was made and the amniotomy was performed revealing clear fluid. Delivered from cephalic presentation was a Live Born female infant. The infant was suctioned, dried and the umbilical cord was clamped and cut after one minute delayed cord clamping. The infant was taken to the warmer and attended by NICU for evaluation. A second section of cord was clamped and cut and sent for gases. Cord blood was obtained for evaluation. The placenta was removed spontaneously with gentle traction and appeared intact, whole and that the umbilical cord had three vessels noted. Pitocin was started. The uterine outline appeared normal. The uterus was exteriorized and cleaned of all clots and debris. The uterine incision was closed with running sutures of 0 Monocryl. An imbricating layer was placed with 0 Monocryl in running fashion. A figure of eight suture was needed in the midline of the hysterotomy incision to obtain excellent hemostasis. Hemostasis was observed. The uterus was reintroduced into the abdominal cavity. Bilateral abdominal gutters were cleared of all clots and debris. Bilateral tubes and ovaries were visualized and appeared normal. The hysterotomy was again inspected and found to be hemostatic. The fascia was then reapproximated with running sutures of 0 Vicryl. The subcuticular space was irrigated copiously. The subcuticular space was closed using a 2-0 plain gut suture in a running fashion. The skin was reapproximated with a 4-0 Monocryl. The skin was then cleansed and dressed with a Silver bandage in sterile fashion. Instrument, sponge, and needle counts were correct prior the abdominal closure and at the conclusion of the case. The urine remained clear throughout the case. Ancef was given for antibiotic prophylaxis. SCDs for DVT prophylaxis remain in place for the post operative period. Dr. Jes Beatty was present for the entire operation. Findings:  Live Born 8 lb 1 oz female infant in cephalic presentation with Apgars of 8 at 1 minute and 9 at five minutes, normal appearing uterus tubes and ovaries. Pelvic adhesions noted from anterior surface of lower uterine segment to anterior abdominal wall. Quantitative Blood Loss: 1050ml   Total IV Fluids: 1500ml  Urine output: 200 mL clear urine   Drains:  almanza catheter  Specimens:  placenta sent to pathology, cord blood and cord gases  Instrument and Sponge Count: Correct  Complications: none  Condition: Infant stable, transfer to [address], Mother stable, transfer to post anesthesia recovery      Jace Bradshaw DO  OB/GYN Resident  7/20/2021, 8:20 PM      This dictation was performed by a resident physician and then was thoroughly reviewed by the attending prior to being signed. Resident Physician Statement  I have discussed the case, including pertinent history and exam findings with the above resident. I have personally seen the patient. I agree with the assessment, plan and orders as documented. I have made changes to the above note as needed. I have discussed the case with above named attending. Riley López DO  OB/GYN Resident PGY4  7/20/2021  8:56 PM          Attending Physician Statement  I have discussed the care of Kayla Esquivel, including pertinent history and exam findings,  with the resident. I have seen and examined the patient and the key elements of all parts of the encounter have been performed by me. I was scrubbed and present for the entire procedure. I agree with the assessment, plan and orders as documented by the resident. [facility] Modifier)    Electronically signed by Dina Horta DO on 7/21/2021 at 7:31 AM

## 2021-07-21 NOTE — PLAN OF CARE
signs or symptoms of impaired coagulation  Description: Absence of signs or symptoms of impaired coagulation  Outcome: Ongoing

## 2021-07-21 NOTE — LACTATION NOTE
Positioned mom and baby in side lying on the right side. Shown how to support baby and breast during  feeding. Baby slides off at rest applied nipple shield small baby able to latch more deeply.

## 2021-07-21 NOTE — LACTATION NOTE
Mom states breast feeding going ok -describes shaft and nipple feeding mom states she has inverted nipples. Mom able to \"sandwich to get baby latched\" ,mom will call for help as needed. Written information reviewed.

## 2021-07-21 NOTE — PLAN OF CARE
Problem: Preoperative Routine:  Goal: Will comply with preparation for surgery or procedure  Description: Will comply with preparation for surgery or procedure  7/21/2021 1247 by Sheryle Co, RN  Outcome: Ongoing  7/21/2021 0616 by Sheridan Omer RN  Outcome: Ongoing     Problem: Pain:  Goal: Pain level will decrease  Description: Pain level will decrease  7/21/2021 1247 by Sheryle Co, RN  Outcome: Ongoing  7/21/2021 0616 by Sheridan Omer RN  Outcome: Ongoing  Goal: Control of acute pain  Description: Control of acute pain  7/21/2021 1247 by Sheryle Co, RN  Outcome: Ongoing  7/21/2021 0616 by Sheridan Omer RN  Outcome: Ongoing  Goal: Control of chronic pain  Description: Control of chronic pain  7/21/2021 1247 by Sheryle Co, RN  Outcome: Ongoing  7/21/2021 0616 by Sheridan Omer RN  Outcome: Ongoing     Problem: Discharge Planning:  Goal: Discharged to appropriate level of care  Description: Discharged to appropriate level of care  7/21/2021 1247 by Sheryle Co, RN  Outcome: Ongoing  7/21/2021 0616 by Sheridan Omer RN  Outcome: Ongoing     Problem: Fluid Volume - Imbalance:  Goal: Absence of postpartum hemorrhage signs and symptoms  Description: Absence of postpartum hemorrhage signs and symptoms  7/21/2021 1247 by Sheryle Co, RN  Outcome: Ongoing  7/21/2021 0616 by Sheridan Omer RN  Outcome: Ongoing  Goal: Absence of imbalanced fluid volume signs and symptoms  Description: Absence of imbalanced fluid volume signs and symptoms  7/21/2021 1247 by Sheryle Co, RN  Outcome: Ongoing  7/21/2021 0616 by Sheridan Omer RN  Outcome: Ongoing     Problem: Infection - Surgical Site:  Goal: Will show no infection signs and symptoms  Description: Will show no infection signs and symptoms  7/21/2021 1247 by Sheryle Co, RN  Outcome: Ongoing  7/21/2021 0616 by Sheridan Omer RN  Outcome: Ongoing     Problem: Mood - Altered:  Goal: Mood stable  Description: Mood stable  7/21/2021 1247 by Sheryle Co, RN  Outcome: Ongoing  7/21/2021 0616 by Sheridan Omer RN  Outcome: Ongoing     Problem: Nausea/Vomiting:  Goal: Absence of nausea/vomiting  Description: Absence of nausea/vomiting  7/21/2021 1247 by Sheryle Co, RN  Outcome: Ongoing  7/21/2021 0616 by Sheridan Omer RN  Outcome: Ongoing     Problem: Pain - Acute:  Goal: Pain level will decrease  Description: Pain level will decrease  7/21/2021 1247 by Sheryle Co, RN  Outcome: Ongoing  7/21/2021 0616 by Sheridan Omer RN  Outcome: Ongoing     Problem: Urinary Retention:  Goal: Urinary elimination within specified parameters  Description: Urinary elimination within specified parameters  7/21/2021 1247 by Sheryle Co, RN  Outcome: Ongoing  7/21/2021 0616 by Sheridan Omer RN  Outcome: Ongoing     Problem: Venous Thromboembolism:  Goal: Will show no signs or symptoms of venous thromboembolism  Description: Will show no signs or symptoms of venous thromboembolism  7/21/2021 1247 by Sheryle Co, RN  Outcome: Ongoing  7/21/2021 0616 by Sheridan Omer RN  Outcome: Ongoing  Goal: Absence of signs or symptoms of impaired coagulation  Description: Absence of signs or symptoms of impaired coagulation  7/21/2021 1247 by Sheryle Co, RN  Outcome: Ongoing  7/21/2021 0616 by Sheridan Omer RN  Outcome: Ongoing

## 2021-07-21 NOTE — PROGRESS NOTES
Patient ambulates to bathroom and is able to void 350. Leanna care performed per self and returns to bed without issue.

## 2021-07-21 NOTE — PROGRESS NOTES
POST OPERATIVE DAY # 1110 Cierra Amezquita is a 27 y.o. female   This patient was seen and examined today. Her pregnancy was complicated by:   Patient Active Problem List   Diagnosis    PCOS (polycystic ovarian syndrome)    Influenza vaccine needed    h/o  x1    Hypothyroidism    Pregnancy conceived with femara    Fetal cystic/echogenic kidney    Multiple fetal congenital anomalies    Need for Tdap vaccination    High-risk pregnancy in third trimester    39 weeks gestation of pregnancy    RLTCS 21 F Apg 8/9 Wt 8#1       Today she is doing well without any chief complaint. Her lochia is light. She denies chest pain, shortness of breath, headache, blurred vision and peripheral edema. She is  breast feeding and she denies any signs or symptoms of mastitis. She admits to breast tenderness with feeding. She is ambulating well. She recently had her almanza catheter removed and has yet to void spontaneously. She currently denies S/S of postpartum depression. Flatus present. Bowel movement absent. She is tolerating solids.     Vital Signs:  Vitals:    21 1210 21 1700 21 2030 21 0100   BP: 110/65 (!) 99/53 (!) 97/55 (!) 91/47   Pulse: 62 75 77 70   Resp: 20 16 16 16   Temp: 98.5 °F (36.9 °C) 97.8 °F (36.6 °C) 98.1 °F (36.7 °C) 98.6 °F (37 °C)   TempSrc:       SpO2: 98% 98% 95% 95%   Weight:       Height:             Urine Input & Output last 24hrs:     Intake/Output Summary (Last 24 hours) at 2021 0144  Last data filed at 2021 0100  Gross per 24 hour   Intake 2979 ml   Output 5250 ml   Net -2271 ml       Physical Exam:  General:  no apparent distress, alert and cooperative  Neurologic:  alert, oriented, normal speech, no focal findings or movement disorder noted  Lungs:  No increased work of breathing, good air exchange  Abdomen: abdomen soft, non-distended, non-tender  Fundus: non-tender, normal size, firm, below umbilicus  Incision: Silver dressing in place with minimal serosanguineous discharge  Extremities:  no calf tenderness, non edematous    Labs:  Lab Results   Component Value Date    WBC 10.9 07/21/2021    HGB 9.8 (L) 07/21/2021    HCT 31.7 (L) 07/21/2021    MCV 95.5 07/21/2021     07/21/2021       Assessment/Plan:  1. David Higgins is a [id number] POD # 1 s/p RLTCS   - Doing well, VSS    - Female infant in [address]   - Encourage ambulation and use of incentive spirometer   - S/p almanza catheter and saline lock IV this AM    - CBC completed. Hgb stable at 9.8   - Pain controlled with Norco/Mortrin  2. Rh positive/Rubella immune  3. Breast feeding   - Denies s/s of mastitis  4. Anemia   - Hgb POD#1 9.8 (12.7 on admission)   - Clinically asymptomatic   - Iron supplementation ordered     5. Hypothyroidism   - Synthroid decreased from 75mcg to pre pregnancy dosing of 50 mcg QD    - Clinically asymptomatic   - TSH 1.25, T4 1.14 on 7/14/21   - Encourage close follow up PP    6. BMI 30   - SCDs for DVT prophylaxis    7. Continue post-op care. Counseling Completed:  Secondary Smoke risks and Sudden Infant Death Syndrome were reviewed with recommendations. Infant sleeping, \"back to sleep\" and avoidance of co-sleeping recommendations were reviewed. Signs and Symptoms of Post Partum Depression were reviewed. The patient is to call if any occur. Signs and symptoms of Mastitis were reviewed. The patient is to call if any occur for follow up. Discharge instructions including pelvic rest, incision care, 15 lb weight restriction, no driving with pain medicine and office follow-up were reviewed with patient     Attending Physician: Dr. Sherryle Bollard,   Ob/Gyn Resident  7/21/2021, 6:13 AM     Resident Physician Statement  I have discussed the case, including pertinent history and exam findings with the above resident. I have personally seen the patient. I agree with the assessment, plan and orders as documented.  I have made changes to the above note as needed. I have discussed the case with above named attending.          Stuart Mendoza,   OB/GYN Resident PGY4  7/21/2021  7:00 AM

## 2021-07-22 VITALS
SYSTOLIC BLOOD PRESSURE: 104 MMHG | OXYGEN SATURATION: 99 % | BODY MASS INDEX: 30.55 KG/M2 | HEART RATE: 81 BPM | DIASTOLIC BLOOD PRESSURE: 62 MMHG | WEIGHT: 166 LBS | RESPIRATION RATE: 18 BRPM | TEMPERATURE: 97.9 F | HEIGHT: 62 IN

## 2021-07-22 LAB — SURGICAL PATHOLOGY REPORT: NORMAL

## 2021-07-22 PROCEDURE — 6370000000 HC RX 637 (ALT 250 FOR IP): Performed by: STUDENT IN AN ORGANIZED HEALTH CARE EDUCATION/TRAINING PROGRAM

## 2021-07-22 RX ORDER — LEVOTHYROXINE SODIUM 0.05 MG/1
50 TABLET ORAL DAILY
Qty: 30 TABLET | Refills: 0 | Status: ON HOLD | OUTPATIENT
Start: 2021-07-22

## 2021-07-22 RX ADMIN — LEVOTHYROXINE SODIUM 50 MCG: 50 TABLET ORAL at 06:29

## 2021-07-22 RX ADMIN — IBUPROFEN 800 MG: 800 TABLET, FILM COATED ORAL at 05:24

## 2021-07-22 RX ADMIN — DOCUSATE SODIUM 100 MG: 100 CAPSULE, LIQUID FILLED ORAL at 09:26

## 2021-07-22 RX ADMIN — CEPHALEXIN 500 MG: 500 CAPSULE ORAL at 06:29

## 2021-07-22 RX ADMIN — HYDROCODONE BITARTRATE AND ACETAMINOPHEN 2 TABLET: 5; 325 TABLET ORAL at 05:24

## 2021-07-22 RX ADMIN — Medication 1 TABLET: at 09:26

## 2021-07-22 RX ADMIN — METRONIDAZOLE 500 MG: 500 TABLET ORAL at 06:29

## 2021-07-22 RX ADMIN — HYDROCODONE BITARTRATE AND ACETAMINOPHEN 2 TABLET: 5; 325 TABLET ORAL at 09:26

## 2021-07-22 ASSESSMENT — PAIN DESCRIPTION - PROGRESSION
CLINICAL_PROGRESSION: GRADUALLY IMPROVING
CLINICAL_PROGRESSION: GRADUALLY IMPROVING

## 2021-07-22 ASSESSMENT — PAIN DESCRIPTION - FREQUENCY
FREQUENCY: INTERMITTENT
FREQUENCY: INTERMITTENT

## 2021-07-22 ASSESSMENT — PAIN DESCRIPTION - LOCATION
LOCATION: ABDOMEN
LOCATION: ABDOMEN

## 2021-07-22 ASSESSMENT — PAIN SCALES - GENERAL
PAINLEVEL_OUTOF10: 7
PAINLEVEL_OUTOF10: 8
PAINLEVEL_OUTOF10: 5
PAINLEVEL_OUTOF10: 4

## 2021-07-22 ASSESSMENT — PAIN DESCRIPTION - DESCRIPTORS
DESCRIPTORS: ACHING;DULL
DESCRIPTORS: ACHING;DULL

## 2021-07-22 ASSESSMENT — PAIN DESCRIPTION - PAIN TYPE
TYPE: SURGICAL PAIN
TYPE: SURGICAL PAIN

## 2021-07-22 ASSESSMENT — PAIN - FUNCTIONAL ASSESSMENT
PAIN_FUNCTIONAL_ASSESSMENT: ACTIVITIES ARE NOT PREVENTED
PAIN_FUNCTIONAL_ASSESSMENT: ACTIVITIES ARE NOT PREVENTED

## 2021-07-22 NOTE — LACTATION NOTE
Mom using good positioning and latch. Breastfeeding discharge reviewed. [facility] visit offered. Mom will call as needed.

## 2021-07-22 NOTE — PROGRESS NOTES
POST OPERATIVE DAY #  Jim Arguello is a 27 y.o. female   This patient was seen and examined today. Her pregnancy was complicated by:   Patient Active Problem List   Diagnosis    PCOS (polycystic ovarian syndrome)    Influenza vaccine needed    h/o  x1    Hypothyroidism    Pregnancy conceived with femara    Fetal cystic/echogenic kidney    Multiple fetal congenital anomalies    Need for Tdap vaccination    High-risk pregnancy in third trimester    39 weeks gestation of pregnancy    RLTCS 21 F Apg 8/9 Wt 8#1    Other immediate postpartum hemorrhage       Today she is doing well without any chief complaint. Her lochia is light. She denies chest pain, shortness of breath, headache, blurred vision and peripheral edema. She is breast feeding and she denies any signs or symptoms of mastitis. She is ambulating well. She is voiding without difficulty. She currently denies S/S of postpartum depression. Flatus present. Bowel movement absent. She is tolerating solids.     Vital Signs:  Vitals:    21 0430 21 0818 21 1600 21 2200   BP: (!) 92/51 (!) 100/55 (!) 100/56 (!) 98/54   Pulse: 72 72 78 80   Resp: 18 16 16 18   Temp: 98.6 °F (37 °C) 98.4 °F (36.9 °C) 98.1 °F (36.7 °C) 98.2 °F (36.8 °C)   TempSrc:       SpO2:  97% 99%    Weight:       Height:             Urine Input & Output last 24hrs:     Intake/Output Summary (Last 24 hours) at 2021 0630  Last data filed at 2021 8514  Gross per 24 hour   Intake --   Output 350 ml   Net -350 ml       Physical Exam:  General:  no apparent distress, alert and cooperative  Neurologic:  alert, oriented, normal speech, no focal findings or movement disorder noted  Lungs:  No increased work of breathing, good air exchange  Heart:  regular rate and rhythm    Abdomen: abdomen soft, non-distended, non-tender  Fundus: non-tender, normal size, firm, below umbilicus  Incision: Silver dressing in place with minimal serosanguineous drainage  Extremities:  no calf tenderness, non edematous    Labs:  Lab Results   Component Value Date    WBC 10.9 07/21/2021    HGB 9.8 (L) 07/21/2021    HCT 31.7 (L) 07/21/2021    MCV 95.5 07/21/2021     07/21/2021       Assessment/Plan:  1. Pedro Ramirez is a [id number] POD # 2 s/p RLTCS   - Doing well, requesting discharge home, VSS    - female infant in [address]   - Encourage ambulation and use of incentive spirometer  2. Rh positive/Rubella immune  3. Breast feeding    - Denies s/s of mastitis  4. Anemia   - Hgb 9.8 on POD#1   - Clinically asymptomatic   - Iron on d/c  5. Hypothyroidism   - Synthroid 50 mcg QD   - Clinically asymptomatic   - TSH 1.25, T4 1.14 on 7/14/21   - Encourage close follow up PP  6. BMI 30   - SCDs for DVT prophylaxis   7. Continue post-op care. 8. Desires discharge to home. Counseling Completed:  Secondary Smoke risks and Sudden Infant Death Syndrome were reviewed with recommendations. Infant sleeping, \"back to sleep\" and avoidance of co-sleeping recommendations were reviewed. Signs and Symptoms of Post Partum Depression were reviewed. The patient is to call if any occur. Signs and symptoms of Mastitis were reviewed. The patient is to call if any occur for follow up. Discharge instructions including pelvic rest, incision care, 15 lb weight restriction, no driving with pain medicine and office follow-up were reviewed with patient     Attending Physician: Dr. Noemi Farias,   Ob/Gyn Resident  7/22/2021, 6:30 AM         Attending Physician Statement  I have discussed the care of Pedro Ramirez, including pertinent history and exam findings,  with the resident. I have reviewed the key elements of all parts of the encounter with the resident. Patient seen and evaluated. Doing well POD#2 s/p RLTCS. No complaints and desires discharge to home. VSS, afebrile. Fundus firm below umbilicus. Silver dressing in place.  Okay for discharge to home today. Discharge instructions reviewed and all questions answered. RTO in one week. I agree with the assessment, plan and orders as documented by the resident.   (GE Modifier)    Electronically signed by Broderick Siegel So, DO on 7/22/2021 at 7:02 AM

## 2021-07-22 NOTE — PLAN OF CARE
Problem: Preoperative Routine:  Goal: Will comply with preparation for surgery or procedure  Description: Will comply with preparation for surgery or procedure  7/22/2021 1235 by Kandace Long RN  Outcome: Completed  7/22/2021 0906 by Kandace Long RN  Outcome: Ongoing     Problem: Pain:  Goal: Pain level will decrease  Description: Pain level will decrease  7/22/2021 1235 by Kandace Long RN  Outcome: Completed  7/22/2021 0906 by Kandace Long RN  Outcome: Ongoing  Goal: Control of acute pain  Description: Control of acute pain  7/22/2021 1235 by Kandace Long RN  Outcome: Completed  7/22/2021 0906 by Kandace Long RN  Outcome: Ongoing  Goal: Control of chronic pain  Description: Control of chronic pain  7/22/2021 1235 by Kandace Long RN  Outcome: Completed  7/22/2021 0906 by Kandace Long RN  Outcome: Ongoing     Problem: Discharge Planning:  Goal: Discharged to appropriate level of care  Description: Discharged to appropriate level of care  7/22/2021 1235 by Kandace Long RN  Outcome: Completed  7/22/2021 0906 by Kandace Long RN  Outcome: Ongoing     Problem: Fluid Volume - Imbalance:  Goal: Absence of postpartum hemorrhage signs and symptoms  Description: Absence of postpartum hemorrhage signs and symptoms  7/22/2021 1235 by Kandace Long RN  Outcome: Completed  7/22/2021 0906 by Kandace Long RN  Outcome: Ongoing  Goal: Absence of imbalanced fluid volume signs and symptoms  Description: Absence of imbalanced fluid volume signs and symptoms  7/22/2021 1235 by Kandace Long RN  Outcome: Completed  7/22/2021 0906 by Kandace Long RN  Outcome: Ongoing     Problem: Infection - Surgical Site:  Goal: Will show no infection signs and symptoms  Description: Will show no infection signs and symptoms  7/22/2021 1235 by Kandace Long RN  Outcome: Completed  7/22/2021 0906 by Kandace Long RN  Outcome: Ongoing     Problem: Mood - Altered:  Goal: Mood stable  Description: Mood stable  7/22/2021 1235 by Marycarmen Rucker RN  Outcome: Completed  7/22/2021 0906 by Marycarmen Rucker RN  Outcome: Ongoing     Problem: Nausea/Vomiting:  Goal: Absence of nausea/vomiting  Description: Absence of nausea/vomiting  7/22/2021 1235 by Marycarmen Rucker RN  Outcome: Completed  7/22/2021 0906 by Marycarmen Rucker RN  Outcome: Ongoing     Problem: Pain - Acute:  Goal: Pain level will decrease  Description: Pain level will decrease  7/22/2021 1235 by Marycarmen Rucker RN  Outcome: Completed  7/22/2021 0906 by Marycarmen Rucker RN  Outcome: Ongoing     Problem: Urinary Retention:  Goal: Urinary elimination within specified parameters  Description: Urinary elimination within specified parameters  7/22/2021 1235 by Marycarmen Rucker RN  Outcome: Completed  7/22/2021 0906 by Marycarmen Rucker RN  Outcome: Ongoing     Problem: Venous Thromboembolism:  Goal: Will show no signs or symptoms of venous thromboembolism  Description: Will show no signs or symptoms of venous thromboembolism  7/22/2021 1235 by Marycarmen Rucker RN  Outcome: Completed  7/22/2021 0906 by Marycarmen Rucker RN  Outcome: Ongoing  Goal: Absence of signs or symptoms of impaired coagulation  Description: Absence of signs or symptoms of impaired coagulation  7/22/2021 1235 by Marycarmen Rucker RN  Outcome: Completed  7/22/2021 0906 by Marycarmen Rucker RN  Outcome: Ongoing

## 2021-07-22 NOTE — PLAN OF CARE
Problem: Preoperative Routine:  Goal: Will comply with preparation for surgery or procedure  Description: Will comply with preparation for surgery or procedure  Outcome: Ongoing     Problem: Pain:  Goal: Pain level will decrease  Description: Pain level will decrease  Outcome: Ongoing  Goal: Control of acute pain  Description: Control of acute pain  Outcome: Ongoing  Goal: Control of chronic pain  Description: Control of chronic pain  Outcome: Ongoing     Problem: Discharge Planning:  Goal: Discharged to appropriate level of care  Description: Discharged to appropriate level of care  Outcome: Ongoing     Problem: Fluid Volume - Imbalance:  Goal: Absence of postpartum hemorrhage signs and symptoms  Description: Absence of postpartum hemorrhage signs and symptoms  Outcome: Ongoing  Goal: Absence of imbalanced fluid volume signs and symptoms  Description: Absence of imbalanced fluid volume signs and symptoms  Outcome: Ongoing     Problem: Infection - Surgical Site:  Goal: Will show no infection signs and symptoms  Description: Will show no infection signs and symptoms  Outcome: Ongoing     Problem: Mood - Altered:  Goal: Mood stable  Description: Mood stable  Outcome: Ongoing     Problem: Nausea/Vomiting:  Goal: Absence of nausea/vomiting  Description: Absence of nausea/vomiting  Outcome: Ongoing     Problem: Pain - Acute:  Goal: Pain level will decrease  Description: Pain level will decrease  Outcome: Ongoing     Problem: Urinary Retention:  Goal: Urinary elimination within specified parameters  Description: Urinary elimination within specified parameters  Outcome: Ongoing     Problem: Venous Thromboembolism:  Goal: Will show no signs or symptoms of venous thromboembolism  Description: Will show no signs or symptoms of venous thromboembolism  Outcome: Ongoing  Goal: Absence of signs or symptoms of impaired coagulation  Description: Absence of signs or symptoms of impaired coagulation  Outcome: Ongoing

## 2021-07-22 NOTE — PROGRESS NOTES
CLINICAL PHARMACY NOTE: MEDS TO BEDS    Total # of Prescriptions Filled: 5   The following medications were delivered to the patient:  · Levothyroxine 50mcg  · Iron 325mg  · Norco 5-325mg  · Ibuprofen 800mg  · Colace 100mg    Additional Documentation: delivered to patient in room 742 7/22 at 10:53am, co-pay paid with credit on [employer] ($22.85).

## 2021-07-28 ENCOUNTER — POSTPARTUM VISIT (OUTPATIENT)
Dept: OBGYN CLINIC | Age: 31
End: 2021-07-28

## 2021-07-28 VITALS
HEIGHT: 62 IN | SYSTOLIC BLOOD PRESSURE: 107 MMHG | HEART RATE: 61 BPM | BODY MASS INDEX: 27.97 KG/M2 | WEIGHT: 152 LBS | DIASTOLIC BLOOD PRESSURE: 70 MMHG

## 2021-07-28 DIAGNOSIS — E03.9 HYPOTHYROIDISM, UNSPECIFIED TYPE: ICD-10-CM

## 2021-07-28 DIAGNOSIS — O09.90 HIGH RISK PREGNANCY, ANTEPARTUM: ICD-10-CM

## 2021-07-28 DIAGNOSIS — E28.2 PCOS (POLYCYSTIC OVARIAN SYNDROME): ICD-10-CM

## 2021-07-28 DIAGNOSIS — O35.9XX0 PREGNANCY AFFECTED BY MULTIPLE CONGENITAL ANOMALIES OF FETUS, SINGLE OR UNSPECIFIED FETUS: ICD-10-CM

## 2021-07-28 PROCEDURE — 99024 POSTOP FOLLOW-UP VISIT: CPT | Performed by: OBSTETRICS & GYNECOLOGY

## 2021-07-28 NOTE — PROGRESS NOTES
Postpartum Visit    Lizzie Alexander is a 27 y.o. female , 1 week Post Partum s/p RLTCS with LISSET on 21    The patient was seen. Her pregnancy was complicated by h/o  x1, PCOS (conceived with femara), hypothyroidism, and multiple congenital anomalies. Delivery was complicated by pelvic adhesive disease and intraoperative hemorrhage (QBL 1050ml). She has no chief complaint today. She denies headache, vision changes, and RUQ pain. She denies any fevers/chills, SOB, cough, sore throat, myalgias, n/v, loss of taste/smell or sick contacts. She is breast feeding and denies signs or symptoms of mastitis. Reports that she has also been pumping since her baby has a tongue tie. The patient completed the E.P.D.S. Post Partum Depression Evaluation form and scored 0. She does not have signs or symptoms of post partum depression. She denies any suicidal thoughts with a plan, intent to harm others and delusional ideas. She does admit to having good home support. Today her lochia is light. She denies any dizziness or shortness of breath. Her bowels are regular and she denies any urinary tract symptomology. OB History    Para Term  AB Living   2 2 2 0 0 2   SAB TAB Ectopic Molar Multiple Live Births   0 0 0 0 0 2     Patient Active Problem List   Diagnosis    PCOS (polycystic ovarian syndrome)    Influenza vaccine needed    h/o  x1    Hypothyroidism    Pregnancy conceived with femara    Fetal cystic/echogenic kidney    Multiple fetal congenital anomalies    Need for Tdap vaccination    High-risk pregnancy in third trimester    39 weeks gestation of pregnancy    RLTCS 21 F Apg 8/9 Wt 8#1    Other immediate postpartum hemorrhage       Vitals:   Blood pressure 107/70, pulse 61, height 5' 2\" (1.575 m), weight 152 lb (68.9 kg), last menstrual period 10/16/2020, currently breastfeeding.     Physical Exam:  General:  no apparent distress, alert and cooperative  Lungs:  No increased work of breathing, good air exchange, clear to auscultation bilaterally, no crackles or wheezing  Heart:  regular rate and rhythm and no murmur    Abdomen: Abdomen soft, non-tender. BS normal. No masses,  No organomegaly  Fundus: non-tender, normal size, firm, below umbilicus  Incision: silver dressing removed without difficulty; clean/dry/intact  Extremities:  no calf tenderness, non edematous    Assessment:  Hallie Kendall is a 27 y.o. female , 1 week Post Partum s/p RLTCS and LISSET on 21   - Stable, continue routine post partum care   - Reviewed appropriate postop wound care      Patient Active Problem List    Diagnosis Date Noted    RLTCS 21 F Apg 8/9 Wt 8#1 2021     Priority: High    h/o  x1 2016     Priority: Medium     Arrest of dilation and suspected OP presentation, LTCS   calculator 51.9%  Desires TOLAC      PCOS (polycystic ovarian syndrome) 2014     Priority: Medium     Early 1h , 3h GTT wnl      Influenza vaccine needed 2016     Priority: Low     Given 20      Other immediate postpartum hemorrhage 2021     QBL 1050mL intraoperatively      High-risk pregnancy in third trimester 2021    39 weeks gestation of pregnancy     Need for Tdap vaccination 2021     Given 21      Multiple fetal congenital anomalies 2021     Left cystic/echogenic kidney, fluid-filled rectum, and echogenic/dilated fetal bowel. Following with MFM.  testing recommended.   Referred to [facility] , fetal MRI with duplicated left renal collecting system, likely ectopic left ureter inserting into the vagina or a left hemivagina, normal appearance of fetal colon, rectum and brain  Peds urology - okay to deliver at , plan for  antibiotics and a follow up ultrasound around 7-10 days of life      Fetal cystic/echogenic kidney 2021     Following with MFM  Fetal echo ordered        Pregnancy conceived with femara 12/07/2020    Hypothyroidism      Will trend thyroid studies q 4 weeks       Return in about 2 weeks (around 8/11/2021) for postpartum. Counseling Completed:  · Signs & Symptoms of mastitis and when to notify office discussed.   · Secondary smoke risks including increased risks of respiratory problems, Sudden infant death syndrome, and potential malignancies discussed  · Abstinence, family planning counseling and STD counseling discussed  · Continue with postpartum restrictions until 6 weeks post partum      DO [facility] Levora Music  7/28/2021 2:47 PM

## 2021-08-10 NOTE — PROGRESS NOTES
Postpartum Visit    Fede Owens is a 27 y.o. female , 3 weeks Post Partum s/p RLTCS with LISSET on 21    The patient was seen. Her pregnancy was complicated by h/o  x1, PCOS (conceived with femara), hypothyroidism, and multiple congenital anomalies. Delivery was complicated by pelvic adhesive disease and intraoperative hemorrhage (QBL 1050ml). She has no chief complaint today. She denies headache, vision changes, and RUQ pain. She denies any fevers/chills, SOB, cough, sore throat, myalgias, n/v, loss of taste/smell or sick contacts. She is breast feeding and denies signs or symptoms of mastitis. She does not have signs or symptoms of post partum depression. She denies any suicidal thoughts with a plan, intent to harm others and delusional ideas. She does admit to having good home support. Today her lochia is light. She denies any dizziness or shortness of breath. Her bowels are regular and she denies any urinary tract symptomology. She is thinking about starting OCPs or depo for her h/o PCOS. OB History    Para Term  AB Living   2 2 2 0 0 2   SAB TAB Ectopic Molar Multiple Live Births   0 0 0 0 0 2     Patient Active Problem List   Diagnosis    PCOS (polycystic ovarian syndrome)    Influenza vaccine needed    h/o  x1    Hypothyroidism    Pregnancy conceived with femara    Fetal cystic/echogenic kidney    Multiple fetal congenital anomalies    Need for Tdap vaccination    High-risk pregnancy in third trimester    39 weeks gestation of pregnancy    RLTCS 21 F Apg 8/9 Wt 8#1    Other immediate postpartum hemorrhage       Vitals:   Blood pressure 110/63, pulse 70, height 5' 2\" (1.575 m), weight 148 lb 3.2 oz (67.2 kg), last menstrual period 10/16/2020, currently breastfeeding.     Physical Exam:  General:  no apparent distress, alert and cooperative  Lungs:  No increased work of breathing, good air exchange, clear to auscultation bilaterally, no crackles or wheezing  Heart:  regular rate and rhythm and no murmur    Abdomen: Abdomen soft, non-tender. BS normal. No masses,  No organomegaly  Fundus: non-tender, normal size, firm, below umbilicus  Incision: clean, dry and intact; well healed  Extremities:  no calf tenderness, non edematous    Assessment:  Ondina Art is a 27 y.o. female , 3 weeks Post Partum s/p RLTCS with LISSET on 21   - Stable, continue routine post partum care   - Reviewed medical management options for endometrial protection due to PCOS, including side effect profiles and dosing regimens. All questions answered. Patient desires to start OCPs at 6wks postpartum. Patient Active Problem List    Diagnosis Date Noted    RLTCS 21 F Apg 8/9 Wt 8#1 2021     Priority: High    h/o  x1 2016     Priority: Medium     Arrest of dilation and suspected OP presentation, LTCS   calculator 51.9%  Desires TOLAC      PCOS (polycystic ovarian syndrome) 2014     Priority: Medium     Early 1h , 3h GTT wnl      Influenza vaccine needed 2016     Priority: Low     Given 20      Other immediate postpartum hemorrhage 2021     QBL 1050mL intraoperatively      High-risk pregnancy in third trimester 2021    39 weeks gestation of pregnancy     Need for Tdap vaccination 2021     Given 21      Multiple fetal congenital anomalies 2021     Left cystic/echogenic kidney, fluid-filled rectum, and echogenic/dilated fetal bowel. Following with M.  testing recommended.   Referred to [facility] - appt , fetal MRI with duplicated left renal collecting system, likely ectopic left ureter inserting into the vagina or a left hemivagina, normal appearance of fetal colon, rectum and brain  Peds urology - okay to deliver at , plan for  antibiotics and a follow up ultrasound around 7-10 days of life      Fetal cystic/echogenic kidney 2021 Following with MFM  Fetal echo ordered        Pregnancy conceived with femara 12/07/2020    Hypothyroidism      Will trend thyroid studies q 4 weeks       Return in about 3 weeks (around 9/1/2021) for 6wk postpartum. Counseling Completed:  · Signs & Symptoms of mastitis and when to notify office discussed.   · Secondary smoke risks including increased risks of respiratory problems, Sudden infant death syndrome, and potential malignancies discussed  · Abstinence, family planning counseling and STD counseling discussed  · Continue with postpartum restrictions until 6 weeks post partum      DO [facility]  8/11/2021 1:50 PM

## 2021-08-11 ENCOUNTER — POSTPARTUM VISIT (OUTPATIENT)
Dept: OBGYN CLINIC | Age: 31
End: 2021-08-11

## 2021-08-11 VITALS
HEIGHT: 62 IN | WEIGHT: 148.2 LBS | SYSTOLIC BLOOD PRESSURE: 110 MMHG | DIASTOLIC BLOOD PRESSURE: 63 MMHG | HEART RATE: 70 BPM | BODY MASS INDEX: 27.27 KG/M2

## 2021-08-11 DIAGNOSIS — O35.9XX0 PREGNANCY AFFECTED BY MULTIPLE CONGENITAL ANOMALIES OF FETUS, SINGLE OR UNSPECIFIED FETUS: ICD-10-CM

## 2021-08-11 DIAGNOSIS — E28.2 PCOS (POLYCYSTIC OVARIAN SYNDROME): ICD-10-CM

## 2021-08-11 DIAGNOSIS — E03.9 HYPOTHYROIDISM, UNSPECIFIED TYPE: ICD-10-CM

## 2021-08-11 DIAGNOSIS — Z98.891 HISTORY OF CESAREAN SECTION: ICD-10-CM

## 2021-08-11 DIAGNOSIS — O09.90 HIGH RISK PREGNANCY, ANTEPARTUM: ICD-10-CM

## 2021-08-11 PROCEDURE — 0503F POSTPARTUM CARE VISIT: CPT | Performed by: OBSTETRICS & GYNECOLOGY

## 2022-04-10 NOTE — PROGRESS NOTES
[address] [facility] Renuka Hall  [address]  [address]  Dept: [phone]  Dept Fax: [phone]    22    Chief Complaint   Patient presents with    Follow-up     discuss not having a period in 8 months. Pt stated she has Reita Hands just wants to make sure she doesn't need to be doing anything        Zenovia Newer 27 y.o. has a complaint of amenorrhea for 9 months. She has a h/o PCOS and is 9 months postpartum. She is exclusively breast feeding. She did having some spotting a few weeks ago. She would like to breast feed until 1 year. Review of Systems   Constitutional: Negative for chills and fatigue. HENT: Negative for congestion. Respiratory: Negative for cough and shortness of breath. Cardiovascular: Negative for chest pain and palpitations. Gastrointestinal: Negative for abdominal pain. Genitourinary: Negative for dyspareunia, pelvic pain and vaginal discharge. Musculoskeletal: Negative for back pain. Neurological: Negative for dizziness and light-headedness. Psychiatric/Behavioral: The patient is not nervous/anxious. Gynecologic History  No LMP recorded.   Contraception: none  Last Pap: 20  Results: normal  Last Mammogram: n/a    Obstetric History  : 2  Para: 2  AB: 0    Past Medical History:   Diagnosis Date    Hypothyroidism     PCOS (polycystic ovarian syndrome)      Past Surgical History:   Procedure Laterality Date     SECTION  2016     SECTION N/A 2021     SECTION performed by Amelia Caraballo So, DO at [facility]     No Known Allergies  Current Outpatient Medications   Medication Sig Dispense Refill    levothyroxine (SYNTHROID) 50 MCG tablet Take 1 tablet by mouth daily 30 tablet 0    ferrous sulfate (FE TABS) 325 (65 Fe) MG EC tablet Take 1 tablet by mouth 2 times daily 60 tablet 3    ibuprofen (ADVIL;MOTRIN) 800 MG tablet Take 1 tablet by mouth every 8 hours as needed for Pain (Patient not taking: Reported on 8/11/2021) 120 tablet 3    famotidine (PEPCID) 20 MG tablet Take 1 tablet by mouth 2 times daily (Patient not taking: Reported on 7/1/2021) 60 tablet 3    Prenatal Multivit-Min-Fe-FA (PRENATAL VITAMINS PO) Take 1 tablet by mouth daily (Patient not taking: Reported on 7/28/2021)       No current facility-administered medications for this visit. Social History     Socioeconomic History    Marital status:      Spouse name: Not on file    Number of children: Not on file    Years of education: Not on file    Highest education level: Not on file   Occupational History    Not on file   Tobacco Use    Smoking status: Never Smoker    Smokeless tobacco: Never Used   Vaping Use    Vaping Use: Never used   Substance and Sexual Activity    Alcohol use: No     Alcohol/week: 0.0 standard drinks    Drug use: No    Sexual activity: Yes     Partners: Male   Other Topics Concern    Not on file   Social History Narrative    Not on file     Social Determinants of Health     Financial Resource Strain:     Difficulty of Paying Living Expenses: Not on file   Food Insecurity:     Worried About Running Out of Food in the Last Year: Not on file    Bethany of Food in the Last Year: Not on file   Transportation Needs:     Lack of Transportation (Medical): Not on file    Lack of Transportation (Non-Medical):  Not on file   Physical Activity:     Days of Exercise per Week: Not on file    Minutes of Exercise per Session: Not on file   Stress:     Feeling of Stress : Not on file   Social Connections:     Frequency of Communication with Friends and Family: Not on file    Frequency of Social Gatherings with Friends and Family: Not on file    Attends [employer] Services: Not on file    Active Member of Clubs or Organizations: Not on file    Attends Club or Organization Meetings: Not on file    Marital Status: Not on file   Intimate Partner Violence:     Fear of Current or Ex-Partner: Not on file    Emotionally Abused: Not on file    Physically Abused: Not on file    Sexually Abused: Not on file   Housing Stability:     Unable to Pay for Housing in the Last Year: Not on file    Number of [place] in the Last Year: Not on file    Unstable Housing in the Last Year: Not on file     Family History   Problem Relation Age of Onset    Diabetes Maternal Grandmother     Breast Cancer Neg Hx     Colon Cancer Neg Hx     Ovarian Cancer Neg Hx     Uterine Cancer Neg Hx        Physical exam  Physical Exam  Constitutional:       Appearance: Normal appearance. She is normal weight. HENT:      Head: Normocephalic. Eyes:      Extraocular Movements: Extraocular movements intact. Pulmonary:      Effort: Pulmonary effort is normal.   Neurological:      Mental Status: She is alert and oriented to person, place, and time. Psychiatric:         Mood and Affect: Mood normal.         Behavior: Behavior normal.         Thought Content: Thought content normal.         Judgment: Judgment normal.         Assessment/Plan  1. Secondary amenorrhea  - Likely 2/2 breast feeding and her h/o PCOS  - Discussed that when she stops breast feeding her period should restart, and if not will trigger with provera    2. Breast feeding status of mother  - Pt planning on breast feeding until 1 year    3. PCOS (polycystic ovarian syndrome)  - Pt to call when she stops breast feeding.    - Will then restart metformin and provera. - Will use femara when wanting to conceive.      Pt to follow up KAYLIE Nesbitt MD  [address]

## 2022-04-11 ENCOUNTER — OFFICE VISIT (OUTPATIENT)
Dept: OBGYN CLINIC | Age: 32
End: 2022-04-11
Payer: COMMERCIAL

## 2022-04-11 VITALS
HEIGHT: 62 IN | SYSTOLIC BLOOD PRESSURE: 97 MMHG | BODY MASS INDEX: 22.63 KG/M2 | WEIGHT: 123 LBS | DIASTOLIC BLOOD PRESSURE: 57 MMHG | HEART RATE: 79 BPM

## 2022-04-11 DIAGNOSIS — E28.2 PCOS (POLYCYSTIC OVARIAN SYNDROME): ICD-10-CM

## 2022-04-11 DIAGNOSIS — N91.1 SECONDARY AMENORRHEA: Primary | ICD-10-CM

## 2022-04-11 PROCEDURE — 99213 OFFICE O/P EST LOW 20 MIN: CPT | Performed by: OBSTETRICS & GYNECOLOGY

## 2022-04-11 ASSESSMENT — ENCOUNTER SYMPTOMS
BACK PAIN: 0
COUGH: 0
ABDOMINAL PAIN: 0
SHORTNESS OF BREATH: 0

## 2022-05-20 ENCOUNTER — HOSPITAL ENCOUNTER (EMERGENCY)
Age: 32
Discharge: HOME | End: 2022-05-20
Attending: EMERGENCY MEDICINE
Payer: COMMERCIAL

## 2022-05-20 VITALS
WEIGHT: 122 LBS | BODY MASS INDEX: 22.31 KG/M2 | HEART RATE: 69 BPM | SYSTOLIC BLOOD PRESSURE: 106 MMHG | OXYGEN SATURATION: 100 % | RESPIRATION RATE: 15 BRPM | TEMPERATURE: 97.5 F | DIASTOLIC BLOOD PRESSURE: 94 MMHG

## 2022-05-20 DIAGNOSIS — B00.9 HERPES SIMPLEX: Primary | ICD-10-CM

## 2022-05-20 LAB
CHP ED QC CHECK: NORMAL
PREGNANCY TEST URINE, POC: NEGATIVE

## 2022-05-20 PROCEDURE — 6370000000 HC RX 637 (ALT 250 FOR IP): Performed by: STUDENT IN AN ORGANIZED HEALTH CARE EDUCATION/TRAINING PROGRAM

## 2022-05-20 PROCEDURE — 99283 EMERGENCY DEPT VISIT LOW MDM: CPT

## 2022-05-20 RX ORDER — DOXYCYCLINE HYCLATE 100 MG
100 TABLET ORAL ONCE
Status: COMPLETED | OUTPATIENT
Start: 2022-05-20 | End: 2022-05-20

## 2022-05-20 RX ORDER — DOXYCYCLINE HYCLATE 100 MG
100 TABLET ORAL ONCE
Status: DISCONTINUED | OUTPATIENT
Start: 2022-05-20 | End: 2022-05-20

## 2022-05-20 RX ORDER — DOXYCYCLINE HYCLATE 100 MG
100 TABLET ORAL 2 TIMES DAILY
Qty: 14 TABLET | Refills: 0 | Status: SHIPPED | OUTPATIENT
Start: 2022-05-20 | End: 2022-05-27

## 2022-05-20 RX ORDER — ACYCLOVIR 200 MG/1
200 CAPSULE ORAL ONCE
Status: COMPLETED | OUTPATIENT
Start: 2022-05-20 | End: 2022-05-20

## 2022-05-20 RX ORDER — MUPIROCIN CALCIUM 20 MG/G
CREAM TOPICAL
Qty: 15 G | Refills: 0 | Status: SHIPPED | OUTPATIENT
Start: 2022-05-20 | End: 2022-06-19

## 2022-05-20 RX ORDER — ACYCLOVIR 200 MG/1
200 CAPSULE ORAL 3 TIMES DAILY
Qty: 30 CAPSULE | Refills: 0 | Status: SHIPPED | OUTPATIENT
Start: 2022-05-20 | End: 2022-05-30

## 2022-05-20 RX ADMIN — ACYCLOVIR 200 MG: 200 CAPSULE ORAL at 17:05

## 2022-05-20 RX ADMIN — DOXYCYCLINE HYCLATE 100 MG: 100 TABLET, COATED ORAL at 17:05

## 2022-05-20 ASSESSMENT — ENCOUNTER SYMPTOMS
COUGH: 0
SORE THROAT: 0
SHORTNESS OF BREATH: 0
BACK PAIN: 0
VOMITING: 0
ABDOMINAL PAIN: 0
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
## 2022-10-28 ENCOUNTER — HOSPITAL ENCOUNTER (EMERGENCY)
Age: 32
Discharge: HOME | End: 2022-10-28
Attending: EMERGENCY MEDICINE
Payer: COMMERCIAL

## 2022-10-28 VITALS
SYSTOLIC BLOOD PRESSURE: 124 MMHG | OXYGEN SATURATION: 98 % | HEART RATE: 95 BPM | DIASTOLIC BLOOD PRESSURE: 76 MMHG | TEMPERATURE: 98.4 F | RESPIRATION RATE: 16 BRPM

## 2022-10-28 DIAGNOSIS — T30.0 BURN: Primary | ICD-10-CM

## 2022-10-28 DIAGNOSIS — Z53.20 LEFT BEFORE TREATMENT COMPLETED: ICD-10-CM

## 2022-10-28 PROCEDURE — 99281 EMR DPT VST MAYX REQ PHY/QHP: CPT

## 2022-10-28 ASSESSMENT — ENCOUNTER SYMPTOMS
VOICE CHANGE: 0
SINUS PAIN: 0
SINUS PRESSURE: 0
NAUSEA: 0
VOMITING: 0
CHEST TIGHTNESS: 0
ABDOMINAL PAIN: 0
BACK PAIN: 0
COLOR CHANGE: 1
COUGH: 0
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
## 2022-10-28 NOTE — ED NOTES
Writer is extended triage nurse. Assessment and treatment for this patient was primarily performed by resident and attending with extended triage nurse performing tasks as directed. Pt seen primarily by resident and attending physicians. RN assessment deferred to physician assessment.        Nichole Gowers, RN  10/28/22 2033 no
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
## 2022-10-30 ENCOUNTER — HOSPITAL ENCOUNTER (INPATIENT)
Age: 32
LOS: 9 days | Discharge: HOME | DRG: 885 | End: 2022-11-08
Attending: EMERGENCY MEDICINE | Admitting: PSYCHIATRY & NEUROLOGY
Payer: COMMERCIAL

## 2022-10-30 DIAGNOSIS — R45.851 DEPRESSION WITH SUICIDAL IDEATION: Primary | ICD-10-CM

## 2022-10-30 DIAGNOSIS — F32.A DEPRESSION WITH SUICIDAL IDEATION: Primary | ICD-10-CM

## 2022-10-30 PROBLEM — F23 ACUTE PSYCHOSIS (HCC): Status: ACTIVE | Noted: 2022-10-30

## 2022-10-30 LAB
ABSOLUTE EOS #: 0 K/UL (ref 0–0.4)
ABSOLUTE LYMPH #: 1.2 K/UL (ref 1–4.8)
ABSOLUTE MONO #: 0.4 K/UL (ref 0.1–1.3)
ALBUMIN SERPL-MCNC: 5.2 G/DL (ref 3.5–5.2)
ALP BLD-CCNC: 99 U/L (ref 35–104)
ALT SERPL-CCNC: 9 U/L (ref 5–33)
ANION GAP SERPL CALCULATED.3IONS-SCNC: 17 MMOL/L (ref 9–17)
AST SERPL-CCNC: 14 U/L
BASOPHILS # BLD: 0 % (ref 0–2)
BASOPHILS ABSOLUTE: 0 K/UL (ref 0–0.2)
BILIRUB SERPL-MCNC: 0.5 MG/DL (ref 0.3–1.2)
BUN BLDV-MCNC: 13 MG/DL (ref 6–20)
CALCIUM SERPL-MCNC: 9.5 MG/DL (ref 8.6–10.4)
CHLORIDE BLD-SCNC: 102 MMOL/L (ref 98–107)
CO2: 21 MMOL/L (ref 20–31)
CREAT SERPL-MCNC: 0.59 MG/DL (ref 0.5–0.9)
EOSINOPHILS RELATIVE PERCENT: 0 % (ref 0–4)
ETHANOL PERCENT: <0.01 %
ETHANOL: <10 MG/DL
GFR SERPL CREATININE-BSD FRML MDRD: >60 ML/MIN/1.73M2
GLUCOSE BLD-MCNC: 104 MG/DL (ref 70–99)
HCG QUALITATIVE: NEGATIVE
HCT VFR BLD CALC: 45.8 % (ref 36–46)
HEMOGLOBIN: 15 G/DL (ref 12–16)
LYMPHOCYTES # BLD: 19 % (ref 24–44)
MCH RBC QN AUTO: 29.2 PG (ref 26–34)
MCHC RBC AUTO-ENTMCNC: 32.8 G/DL (ref 31–37)
MCV RBC AUTO: 89 FL (ref 80–100)
MONOCYTES # BLD: 7 % (ref 1–7)
PDW BLD-RTO: 13.6 % (ref 11.5–14.9)
PLATELET # BLD: 297 K/UL (ref 150–450)
PMV BLD AUTO: 8.7 FL (ref 6–12)
POTASSIUM SERPL-SCNC: 3.9 MMOL/L (ref 3.7–5.3)
RBC # BLD: 5.14 M/UL (ref 4–5.2)
SEG NEUTROPHILS: 74 % (ref 36–66)
SEGMENTED NEUTROPHILS ABSOLUTE COUNT: 4.6 K/UL (ref 1.3–9.1)
SODIUM BLD-SCNC: 140 MMOL/L (ref 135–144)
TOTAL PROTEIN: 8.2 G/DL (ref 6.4–8.3)
WBC # BLD: 6.3 K/UL (ref 3.5–11)

## 2022-10-30 PROCEDURE — 6370000000 HC RX 637 (ALT 250 FOR IP): Performed by: EMERGENCY MEDICINE

## 2022-10-30 PROCEDURE — 84703 CHORIONIC GONADOTROPIN ASSAY: CPT

## 2022-10-30 PROCEDURE — 85025 COMPLETE CBC W/AUTO DIFF WBC: CPT

## 2022-10-30 PROCEDURE — 1240000000 HC EMOTIONAL WELLNESS R&B

## 2022-10-30 PROCEDURE — 36415 COLL VENOUS BLD VENIPUNCTURE: CPT

## 2022-10-30 PROCEDURE — 80053 COMPREHEN METABOLIC PANEL: CPT

## 2022-10-30 PROCEDURE — 99285 EMERGENCY DEPT VISIT HI MDM: CPT

## 2022-10-30 PROCEDURE — G0480 DRUG TEST DEF 1-7 CLASSES: HCPCS

## 2022-10-30 RX ORDER — DIPHENHYDRAMINE HYDROCHLORIDE 50 MG/ML
50 INJECTION INTRAMUSCULAR; INTRAVENOUS EVERY 6 HOURS PRN
Status: DISCONTINUED | OUTPATIENT
Start: 2022-10-30 | End: 2022-11-08 | Stop reason: HOSPADM

## 2022-10-30 RX ORDER — HYDROXYZINE 50 MG/1
50 TABLET, FILM COATED ORAL 3 TIMES DAILY PRN
Status: DISCONTINUED | OUTPATIENT
Start: 2022-10-30 | End: 2022-11-08 | Stop reason: HOSPADM

## 2022-10-30 RX ORDER — LORAZEPAM 1 MG/1
1 TABLET ORAL ONCE
Status: COMPLETED | OUTPATIENT
Start: 2022-10-30 | End: 2022-10-30

## 2022-10-30 RX ORDER — HALOPERIDOL 5 MG/ML
5 INJECTION INTRAMUSCULAR EVERY 6 HOURS PRN
Status: DISCONTINUED | OUTPATIENT
Start: 2022-10-30 | End: 2022-11-08 | Stop reason: HOSPADM

## 2022-10-30 RX ORDER — LEVOTHYROXINE SODIUM 0.05 MG/1
50 TABLET ORAL DAILY
Status: DISCONTINUED | OUTPATIENT
Start: 2022-10-31 | End: 2022-11-08 | Stop reason: HOSPADM

## 2022-10-30 RX ORDER — ACETAMINOPHEN 325 MG/1
650 TABLET ORAL EVERY 6 HOURS PRN
Status: DISCONTINUED | OUTPATIENT
Start: 2022-10-30 | End: 2022-11-08 | Stop reason: HOSPADM

## 2022-10-30 RX ORDER — FERROUS SULFATE 325(65) MG
325 TABLET ORAL 2 TIMES DAILY
Status: DISCONTINUED | OUTPATIENT
Start: 2022-10-30 | End: 2022-11-08 | Stop reason: HOSPADM

## 2022-10-30 RX ORDER — TRAZODONE HYDROCHLORIDE 50 MG/1
50 TABLET ORAL NIGHTLY PRN
Status: DISCONTINUED | OUTPATIENT
Start: 2022-10-30 | End: 2022-11-08 | Stop reason: HOSPADM

## 2022-10-30 RX ORDER — LORAZEPAM 2 MG/ML
2 INJECTION INTRAMUSCULAR EVERY 6 HOURS PRN
Status: DISCONTINUED | OUTPATIENT
Start: 2022-10-30 | End: 2022-11-08 | Stop reason: HOSPADM

## 2022-10-30 RX ORDER — MAGNESIUM HYDROXIDE/ALUMINUM HYDROXICE/SIMETHICONE 120; 1200; 1200 MG/30ML; MG/30ML; MG/30ML
30 SUSPENSION ORAL EVERY 6 HOURS PRN
Status: DISCONTINUED | OUTPATIENT
Start: 2022-10-30 | End: 2022-11-08 | Stop reason: HOSPADM

## 2022-10-30 RX ORDER — OLANZAPINE 10 MG/1
5 TABLET, ORALLY DISINTEGRATING ORAL NIGHTLY
Status: DISCONTINUED | OUTPATIENT
Start: 2022-10-30 | End: 2022-11-08 | Stop reason: HOSPADM

## 2022-10-30 RX ORDER — IBUPROFEN 400 MG/1
400 TABLET ORAL EVERY 6 HOURS PRN
Status: DISCONTINUED | OUTPATIENT
Start: 2022-10-30 | End: 2022-11-08 | Stop reason: HOSPADM

## 2022-10-30 RX ORDER — POLYETHYLENE GLYCOL 3350 17 G/17G
17 POWDER, FOR SOLUTION ORAL DAILY PRN
Status: DISCONTINUED | OUTPATIENT
Start: 2022-10-30 | End: 2022-11-08 | Stop reason: HOSPADM

## 2022-10-30 RX ADMIN — LORAZEPAM 1 MG: 1 TABLET ORAL at 16:36

## 2022-10-30 ASSESSMENT — PATIENT HEALTH QUESTIONNAIRE - PHQ9
SUM OF ALL RESPONSES TO PHQ QUESTIONS 1-9: 18
SUM OF ALL RESPONSES TO PHQ QUESTIONS 1-9: 18

## 2022-10-30 ASSESSMENT — LIFESTYLE VARIABLES
HOW MANY STANDARD DRINKS CONTAINING ALCOHOL DO YOU HAVE ON A TYPICAL DAY: 3 OR 4
HOW OFTEN DO YOU HAVE A DRINK CONTAINING ALCOHOL: NEVER
HOW MANY STANDARD DRINKS CONTAINING ALCOHOL DO YOU HAVE ON A TYPICAL DAY: PATIENT DOES NOT DRINK
HOW MANY STANDARD DRINKS CONTAINING ALCOHOL DO YOU HAVE ON A TYPICAL DAY: PATIENT DOES NOT DRINK
HOW OFTEN DO YOU HAVE A DRINK CONTAINING ALCOHOL: 4 OR MORE TIMES A WEEK
HOW OFTEN DO YOU HAVE A DRINK CONTAINING ALCOHOL: NEVER

## 2022-10-30 ASSESSMENT — SLEEP AND FATIGUE QUESTIONNAIRES
DO YOU HAVE DIFFICULTY SLEEPING: NO
AVERAGE NUMBER OF SLEEP HOURS: 10
DO YOU USE A SLEEP AID: NO

## 2022-10-30 NOTE — ED NOTES
Pt Pleasant and cooperative voices suicidal thoughts softly repeating \"I want to die\". Currently sitting in chair next to nurses station. Safeguard in [facility] AFFIL[facility]ATE OF [facility] for patient watch. Safeguard informed that they need to stay with the patient at all time, must be present in the room and if they need a break or relief to let the nurse know so they can be replaced. Safeguard verbalizes understanding. Belongings and patient checked by security. Belongings locked up. Pt in blue gown.       Maria Elena Jamison LPN  19/42/54 5600
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
## 2022-10-31 PROBLEM — F33.3 MAJOR DEPRESSIVE DISORDER, RECURRENT, SEVERE WITH PSYCHOTIC FEATURES (HCC): Status: ACTIVE | Noted: 2022-10-31

## 2022-10-31 PROBLEM — F25.1 SCHIZOAFFECTIVE DISORDER, DEPRESSIVE TYPE (HCC): Status: ACTIVE | Noted: 2022-10-31

## 2022-10-31 PROBLEM — F32.A DEPRESSION WITH SUICIDAL IDEATION: Status: ACTIVE | Noted: 2022-10-31

## 2022-10-31 PROBLEM — R45.851 DEPRESSION WITH SUICIDAL IDEATION: Status: ACTIVE | Noted: 2022-10-31

## 2022-10-31 LAB — TSH SERPL DL<=0.05 MIU/L-ACNC: 1.53 UIU/ML (ref 0.3–5)

## 2022-10-31 PROCEDURE — 36415 COLL VENOUS BLD VENIPUNCTURE: CPT

## 2022-10-31 PROCEDURE — 99223 1ST HOSP IP/OBS HIGH 75: CPT | Performed by: INTERNAL MEDICINE

## 2022-10-31 PROCEDURE — 6370000000 HC RX 637 (ALT 250 FOR IP): Performed by: PSYCHIATRY & NEUROLOGY

## 2022-10-31 PROCEDURE — 1240000000 HC EMOTIONAL WELLNESS R&B

## 2022-10-31 PROCEDURE — 84443 ASSAY THYROID STIM HORMONE: CPT

## 2022-10-31 PROCEDURE — APPSS60 APP SPLIT SHARED TIME 46-60 MINUTES

## 2022-10-31 RX ORDER — FLUOXETINE 10 MG/1
10 CAPSULE ORAL DAILY
Status: DISCONTINUED | OUTPATIENT
Start: 2022-10-31 | End: 2022-11-08 | Stop reason: HOSPADM

## 2022-10-31 RX ADMIN — HYDROXYZINE HYDROCHLORIDE 50 MG: 50 TABLET, FILM COATED ORAL at 11:05

## 2022-10-31 RX ADMIN — HYDROXYZINE HYDROCHLORIDE 50 MG: 50 TABLET, FILM COATED ORAL at 00:23

## 2022-10-31 NOTE — BH NOTE
Patient given tobacco quitline number 74312803097 at this time, refusing to call at this time, states \" I just dont want to quit now\"- patient given information as to the dangers of long term tobacco use. Continue to reinforce the importance of tobacco cessation.

## 2022-10-31 NOTE — BH NOTE
[address] [facility]  Admission Note     Admission Type:   Admission Type: Involuntary    Reason for admission:  Reason for Admission: Acute psychosis. Patient has been struggling with her mental health recently, beleving that she is going to die, weight loss of 15+ pounds, and suicidal ideation without a plan.       Addictive Behavior:   Addictive Behavior  In the Past 3 Months, Have You Felt or Has Someone Told You That You Have a Problem With  : None    Medical Problems:   Past Medical History:   Diagnosis Date    Hypothyroidism     PCOS (polycystic ovarian syndrome)        Status EXAM:  Mental Status and Behavioral Exam  Normal: No  Level of Assistance: Independent/Self  Facial Expression: Avoids Gaze, Expressionless, Flat  Affect: Blunt  Level of Consciousness: Alert  Frequency of Checks: 4 times per hour, close  Mood:Normal: No  Mood: Depressed, Anxious, Helpless, Sad, Worthless, low self-esteem  Motor Activity:Normal: Yes  Eye Contact: Fair  Observed Behavior: Cooperative, Guarded  Sexual Misconduct History: Current - no  Preception: [place] to person, [place] to time, [place] to place, [place] to situation  Attention:Normal: Yes  Thought Processes: Blocking  Thought Content:Normal: No  Thought Content: Delusions, Paranoia  Depression Symptoms: Feelings of helplessness, Feelings of hopelessess, Feelings of worthlessness, Impaired concentration  Anxiety Symptoms: Generalized  [place] Symptoms: No problems reported or observed. (patient denies)  Hallucinations: None  Delusions: Yes  Delusions: Paranoid, Persecutory  Memory:Normal: No  Memory: Poor recent  Insight and Judgment: No  Insight and Judgment: Poor judgment, Poor insight    Tobacco Screening:  Practical Counseling, on admission, jax X, if applicable and completed (first 3 are required if patient doesn't refuse):            ( ) Recognizing danger situations (included triggers and roadblocks)                    ( ) Coping skills (new ways to manage stress,relaxation techniques, changing routine, distraction)                                                           ( ) Basic information about quitting (benefits of quitting, techniques in how to quit, available resources  ( ) Referral for counseling faxed to [facility]                                                                                                                     ( ) Patient refused counseling  (X) Patient has not smoked in the last 30 days    Metabolic Screening:    Lab Results   Component Value Date    LABA1C 4.8 06/24/2019       Lab Results   Component Value Date    CHOL 165 06/24/2019    CHOL 142 07/18/2014     Lab Results   Component Value Date    TRIG 43 06/24/2019    TRIG 57 07/18/2014     Lab Results   Component Value Date    HDL 67 06/24/2019    HDL 60 07/18/2014     No components found for: LDLCAL  No results found for: LABVLDL      Body mass index is 21.03 kg/m². BP Readings from Last 2 Encounters:   10/30/22 129/80   10/28/22 124/76           Pt admitted with followings belongings:  Dental Appliances: None  Vision - Corrective Lenses: None  Hearing Aid: None  Jewelry: Earrings  Body Piercings Removed: No  Clothing: Footwear, Jacket/Coat, Pants, Shirt, Undergarments  Other Valuables: Other (Comment) (n/a)    Patient is admitted to the [address] [place] [address] Unit with the admitting diagnosis of acute psychosis. Patient was cooperative and guarded with the admission process. Patient signed the voluntary paperwork and allowed assessment by RN. Safety maintained and every 15 minute checks were initiated.      Nadia Mercer RN

## 2022-10-31 NOTE — H&P
[facility] Internal Medicine  Kash Nobles MD; Phyllis Moscoso MD; Lyla Akers MD; MD Danica Vanessa MD; MD PAPITO [facility] Internal Medicine   [facility]    HISTORY AND PHYSICAL EXAMINATION            Date:   10/31/2022  Patient name:  Sandi Koroma  Date of admission:  10/30/2022  1:48 PM  MRN:   [id number]  Account:  [de-identified]  YOB: 1990  PCP:    No primary care provider on file. Room:   [address] [place] [address]  Code Status:    Full Code    Chief Complaint:     Chief Complaint   Patient presents with    Mental Health Problem   Hypothyroidism and iron deficiency anemia  Polycystic ovarian syndrome  History Obtained From:     Patient medical record nursing staff    History of Present Illness:     Sandi Koroma is a 34 y.o.  /  female who presents with Mental Health Problem   and is admitted to the hospital for the management of Major depressive disorder, recurrent, severe with psychotic features ([facility] [address] 27-year-old lady with a history of hypothyroidism polycystic ovarian syndrome and iron deficiency anemia  hypothroid  Onset more than 2 years ago  No worsening  Mod severity  Not associated with wt loss   No sweating no heat or cold tolerance  Controlled with synthroid. Past Medical History:     Past Medical History:   Diagnosis Date    Hypothyroidism     PCOS (polycystic ovarian syndrome)         Past Surgical History:     Past Surgical History:   Procedure Laterality Date     SECTION  2016     SECTION N/A 2021     SECTION performed by Ariela Stacy So, DO at [facility] L&D OR        Medications Prior to Admission:     Prior to Admission medications    Medication Sig Start Date End Date Taking?  Authorizing Provider   levothyroxine (SYNTHROID) 50 MCG tablet Take 1 tablet by mouth daily  Patient not taking: Reported on 10/30/2022 7/22/21   Ja Wu DO   ferrous sulfate (FE TABS) 325 (65 Fe) MG EC tablet Take 1 tablet by mouth 2 times daily  Patient not taking: Reported on 10/30/2022 7/21/21   Gerson López DO   ibuprofen (ADVIL;MOTRIN) 800 MG tablet Take 1 tablet by mouth every 8 hours as needed for Pain  Patient not taking: Reported on 8/11/2021 7/21/21   Fransico Tyler, DO   famotidine (PEPCID) 20 MG tablet Take 1 tablet by mouth 2 times daily  Patient not taking: Reported on 7/1/2021 6/23/21   Leland-Miryam Hester, DO   Prenatal Multivit-Min-Fe-FA (PRENATAL VITAMINS PO) Take 1 tablet by mouth daily  Patient not taking: Reported on 7/28/2021    Historical Provider, MD        Allergies:     Patient has no known allergies. Social History:     Tobacco:    reports that she has never smoked. She has never used smokeless tobacco.  Alcohol:      reports no history of alcohol use. Drug Use:  reports no history of drug use. Family History:     Family History   Problem Relation Age of Onset    Diabetes Maternal Grandmother     Breast Cancer Neg Hx     Colon Cancer Neg Hx     Ovarian Cancer Neg Hx     Uterine Cancer Neg Hx        Review of Systems:     Positive and Negative as described in HPI.     CONSTITUTIONAL:  negative for fevers, chills, sweats, fatigue, weight loss  HEENT:  negative for vision, hearing changes, runny nose, throat pain  RESPIRATORY:  negative for shortness of breath, cough, congestion, wheezing  CARDIOVASCULAR:  negative for chest pain, palpitations  GASTROINTESTINAL:  negative for nausea, vomiting, diarrhea, constipation, change in bowel habits, abdominal pain   GENITOURINARY:  negative for difficulty of urination, burning with urination, frequency   INTEGUMENT:  negative for rash, skin lesions, easy bruising   HEMATOLOGIC/LYMPHATIC:  negative for swelling/edema   ALLERGIC/IMMUNOLOGIC:  negative for urticaria , itching  ENDOCRINE:  negative increase in drinking, increase in urination, hot or cold intolerance  MUSCULOSKELETAL:  negative joint pains, muscle aches, swelling of joints  NEUROLOGICAL:  negative for headaches, dizziness, lightheadedness, numbness, pain, tingling extremities      Physical Exam:   /80   Pulse 81   Temp 97.9 °F (36.6 °C) (Temporal)   Resp 14   Ht 5' 2\" (1.575 m)   Wt 115 lb (52.2 kg)   SpO2 96%   BMI 21.03 kg/m²   Temp (24hrs), Av.1 °F (36.7 °C), Min:97.9 °F (36.6 °C), Max:98.3 °F (36.8 °C)    No results for input(s): POCGLU in the last 72 hours. No intake or output data in the 24 hours ending 10/31/22 1628    General Appearance: alert, well appearing, and in no acute distress  Mental status: oriented to person, place, and time  Head: normocephalic, atraumatic  Eye: no icterus, redness, pupils equal and reactive, extraocular eye movements intact, conjunctiva clear  Ear: normal external ear, no discharge, hearing intact  Nose: no drainage noted  Mouth: mucous membranes moist  Neck: supple, no carotid bruits, thyroid not palpable  Lungs: Bilateral equal air entry, clear to ausculation, no wheezing, rales or rhonchi, normal effort  Cardiovascular: normal rate, regular rhythm, no murmur, gallop, rub  Abdomen: Soft, nontender, nondistended, normal bowel sounds, no hepatomegaly or splenomegaly  Neurologic: There are no new focal motor or sensory deficits, normal muscle tone and bulk, no abnormal sensation, normal speech, cranial nerves II through XII grossly intact  Skin: No gross lesions, rashes, bruising or bleeding on exposed skin area  Extremities: peripheral pulses palpable, no pedal edema or calf pain with palpation      Investigations:      Laboratory Testing:  Recent Results (from the past 24 hour(s))   TSH w/reflex to FT4    Collection Time: 10/31/22 12:53 PM   Result Value Ref Range    TSH 1.53 0.30 - 5.00 uIU/mL       Imaging/Diagnostics:  No results found.     Assessment :      Hospital Problems             Last Modified POA    * (Principal) Major depressive disorder, recurrent, severe with psychotic features ([facility] 10/31/2022 Yes    Acute psychosis ([facility] 10/31/2022 Yes    Schizoaffective disorder, depressive type ([facility] 10/31/2022 Yes       Plan:     18-year-old lady with history of polycystic ovarian syndrome of metformin  Hypothyroidism  Synthroid 50 mcg outpatient TSH check in 6 weeks  History of iron deficiency anemia secondary to menorrhagia iron sulfate 325 1 tablet twice a day outpatient follow-up with her gynecologist        Wolfgang Pathak MD  10/31/2022  4:28 PM    Copy sent to Dr. Nita Hawkins primary care provider on file. Please note that this chart was generated using voice recognition Dragon dictation software. Although every effort was made to ensure the accuracy of this automated transcription, some errors in transcription may have occurred.

## 2022-10-31 NOTE — GROUP NOTE
Psych-Ed/Relapse Prevention Group Note        Date: October 31, 2022 Start Time: 1:30pm  End Time: 2:30pm      Number of Participants in Group & Unit Census:  11    Topic: Socialization    Goal of Group:Patient will demonstrate improved interpersonal skills      Comments:     Patient did not participate in Psych-Ed/Relapse Prevention group, despite staff encouragement and explanation of benefits. Patient remain seclusive to self. Q15 minute safety checks maintained for patient safety and will continue to encourage patient to attend unit programming.          Signature:  Jorge Johansen, [address]
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
## 2022-10-31 NOTE — PLAN OF CARE
[facility]  Initial Interdisciplinary Treatment Plan NO      Original treatment plan Date & Time: 10/31/2022 1315    Admission Type:  Admission Type: Involuntary    Reason for admission:   Reason for Admission: Acute psychosis. Patient has been struggling with her mental health recently, beleving that she is going to die, weight loss of 15+ pounds, and suicidal ideation without a plan.     Estimated Length of Stay:  5-7days  Estimated Discharge Date: to be determined by physician    PATIENT STRENGTHS:  Patient Strengths:   Patient Strengths and Limitations:Limitations: Perceives need for assistance with self-care, Difficulty problem solving/relies on others to help solve problems, General negative or hopeless attitude about future/recovery, External locus of control, Unrealistic self-view  Addictive Behavior: Addictive Behavior  In the Past 3 Months, Have You Felt or Has Someone Told You That You Have a Problem With  : None  Medical Problems:  Past Medical History:   Diagnosis Date    Hypothyroidism     PCOS (polycystic ovarian syndrome)      Status EXAM:Mental Status and Behavioral Exam  Normal: No  Level of Assistance: Independent/Self  Facial Expression: Avoids Gaze, Expressionless, Flat  Affect: Blunt  Level of Consciousness: Alert  Frequency of Checks: 4 times per hour, close  Mood:Normal: No  Mood: Depressed, Anxious, Helpless, Sad, Worthless, low self-esteem  Motor Activity:Normal: Yes  Eye Contact: Fair  Observed Behavior: Cooperative, Guarded  Sexual Misconduct History: Current - no  Preception: [place] to person, [place] to time, [place] to place, [place] to situation  Attention:Normal: Yes  Thought Processes: Blocking  Thought Content:Normal: No  Thought Content: Delusions, Paranoia  Depression Symptoms: Feelings of helplessness, Feelings of hopelessess, Feelings of worthlessness, Impaired concentration  Anxiety Symptoms: Generalized  Batool Symptoms: No problems reported or observed. (patient denies)  Hallucinations: None  Delusions: Yes  Delusions: Paranoid, Persecutory  Memory:Normal: No  Memory: Poor recent  Insight and Judgment: No  Insight and Judgment: Poor judgment, Poor insight    EDUCATION:   Learner Progress Toward Treatment Goals: reviewed group plans and strategies for care    Method:group therapy, medication compliance, individualized assessments and care planning    Outcome: needs reinforcement    PATIENT GOALS: Patient refused to attend treatment team     PLAN/TREATMENT RECOMMENDATIONS:     continue group therapy , medications compliance, goal setting, individualized assessments and care, continue to monitor pt on unit      SHORT-TERM GOALS:   Time frame for Short-Term Goals: 5-7 days    LONG-TERM GOALS:  Time frame for Long-Term Goals: 6 months  Members Present in Team Meeting: See Signature Sheet    Luke Valdez RN

## 2022-10-31 NOTE — ED NOTES
Report called to [facility] ([facility]).

## 2022-10-31 NOTE — GROUP NOTE
Group Therapy Note    Date: 10/31/2022    Group Start Time: 1100  Group End Time: 1140  Group Topic: Psychoeducation    JOANNE LiS    Group Therapy Note    Attendees: 11       Patient's Goal:  Patient will identify benefits of music for coping    Notes:  Patient attended group and observed. Patient was aloof of peers and lethargic during group.      Status After Intervention:  Unchanged    Participation Level: Minimal and None    Participation Quality: Resistant and Lethargic      Speech:  mute      Thought Process/Content: Unable to assess      Affective Functioning: Blunted      Mood: anxious, depressed, and fearful      Level of consciousness:  Drowsy and Inattentive      Response to Learning: Able to verbalize current knowledge/experience and Resistant      Endings: None Reported    Modes of Intervention: Education, Support, Socialization, and Exploration      Discipline Responsible: Psychoeducational Specialist      Signature:  Alee Aguillon

## 2022-10-31 NOTE — BH NOTE
Patient refused admission picture to be taken. Patient was also provided a urine cup at this time to provide a specimen.

## 2022-10-31 NOTE — CARE COORDINATION
[facility] Biopsychosocial Assessment    Current Level of Psychosocial Functioning     Independent xxx  Dependent    Minimal Assist     Comments:    Psychosocial High Risk Factors (check all that apply)    Unable to obtain meds   Chronic illness/pain    Substance abuse   Lack of Family Support   Financial stress   Isolation   Inadequate Community Resources  Suicide attempt(s)  Not taking medications   Victim of crime   Developmental Delay  Unable to manage personal needs    Age 72 or older   Homeless  No transportation   Readmission within 30 days  Unemployment  Traumatic Event    Comments:   Psychiatric Advanced Directives: pt denies     Family to Involve in Treatment: JOHNNY; pt does not participate in assessment     Sexual Orientation:  n/a    Patient Strengths: JOHNNY; pt does not participate in assessment     Patient Barriers: JOHNNY; pt does not participate in assessment       Opiate Education Provided:  JOHNNY; pt does not participate in assessment       CMHC/mental health history: pt chart reflects pt is not connected with provider, pt does not participate in assessment     Plan of Care   medication management, group/individual therapies, family meetings, psycho -education, treatment team meetings to assist with stabilization    Initial Discharge Plan:  JOHNNY; pt does not participate in assessment       Clinical Summary:  Shadi Plata is a 34year old  female who has been admitted to [address] [place] [address] after being brought to hospital for mental health evaluation, pt chart reflects pt was \"acting bizarrely\" prior to admission. SW attempted engagement with pt for purpose of assessment, pt observed in her room, laying in bed, frantically coloring/writing on several coloring pages. Pt would not respond to attempts of SW to making introduction, would not confirm her name or look in Sw direction. Pt chart states pt is  and has two children, pt chart states pt is not linked with outpatient provider.  SW to make future attempts at engagement to offer assist as needed.

## 2022-10-31 NOTE — GROUP NOTE
Group Therapy Note    Date: 10/31/2022    Group Start Time: 0930  Group End Time: 0945  Group Topic: Community Meeting    KAYLEEN Lopez        Group Therapy Note    Attendees: 4/22     Community Meeting Group Note        Date: October 31, 2022 Start Time:  0930   End Time:  5514      Number of Participants in Group & Unit Census:  4/22    Topic: Goal Setting    Goal of Group:Set short term goal to work on throughout the day      Comments:     Patient did not participate in [employer] group, despite staff encouragement and explanation of benefits. Patient remain seclusive to self. Q15 minute safety checks maintained for patient safety and will continue to encourage patient to attend unit programming.

## 2022-10-31 NOTE — BH NOTE
Patient refused Prozac stating, \"I don't know if I want to take that yet. I need more time. Maybe tomorrow. \"

## 2022-10-31 NOTE — PLAN OF CARE
Problem: Depression  Goal: Will be euthymic at discharge  Description: INTERVENTIONS:  1. Administer medication as ordered  2. Provide emotional support via 1:1 interaction with staff  3. Encourage involvement in milieu/groups/activities  4. Monitor for social isolation  10/31/2022 1619 by Destiny Harrison RN  Outcome: Progress  Patient reports feeling depressed, but feels that their symptoms are improving at this time. Patients mood has changed and states it is improving compared to admission. Patient has been out in the dayroom at times and attempted groups. Patient endorses a decrease in any self-harm ideations and does not currently plan to self-harm. Patient has remained free from self-harm since admission. If any of these thoughts arise patient will notify nurse. Writer offered any extra time needed for patient to discuss their depression. Patient declines at this time. Q15 minute checks in place for patients' safety     Problem: Psychosis  Goal: Will report no hallucinations or delusions  Description: INTERVENTIONS:  1. Administer medication as  ordered  2. Assist with reality testing to support increasing orientation  3. Assess if patient's hallucinations or delusions are encouraging self harm or harm to others and intervene as appropriate  10/31/2022 1619 by Destiny Harrison RN  Outcome: Progressing  Patient displays some paranoia at this time. Patient has remained in behavior control and has been re-direct able during the day. Problem: Sleep Disturbance  Goal: Will exhibit normal sleeping pattern  Description: INTERVENTIONS:  1. Administer medication as ordered  2. Decrease environmental stimuli, including noise, as appropriate  3. Discourage social isolation and naps during the day  10/31/2022 1619 by Destiny Harrison RN  Outcome: Progressing  Patient has had some struggle with sleeping, but has been attempting to rest throughout the day.

## 2022-10-31 NOTE — PROGRESS NOTES
10/31/22 1126   Encounter Summary   Encounter Overview/Reason   Encounter   Service Provided For: Patient   Referral/Consult From: Rounding   Last Encounter  10/31/22   Complexity of Encounter Low   Spiritual/Emotional needs   Type Spiritual Support   Rituals, Rites and Sacraments   Type Anointing  (Ez Forbes 10/31/22)

## 2022-10-31 NOTE — PROGRESS NOTES
Comprehensive Nutrition Assessment    Type and Reason for Visit:  Initial, Positive Nutrition Screen (wt loss, poor appetite)    Nutrition Recommendations/Plan:   Will continue to provide Regular diet and add Ensure Enlive twice daily     Malnutrition Assessment:  Malnutrition Status: At risk for malnutrition (Comment) (10/31/22 1105)    Context:  Acute Illness     Findings of the 6 clinical characteristics of malnutrition:  Energy Intake:  50% or less of estimated energy requirements for 5 or more days  Weight Loss:  Unable to assess (stated wts)     Body Fat Loss:        Muscle Mass Loss:  Unable to assess    Fluid Accumulation:  Unable to assess     Strength:  Not Performed    Nutrition Assessment:    Pt was admitted to [facility] due to suidical ideation. She states she has lost 15# over the past month due to poor appetite. Current wt is stated. Nutrition Related Findings:    no edema, Labs: Reviewed, Meds: Synthroid Wound Type: None       Current Nutrition Intake & Therapies:    Average Meal Intake: 0%     ADULT DIET; Regular  ADULT ORAL NUTRITION SUPPLEMENT; Breakfast, Dinner; Standard High Calorie/High Protein Oral Supplement    Anthropometric Measures:  Height: 5' 2\" (157.5 cm)  Ideal Body Weight (IBW): 110 lbs (50 kg)    Admission Body Weight: 115 lb (52.2 kg)  Current Body Weight: 115 lb (52.2 kg),   IBW. Weight Source: Stated  Current BMI (kg/m2): 21  Usual Body Weight: 148 lb (67.1 kg) (8/21)  % Weight Change (Calculated): -22.3                    BMI Categories: Normal Weight (BMI 18.5-24. 9)    Estimated Daily Nutrient Needs:  Energy Requirements Based On: Formula  Weight Used for Energy Requirements: Admission  Energy (kcal/day): MIfflin x 1.3= 1500 kcal  Weight Used for Protein Requirements: Admission  Protein (g/day): 1.3g/kg= 65-70 g       Nutrition Diagnosis:   Inadequate oral intake related to psychological cause or life stress as evidenced by intake 0-25%    Nutrition Interventions:   Food and/or Nutrient Delivery: Continue Current Diet, Start Oral Nutrition Supplement  Nutrition Education/Counseling: No recommendation at this time  Coordination of Nutrition Care: Continue to monitor while inpatient       Goals:     Goals: PO intake 50% or greater       Nutrition Monitoring and Evaluation:      Food/Nutrient Intake Outcomes: Food and Nutrient Intake, Supplement Intake  Physical Signs/Symptoms Outcomes: Biochemical Data, GI Status, Fluid Status or Edema, Skin, Weight    Discharge Planning:    Continue current diet     Alice Echeverria, [address], [place]  Contact: [phone]

## 2022-11-01 PROCEDURE — 99231 SBSQ HOSP IP/OBS SF/LOW 25: CPT | Performed by: INTERNAL MEDICINE

## 2022-11-01 PROCEDURE — 6370000000 HC RX 637 (ALT 250 FOR IP): Performed by: PSYCHIATRY & NEUROLOGY

## 2022-11-01 PROCEDURE — 1240000000 HC EMOTIONAL WELLNESS R&B

## 2022-11-01 PROCEDURE — APPSS30 APP SPLIT SHARED TIME 16-30 MINUTES

## 2022-11-01 RX ADMIN — OLANZAPINE 5 MG: 10 TABLET, ORALLY DISINTEGRATING ORAL at 23:26

## 2022-11-01 RX ADMIN — TRAZODONE HYDROCHLORIDE 50 MG: 50 TABLET ORAL at 23:26

## 2022-11-01 RX ADMIN — HYDROXYZINE HYDROCHLORIDE 50 MG: 50 TABLET, FILM COATED ORAL at 23:26

## 2022-11-01 RX ADMIN — LEVOTHYROXINE SODIUM 50 MCG: 0.05 TABLET ORAL at 06:24

## 2022-11-01 NOTE — PROGRESS NOTES
Writer was informed by nurse that patient's sister and  were in the  asking to talk to someone. Writer walked up in the lobby and was not able to see any family on first or second floor. Nurse aware.

## 2022-11-01 NOTE — BH NOTE
Patient offered/encouraged to take nightly medications. Patient shook head and put covers over her face.

## 2022-11-01 NOTE — GROUP NOTE
Group Therapy Note    Date: 11/1/2022    Group Start Time: 1435  Group End Time: 2379  Group Topic: Music Therapy    KAYLEEN Vega    Music Therapy Group Note        Date: 11/1/2022   Start Time: 6889  End Time: 632 [facility]      Number of Participants in Group & Unit Census:  5/21    Topic: Patients shared music and dedicated songs to important people in their lives. Goal of Group: Patient goals to reflect on relationships; Increase self-expression; Increase sense of community; Increase socialization; Normalization of the environment      Comments:     Patient did not participate in Music Therapy group, despite staff encouragement and explanation of benefits. Patient remain seclusive to self. Q15 minute safety checks maintained for patient safety and will continue to encourage patient to attend unit programming.

## 2022-11-01 NOTE — GROUP NOTE
Psych-Ed/Relapse Prevention Group Note        Date: November 1, 2022 Start Time: 11am  End Time: 11:45am      Number of Participants in Group & Unit Census:  7    Topic: Creative expression and relaxation    Goal of Group:Patient will identify benefits of creative expression for relaxation and stress management. Comments:     Patient did not participate in Psych-Ed/Relapse Prevention group, despite staff encouragement and explanation of benefits. Patient remain seclusive to self. Q15 minute safety checks maintained for patient safety and will continue to encourage patient to attend unit programming.          Signature:  Denis Bush, [address]

## 2022-11-01 NOTE — GROUP NOTE
Recreation Group Note        Date: November 1, 2022 Start Time: 1:30pm  End Time:  2:15pm      Number of Participants in Group & Unit Census:  13    Topic: Leisure skills    Goal of Group:Patient will identify benefits of leisure for coping      Comments:     Patient did not participate in Recreation group, despite staff encouragement and explanation of benefits. Patient remain seclusive to self. Q15 minute safety checks maintained for patient safety and will continue to encourage patient to attend unit programming.          Signature:  Manasa Aguirre, [address]

## 2022-11-01 NOTE — PROGRESS NOTES
[address] [facility] Internal Medicine  Kamini Pham MD; Crystal Deluca MD; Tino Berrios MD; MD Elliott Gambino MD; MD PAPITO Nunez [facility] Internal Medicine   [facility]    HISTORY AND PHYSICAL EXAMINATION            Date:   2022  Patient name:  Kaylen Marte  Date of admission:  10/30/2022  1:48 PM  MRN:   [id number]  Account:  [de-identified]  YOB: 1990  PCP:    No primary care provider on file. Room:   [address] [place] [address]  Code Status:    Full Code    Chief Complaint:     Chief Complaint   Patient presents with    Mental Health Problem   Hypothyroidism and iron deficiency anemia  Polycystic ovarian syndrome  History Obtained From:     Patient medical record nursing staff    History of Present Illness:     Kaylen Marte is a 34 y.o.  /  female who presents with Mental Health Problem   and is admitted to the hospital for the management of Major depressive disorder, recurrent, severe with psychotic features ([facility] [address] 76-year-old lady with a history of hypothyroidism polycystic ovarian syndrome and iron deficiency anemia  hypothroid  Onset more than 2 years ago  No worsening  Mod severity  Not associated with wt loss   No sweating no heat or cold tolerance  Controlled with synthroid.   Patient, clinically doing better    Past Medical History:     Past Medical History:   Diagnosis Date    Hypothyroidism     PCOS (polycystic ovarian syndrome)         Past Surgical History:     Past Surgical History:   Procedure Laterality Date     SECTION  2016     SECTION N/A 2021     SECTION performed by Yeni Bain So, DO at [facility] L&D OR        Medications Prior to Admission:     Prior to Admission medications    Medication Sig Start Date End Date Taking?  Authorizing Provider   levothyroxine (SYNTHROID) 50 MCG tablet Take 1 tablet by mouth daily  Patient not taking: Reported on 10/30/2022 7/22/21   Marck Alvarado, DO   ferrous sulfate (FE TABS) 325 (65 Fe) MG EC tablet Take 1 tablet by mouth 2 times daily  Patient not taking: Reported on 10/30/2022 7/21/21   Gerson López, DO   ibuprofen (ADVIL;MOTRIN) 800 MG tablet Take 1 tablet by mouth every 8 hours as needed for Pain  Patient not taking: Reported on 8/11/2021 7/21/21   Fransico Tyler, DO   famotidine (PEPCID) 20 MG tablet Take 1 tablet by mouth 2 times daily  Patient not taking: Reported on 7/1/2021 6/23/21   See-Miryam So, DO   Prenatal Multivit-Min-Fe-FA (PRENATAL VITAMINS PO) Take 1 tablet by mouth daily  Patient not taking: Reported on 7/28/2021    Historical Provider, MD        Allergies:     Patient has no known allergies. Social History:     Tobacco:    reports that she has never smoked. She has never used smokeless tobacco.  Alcohol:      reports no history of alcohol use. Drug Use:  reports no history of drug use. Family History:     Family History   Problem Relation Age of Onset    Diabetes Maternal Grandmother     Breast Cancer Neg Hx     Colon Cancer Neg Hx     Ovarian Cancer Neg Hx     Uterine Cancer Neg Hx        Review of Systems:     Positive and Negative as described in HPI.     CONSTITUTIONAL:  negative for fevers, chills, sweats, fatigue, weight loss  HEENT:  negative for vision, hearing changes, runny nose, throat pain  RESPIRATORY:  negative for shortness of breath, cough, congestion, wheezing  CARDIOVASCULAR:  negative for chest pain, palpitations  GASTROINTESTINAL:  negative for nausea, vomiting, diarrhea, constipation, change in bowel habits, abdominal pain   GENITOURINARY:  negative for difficulty of urination, burning with urination, frequency   INTEGUMENT:  negative for rash, skin lesions, easy bruising   HEMATOLOGIC/LYMPHATIC:  negative for swelling/edema   ALLERGIC/IMMUNOLOGIC:  negative for urticaria , itching  ENDOCRINE:  negative increase in drinking, increase in urination, hot or cold intolerance  MUSCULOSKELETAL:  negative joint pains, muscle aches, swelling of joints  NEUROLOGICAL:  negative for headaches, dizziness, lightheadedness, numbness, pain, tingling extremities      Physical Exam:   /68   Pulse 86   Temp 98.6 °F (37 °C) (Oral)   Resp 14   Ht 5' 2\" (1.575 m)   Wt 115 lb (52.2 kg)   SpO2 96%   BMI 21.03 kg/m²   Temp (24hrs), Av.6 °F (37 °C), Min:98.6 °F (37 °C), Max:98.6 °F (37 °C)    No results for input(s): POCGLU in the last 72 hours. No intake or output data in the 24 hours ending 22 1448    General Appearance: alert, well appearing, and in no acute distress  Mental status: oriented to person, place, and time  Head: normocephalic, atraumatic  Eye: no icterus, redness, pupils equal and reactive, extraocular eye movements intact, conjunctiva clear  Ear: normal external ear, no discharge, hearing intact  Nose: no drainage noted  Mouth: mucous membranes moist  Neck: supple, no carotid bruits, thyroid not palpable  Lungs: Bilateral equal air entry, clear to ausculation, no wheezing, rales or rhonchi, normal effort  Cardiovascular: normal rate, regular rhythm, no murmur, gallop, rub  Abdomen: Soft, nontender, nondistended, normal bowel sounds, no hepatomegaly or splenomegaly  Neurologic: There are no new focal motor or sensory deficits, normal muscle tone and bulk, no abnormal sensation, normal speech, cranial nerves II through XII grossly intact  Skin: No gross lesions, rashes, bruising or bleeding on exposed skin area  Extremities: peripheral pulses palpable, no pedal edema or calf pain with palpation      Investigations:      Laboratory Testing:  No results found for this or any previous visit (from the past 24 hour(s)). Imaging/Diagnostics:  No results found.     Assessment :      Hospital Problems             Last Modified POA    * (Principal) Major depressive disorder, recurrent, severe with psychotic features ([facility] [address] 10/31/2022 Yes    Acute psychosis ([facility] [address] 10/31/2022 Yes    Schizoaffective disorder, depressive type ([address] 10/31/2022 Yes    Depression with suicidal ideation 10/31/2022 Yes       Plan:     69-year-old lady with history of polycystic ovarian syndrome of metformin  Hypothyroidism  Synthroid 50 mcg outpatient TSH check in 6 weeks  History of iron deficiency anemia secondary to menorrhagia iron sulfate 325 1 tablet twice a day outpatient follow-up with her gynecologist  11/1  No new complaints  We will sign off, please call with questions      Eliceo Atkinson MD  11/1/2022  2:48 PM    Copy sent to Dr. Kimberly Champion primary care provider on file. Please note that this chart was generated using voice recognition Dragon dictation software. Although every effort was made to ensure the accuracy of this automated transcription, some errors in transcription may have occurred.

## 2022-11-01 NOTE — GROUP NOTE
Group Therapy Note    Date: 11/1/2022    Group Start Time: 1000  Group End Time: 6663  Group Topic: Psychotherapy    [address], LSW        Group Therapy Note    Attendees: 5/21     patient refused to attend psychotherapy group at [address] [place] [address] after encouragement from staff.   1:1 talk time provided as alternative to group session

## 2022-11-01 NOTE — PROGRESS NOTES
Daily Progress Note  11/1/2022    Patient Name: Steve Chahal    CHIEF COMPLAINT:  Depression with suicidal ideation          SUBJECTIVE:      Patient is seen today for a follow up assessment. Patient was will intermittently engage in assessment where she reports feeling tired and having thoughts of self-harm. Patient unwilling to share plan but does state I just want to handle and refuses to elaborate further. She was thought blocking, evasive, flat, lacks eye contact and continues to not answer questions upon assessment. She provided minimal nodding to some questions and stated not wanting to continue assessment. Writer tried to discuss ECT treatment with patient however patient not willing to engage at this time. Nursing staff reports patient being in day room, aloof of peers, nervous and tearful most of the day however she continues to refuse medication even after encouragement. She has utilized as needed Atarax for anxiety. She has not required any emergency medication last 24 hours writer was informed the patient is refusing vital signs and keeps asking breast pump however changes her mind when given pump. Patient unable to contract for safety out in the community. There is a risk for decompensation and patient warrants inpatient hospitalization for stability and safety.      Appetite:  [] Adequate/Unchanged  [] Increased  [x] Decreased      Sleep:       [] Adequate/Unchanged  [] Fair  [x] Poor      Group Attendance on Unit:   [] Yes   [] Selectively    [x] No    Compliant with scheduled medications: [] Yes  [x] No    Received emergency medications in past 24 hrs: [] Yes   [x] No    Medication Side Effects: Denies          Mental Status Exam  Level of consciousness: Alert and awake   Appearance: Appropriate attire for setting, standing, with poor grooming and hygiene   Behavior/Motor: Approachable, restlessness   Attitude toward examiner: Poor cooperation and concentration, no eye contact  Speech: slow and whispered,, latency, mostly shoulder nods for answers  Mood: Depressed  Affect: Mood congruent  Thought processes: slow, illogical, thought blocking, and incoherent   Thought content: Denies homicidal ideation  Suicidal Ideation: Active suicidal ideations, unable to contracts for safety off the unit. Delusions: No evidence of delusions. Perceptual Disturbance: Patient does appear to be responding to internal stimuli. Cognition: Unable to assess, patient unwilling to engage  Memory: impaired   Insight: Poor to absent  Judgement: Poor to absent    Data   height is 5' 2\" (1.575 m) and weight is 115 lb (52.2 kg). Her oral temperature is 98.6 °F (37 °C). Her blood pressure is 109/68 and her pulse is 86. Her respiration is 14 and oxygen saturation is 96%.    Labs:   Admission on 10/30/2022   Component Date Value Ref Range Status    WBC 10/30/2022 6.3  3.5 - 11.0 k/uL Final    RBC 10/30/2022 5.14  4.0 - 5.2 m/uL Final    Hemoglobin 10/30/2022 15.0  12.0 - 16.0 g/dL Final    Hematocrit 10/30/2022 45.8  36 - 46 % Final    MCV 10/30/2022 89.0  80 - 100 fL Final    MCH 10/30/2022 29.2  26 - 34 pg Final    MCHC 10/30/2022 32.8  31 - 37 g/dL Final    RDW 10/30/2022 13.6  11.5 - 14.9 % Final    Platelets 74/58/6837 297  150 - 450 k/uL Final    MPV 10/30/2022 8.7  6.0 - 12.0 fL Final    Seg Neutrophils 10/30/2022 74 (A)  36 - 66 % Final    Lymphocytes 10/30/2022 19 (A)  24 - 44 % Final    Monocytes 10/30/2022 7  1 - 7 % Final    Eosinophils % 10/30/2022 0  0 - 4 % Final    Basophils 10/30/2022 0  0 - 2 % Final    Segs Absolute 10/30/2022 4.60  1.3 - 9.1 k/uL Final    Absolute Lymph # 10/30/2022 1.20  1.0 - 4.8 k/uL Final    Absolute Mono # 10/30/2022 0.40  0.1 - 1.3 k/uL Final    Absolute Eos # 10/30/2022 0.00  0.0 - 0.4 k/uL Final    Basophils Absolute 10/30/2022 0.00  0.0 - 0.2 k/uL Final    Glucose 10/30/2022 104 (A)  70 - 99 mg/dL Final    BUN 10/30/2022 13  6 - 20 mg/dL Final    Creatinine 10/30/2022 0.59  0.50 - 0.90 mg/dL Final    Est, Glom Filt Rate 10/30/2022 >60  >60 mL/min/1.73m2 Final    Comment:       Effective Oct 3, 2022        These results are not intended for use in patients <25years of age. eGFR results are calculated without a race factor using the 2021 CKD-EPI equation. Careful clinical correlation is recommended, particularly when comparing to results   calculated using previous equations. The CKD-EPI equation is less accurate in patients with extremes of muscle mass, extra-renal   metabolism of creatine, excessive creatine ingestion, or following therapy that affects   renal tubular secretion. Calcium 10/30/2022 9.5  8.6 - 10.4 mg/dL Final    Sodium 10/30/2022 140  135 - 144 mmol/L Final    Potassium 10/30/2022 3.9  3.7 - 5.3 mmol/L Final    Chloride 10/30/2022 102  98 - 107 mmol/L Final    CO2 10/30/2022 21  20 - 31 mmol/L Final    Anion Gap 10/30/2022 17  9 - 17 mmol/L Final    Alkaline Phosphatase 10/30/2022 99  35 - 104 U/L Final    ALT 10/30/2022 9  5 - 33 U/L Final    AST 10/30/2022 14  <32 U/L Final    Total Bilirubin 10/30/2022 0.5  0.3 - 1.2 mg/dL Final    Total Protein 10/30/2022 8.2  6.4 - 8.3 g/dL Final    Albumin 10/30/2022 5.2  3.5 - 5.2 g/dL Final    Ethanol 10/30/2022 <10  <10 mg/dL Final    Ethanol percent 10/30/2022 <0.010  % Final    hCG Qual 10/30/2022 NEGATIVE  NEGATIVE Final    Comment: Specimens with hCG levels near the threshold of the test (25 mIU/mL) may give a negative or   indeterminate result. In such cases, another test should be performed with a new specimen   in 48-72 hours. If early pregnancy is suspected clinically in this setting, correlation   with quantitative serum b-hCG level is suggested. TSH 10/31/2022 1.53  0.30 - 5.00 uIU/mL Final         Reviewed patient's current plan of care and vital signs with nursing staff.     Labs reviewed: [x] Yes    Medications  Current Facility-Administered Medications: FLUoxetine (PROZAC) capsule 10 mg, 10 mg, Oral, Daily  levothyroxine (SYNTHROID) tablet 50 mcg, 50 mcg, Oral, Daily  ferrous sulfate (IRON 325) tablet 325 mg, 325 mg, Oral, BID  OLANZapine zydis (ZYPREXA) disintegrating tablet 5 mg, 5 mg, Oral, Nightly  haloperidol lactate (HALDOL) injection 5 mg, 5 mg, IntraMUSCular, Q6H PRN **AND** LORazepam (ATIVAN) injection 2 mg, 2 mg, IntraMUSCular, Q6H PRN **AND** diphenhydrAMINE (BENADRYL) injection 50 mg, 50 mg, IntraMUSCular, Q6H PRN  acetaminophen (TYLENOL) tablet 650 mg, 650 mg, Oral, Q6H PRN  ibuprofen (ADVIL;MOTRIN) tablet 400 mg, 400 mg, Oral, Q6H PRN  hydrOXYzine HCl (ATARAX) tablet 50 mg, 50 mg, Oral, TID PRN  traZODone (DESYREL) tablet 50 mg, 50 mg, Oral, Nightly PRN  polyethylene glycol (GLYCOLAX) packet 17 g, 17 g, Oral, Daily PRN  aluminum & magnesium hydroxide-simethicone (MAALOX) 200-200-20 MG/5ML suspension 30 mL, 30 mL, Oral, Q6H PRN  nicotine polacrilex (NICORETTE) gum 2 mg, 2 mg, Oral, Q2H PRN    ASSESSMENT  Major depressive disorder, recurrent, severe with psychotic features ([facility] [address]         HANDOFF  Patient symptoms are: Unstable  Medications as determined by attending physician  Patient recently started on Prozac 10 mg p.o. daily and Zyprexa 5 mg p.o. at bedtime however patient continues to refuse medication. Encourage participation in groups and milieu. Probable discharge is to be determined by MD    Electronically signed by RON Muhammad CNP on 11/1/2022 at 4:43 PM    **This report has been created using voice recognition software. It may contain minor errors which are inherent in voice recognition technology. **     I independently saw and evaluated the patient. I reviewed the nurse practitioners documentation above. Any additional comments or changes to the nurse practitioners documentation are stated below otherwise agree with assessment.   Plan will be as follows:  Spent 30 minutes with the patient, of that greater than 16 minutes was spent in supportive psychotherapy. Patient expressing concern over infidelity of her partner. She is reporting auditory hallucinations which are telling her both to harm herself and that her partner has been unfaithful in addition to other things. Has been noncompliant with medication thus far. Stating she is having serious issues with trust.  She is a little more engaged today in conversation. Discussed again medication, ECT. Patient stating she is having a hard time trusting anything. Spent significant time in issues of trust.  Encouraging well compliance  PLAN  Patient s symptoms   are improved from the standpoint of she is at least engaging in meaningful conversation  Encourage oral compliance with medication  Attempt to develop insight  Psycho-education conducted. Supportive Therapy conducted.   Probable discharge is undetermined at this time  Follow-up daily while on inpatient unit

## 2022-11-01 NOTE — PROGRESS NOTES
Lis Urrutia, spouse in lobby requesting to speak to writer for update on patient prior to visiting. Update given on patient's current treatment regiment and current status. We discussed the possibility of ECT treatment and the importance of patient consenting. Writer gave spouse gave verbal information and intervention of psychotherapy template. He stated that he will discussed with patient and her family the new recommended treatment. All questions answered.

## 2022-11-02 PROCEDURE — APPSS30 APP SPLIT SHARED TIME 16-30 MINUTES

## 2022-11-02 PROCEDURE — 1240000000 HC EMOTIONAL WELLNESS R&B

## 2022-11-02 ASSESSMENT — LIFESTYLE VARIABLES
HOW MANY STANDARD DRINKS CONTAINING ALCOHOL DO YOU HAVE ON A TYPICAL DAY: PATIENT DOES NOT DRINK
HOW OFTEN DO YOU HAVE A DRINK CONTAINING ALCOHOL: NEVER

## 2022-11-02 NOTE — PLAN OF CARE
Problem: Psychosis  Goal: Will report no hallucinations or delusions  Description: INTERVENTIONS:  1. Administer medication as  ordered  2. Assist with reality testing to support increasing orientation  3. Assess if patient's hallucinations or delusions are encouraging self harm or harm to others and intervene as appropriate  11/1/2022 2137 by Parris Garcai LPN  Note: Patient would not talk during 1 to 1 time. She slightly shook her head \"No\" when asked about suicidal ideations and hallucinations. She has been in her room resting with her head covered with a blanket all evening. She is refusing evening medications despite encouragement. Q15min safety checks continue     Problem: Sleep Disturbance  Goal: Will exhibit normal sleeping pattern  Description: INTERVENTIONS:  1. Administer medication as ordered  2. Decrease environmental stimuli, including noise, as appropriate  3. Discourage social isolation and naps during the day  11/1/2022 2137 by Parris Garcia LPN  Note: Patient has been sleeping all evening with her head covered with a blanket. She refused to remove the blanket during 1 to 1 time.  Q15min safety checks continue

## 2022-11-02 NOTE — GROUP NOTE
Recreation Group Note        Date: November 2, 2022 Start Time: 11am  End Time:  11.35am      Number of Participants in Group & Unit Census:  5    Topic: Socialization    Goal of Group:Patient will demonstrate improved interpersonal skills      Comments:     Patient did not participate in Recreation group, despite staff encouragement and explanation of benefits. Patient remain seclusive to self. Q15 minute safety checks maintained for patient safety and will continue to encourage patient to attend unit programming.          Signature:  Kim Valle, [address]

## 2022-11-02 NOTE — GROUP NOTE
Group Therapy Note    Date: 11/2/2022    Group Start Time: 1000  Group End Time: 8012  Group Topic: Psychoeducation    KAYLEEN CHAN    ADRIENNE Brito        Group Therapy Note    Attendees: 4/21       Patient's Goal:  exercising self compassion    Notes:  therapeutic worksheet provided and discussed by group, pt came to group laid in fetal position in chair with head covered throughout group, remained silent throughout group     Status After Intervention:  Decompensated    Participation Level: None    Participation Quality: Resistant      Speech:  mute      Thought Process/Content: Logical      Affective Functioning: Flat and Constricted/Restricted      Mood:  unknown      Level of consciousness:  Drowsy      Response to Learning: Progressing to goal      Endings: None Reported    Modes of Intervention: Support      Discipline Responsible: /Counselor      Signature:  ADRIENNE Brito

## 2022-11-02 NOTE — PROGRESS NOTES
Daily Progress Note  11/2/2022    Patient Name: Barbie Forman    CHIEF COMPLAINT:  Depression with suicidal ideation          SUBJECTIVE:      Patient is seen today for a follow up assessment. Nursing staff reports patient took medication last night however not compliant with medication today. She is evasive and continues to have minimal engagement upon conversation. Patient states having issues with straws however unwilling to engage further. She reports feeling \"tired\" and noted to be closing her eyes frequently and not answer any questions. Attempted to discuss medication and ECT treatment however patient closed her eyes and refused to engage. Shortly after patient was seen walking in the hallway where attempt was made to engage with the writer and patient refused. She shows no insight to mental illness and on symptoms. When asked about thoughts of self-harm patient answered \"I do not know I just want to lay down and lay down\" and lay down in day room with with eyes closed. Writer educated and encouraged patient to comply in medication and group programming.     Appetite:  [] Adequate/Unchanged  [] Increased  [x] Decreased      Sleep:       [] Adequate/Unchanged  [] Fair  [x] Poor      Group Attendance on Unit:   [] Yes   [] Selectively    [x] No    Compliant with scheduled medications: [] Yes  [x] No    Received emergency medications in past 24 hrs: [] Yes   [x] No    Medication Side Effects: Denies          Mental Status Exam  Level of consciousness: Alert and awake   Appearance: Appropriate attire for setting, standing, with poor grooming and hygiene   Behavior/Motor: Approachable, restlessness   Attitude toward examiner: Poor cooperation and concentration, no eye contact  Speech: slow and whispered,, latency, mostly shoulder nods for answers  Mood: Depressed  Affect: Mood congruent  Thought processes: slow, illogical, thought blocking, and incoherent   Thought content: Unknown if homicidal ideation  Suicidal Ideation: Unknown if improvement suicidal ideations, unable to contracts for safety off the unit. Delusions: No evidence of delusions. Perceptual Disturbance: Patient does appear to be responding to internal stimuli. Cognition: Unable to assess, patient unwilling to engage  Memory: impaired   Insight: Poor to absent  Judgement: Poor to absent    Data   height is 5' 2\" (1.575 m) and weight is 115 lb (52.2 kg). Her oral temperature is 98.6 °F (37 °C). Her blood pressure is 119/82 and her pulse is 85. Her respiration is 16 and oxygen saturation is 96%.    Labs:   Admission on 10/30/2022   Component Date Value Ref Range Status    WBC 10/30/2022 6.3  3.5 - 11.0 k/uL Final    RBC 10/30/2022 5.14  4.0 - 5.2 m/uL Final    Hemoglobin 10/30/2022 15.0  12.0 - 16.0 g/dL Final    Hematocrit 10/30/2022 45.8  36 - 46 % Final    MCV 10/30/2022 89.0  80 - 100 fL Final    MCH 10/30/2022 29.2  26 - 34 pg Final    MCHC 10/30/2022 32.8  31 - 37 g/dL Final    RDW 10/30/2022 13.6  11.5 - 14.9 % Final    Platelets 79/70/2336 297  150 - 450 k/uL Final    MPV 10/30/2022 8.7  6.0 - 12.0 fL Final    Seg Neutrophils 10/30/2022 74 (A)  36 - 66 % Final    Lymphocytes 10/30/2022 19 (A)  24 - 44 % Final    Monocytes 10/30/2022 7  1 - 7 % Final    Eosinophils % 10/30/2022 0  0 - 4 % Final    Basophils 10/30/2022 0  0 - 2 % Final    Segs Absolute 10/30/2022 4.60  1.3 - 9.1 k/uL Final    Absolute Lymph # 10/30/2022 1.20  1.0 - 4.8 k/uL Final    Absolute Mono # 10/30/2022 0.40  0.1 - 1.3 k/uL Final    Absolute Eos # 10/30/2022 0.00  0.0 - 0.4 k/uL Final    Basophils Absolute 10/30/2022 0.00  0.0 - 0.2 k/uL Final    Glucose 10/30/2022 104 (A)  70 - 99 mg/dL Final    BUN 10/30/2022 13  6 - 20 mg/dL Final    Creatinine 10/30/2022 0.59  0.50 - 0.90 mg/dL Final    Est, Glom Filt Rate 10/30/2022 >60  >60 mL/min/1.73m2 Final    Comment:       Effective Oct 3, 2022        These results are not intended for use in patients <18 years of age.        eGFR results are calculated without a race factor using the 2021 CKD-EPI equation. Careful clinical correlation is recommended, particularly when comparing to results   calculated using previous equations. The CKD-EPI equation is less accurate in patients with extremes of muscle mass, extra-renal   metabolism of creatine, excessive creatine ingestion, or following therapy that affects   renal tubular secretion. Calcium 10/30/2022 9.5  8.6 - 10.4 mg/dL Final    Sodium 10/30/2022 140  135 - 144 mmol/L Final    Potassium 10/30/2022 3.9  3.7 - 5.3 mmol/L Final    Chloride 10/30/2022 102  98 - 107 mmol/L Final    CO2 10/30/2022 21  20 - 31 mmol/L Final    Anion Gap 10/30/2022 17  9 - 17 mmol/L Final    Alkaline Phosphatase 10/30/2022 99  35 - 104 U/L Final    ALT 10/30/2022 9  5 - 33 U/L Final    AST 10/30/2022 14  <32 U/L Final    Total Bilirubin 10/30/2022 0.5  0.3 - 1.2 mg/dL Final    Total Protein 10/30/2022 8.2  6.4 - 8.3 g/dL Final    Albumin 10/30/2022 5.2  3.5 - 5.2 g/dL Final    Ethanol 10/30/2022 <10  <10 mg/dL Final    Ethanol percent 10/30/2022 <0.010  % Final    hCG Qual 10/30/2022 NEGATIVE  NEGATIVE Final    Comment: Specimens with hCG levels near the threshold of the test (25 mIU/mL) may give a negative or   indeterminate result. In such cases, another test should be performed with a new specimen   in 48-72 hours. If early pregnancy is suspected clinically in this setting, correlation   with quantitative serum b-hCG level is suggested. TSH 10/31/2022 1.53  0.30 - 5.00 uIU/mL Final         Reviewed patient's current plan of care and vital signs with nursing staff.     Labs reviewed: [x] Yes    Medications  Current Facility-Administered Medications: FLUoxetine (PROZAC) capsule 10 mg, 10 mg, Oral, Daily  levothyroxine (SYNTHROID) tablet 50 mcg, 50 mcg, Oral, Daily  ferrous sulfate (IRON 325) tablet 325 mg, 325 mg, Oral, BID  OLANZapine zydis (ZYPREXA) disintegrating tablet 5 mg, 5 mg, Oral, Nightly  haloperidol lactate (HALDOL) injection 5 mg, 5 mg, IntraMUSCular, Q6H PRN **AND** LORazepam (ATIVAN) injection 2 mg, 2 mg, IntraMUSCular, Q6H PRN **AND** diphenhydrAMINE (BENADRYL) injection 50 mg, 50 mg, IntraMUSCular, Q6H PRN  acetaminophen (TYLENOL) tablet 650 mg, 650 mg, Oral, Q6H PRN  ibuprofen (ADVIL;MOTRIN) tablet 400 mg, 400 mg, Oral, Q6H PRN  hydrOXYzine HCl (ATARAX) tablet 50 mg, 50 mg, Oral, TID PRN  traZODone (DESYREL) tablet 50 mg, 50 mg, Oral, Nightly PRN  polyethylene glycol (GLYCOLAX) packet 17 g, 17 g, Oral, Daily PRN  aluminum & magnesium hydroxide-simethicone (MAALOX) 200-200-20 MG/5ML suspension 30 mL, 30 mL, Oral, Q6H PRN  nicotine polacrilex (NICORETTE) gum 2 mg, 2 mg, Oral, Q2H PRN    ASSESSMENT  Major depressive disorder, recurrent, severe with psychotic features ([facility] [address]         HANDOFF  Patient symptoms are: No change  Medications as determined by attending physician  Encourage participation in groups and milieu. Probable discharge is to be determined by MD    Electronically signed by RON Pratt CNP on 11/2/2022 at 4:19 PM    **This report has been created using voice recognition software. It may contain minor errors which are inherent in voice recognition technology. **     I independently saw and evaluated the patient. I reviewed the nurse practitioners documentation above. Any additional comments or changes to the nurse practitioners documentation are stated below otherwise agree with assessment. Plan will be as follows:  Patient still psychotically depressed. Difficult to engage in sustained meaningful conversation. She did report improved sleep and she took her olanzapine last night but then refused morning medications. She is not able to offer any reason for her refusal of medications. She has virtually no eye contact during conversation. Still curled up in fetal position at times. Clearly responds to internal stimuli.   Will encourage oral compliance with medication. Family trying to encourage her to comply with medication as well. Remains suicidal and unable to contract for safety outside of the hospital  PLAN  Patient s symptoms   show no change  Encourage oral compliance with medication  Patient has not requested to leave, however I have instructed staff if she does request to have her sign a 72-hour notice. May be of best way to get her to force medications to her court hearing  Attempt to develop insight  Psycho-education conducted. Supportive Therapy conducted.   Probable discharge is undetermined at this time  Follow-up daily while on inpatient unit

## 2022-11-02 NOTE — PLAN OF CARE
[facility]  Day 3 Interdisciplinary Treatment Plan NOTE    Review Date & Time: 11/2/2022  1255    Admission Type:   Admission Type: Involuntary    Reason for admission:  Reason for Admission: Acute psychosis. Patient has been struggling with her mental health recently, beleving that she is going to die, weight loss of 15+ pounds, and suicidal ideation without a plan.   Estimated Length of Stay: 5-7 days  Estimated Discharge Date Update: to be determined by physician    PATIENT STRENGTHS:  Patient Strengths    Patient Strengths and Limitations:Limitations: Perceives need for assistance with self-care, Difficulty problem solving/relies on others to help solve problems, General negative or hopeless attitude about future/recovery, External locus of control, Unrealistic self-view  Addictive Behavior:Addictive Behavior  In the Past 3 Months, Have You Felt or Has Someone Told You That You Have a Problem With  : None  Medical Problems:  Past Medical History:   Diagnosis Date    Hypothyroidism     PCOS (polycystic ovarian syndrome)        Risk:  Fall Risk   Joshua Scale Joshua Scale Score: 22  BVC    Change in scores no Changes to plan of Care no    Status EXAM:   Mental Status and Behavioral Exam  Normal: No  Level of Assistance: Independent/Self  Facial Expression: Expressionless, Flat, Sad, Worried  Affect: Appropriate, Blunt  Level of Consciousness: Somnolent  Frequency of Checks: 4 times per hour, close  Mood:Normal: No  Mood: Helpless, Empty, Depressed, Guilty  Motor Activity:Normal: No  Motor Activity: Decreased  Eye Contact: Poor  Observed Behavior: Guarded, Withdrawn  Sexual Misconduct History: Current - no  Preception: [place] to person, [place] to time, [place] to situation, [place] to place  Attention:Normal: No  Attention: Distractible  Thought Processes: Blocking  Thought Content:Normal: No  Thought Content: Poverty of content  Depression Symptoms: Impaired concentration  Anxiety Symptoms: Generalized  [place] Symptoms: No problems reported or observed. Hallucinations: None  Delusions: Yes  Delusions: Paranoid  Memory:Normal: No  Memory: Poor recent, Poor remote  Insight and Judgment: No  Insight and Judgment: Poor judgment, Poor insight    Daily Assessment Last Entry:   Daily Sleep (WDL): Within Defined Limits            Daily Nutrition (WDL): Within Defined Limits  Level of Assistance: Independent/Self    Patient Monitoring:  Frequency of Checks: 4 times per hour, close    Psychiatric Symptoms:   Depression Symptoms  Depression Symptoms: Impaired concentration  Anxiety Symptoms  Anxiety Symptoms: Generalized  [place] Symptoms  [place] Symptoms: No problems reported or observed. Suicide Risk CSSR-S:  1) Within the past month, have you wished you were dead or wished you could go to sleep and not wake up? : Yes  2) Have you actually had any thoughts of killing yourself? : Yes  3) Have you been thinking about how you might kill yourself? : No  5) Have you started to work out or worked out the details of how to kill yourself?  Do you intend to carry out this plan? : No  6) Have you ever done anything, started to do anything, or prepared to do anything to end your life?: No  Change in Result NO Change in Plan of care NO      EDUCATION:   EDUCATION:   Learner Progress Toward Treatment Goals: Reviewed results and recommendations of this team, Reviewed group plan and strategies, Reviewed signs, symptoms and risk of self harm and violent behavior, Reviewed goals and plan of care    Method:small group, individual verbal education    Outcome:verbalized by patient, but needs reinforcement to obtain goals    PATIENT GOALS:  Short term: per psychoeducational specialist, patient unable to attend treatment team to discuss goals at this time  Long term: per psychoeducational specialist, patient unable to attend treatment team to discuss goals at this time    PLAN/TREATMENT RECOMMENDATIONS UPDATE: continue with group therapies, increased socialization, continue planning for after discharge goals, continue with medication compliance    SHORT-TERM GOALS UPDATE:   Time frame for Short-Term Goals: 5-7 days    LONG-TERM GOALS UPDATE:   Time frame for Long-Term Goals: 6 months  Members Present in Team Meeting: See Signature Sheet    Shruthi Guardado RN

## 2022-11-02 NOTE — PLAN OF CARE
Problem: Depression  Goal: Will be euthymic at discharge  Description: INTERVENTIONS:  1. Administer medication as ordered  2. Provide emotional support via 1:1 interaction with staff  3. Encourage involvement in milieu/groups/activities  4. Monitor for social isolation  Outcome: Progressing     Problem: Psychosis  Goal: Will report no hallucinations or delusions  Description: INTERVENTIONS:  1. Administer medication as  ordered  2. Assist with reality testing to support increasing orientation  3. Assess if patient's hallucinations or delusions are encouraging self harm or harm to others and intervene as appropriate  Outcome: Progressing     Problem: Sleep Disturbance  Goal: Will exhibit normal sleeping pattern  Description: INTERVENTIONS:  1. Administer medication as ordered  2. Decrease environmental stimuli, including noise, as appropriate  3. Discourage social isolation and naps during the day  Outcome: Progressing     Patient remains out in the dayroom but aloof of peers. Patient does not admits to hallucinations. Patient does not answer assessment questions. Patient refused medications. Patient does not sleep with normal sleeping patterns. Will continue to monitor and offer support as needed.

## 2022-11-02 NOTE — BH NOTE
Patient woke up around 2320 and was sitting in the dayroom with her head on the table. She stated she is lonely and scared and her thoughts are all over. She agreed to take medication and was given her evening zyprexa 5mg, atarax 50mg, and trazodone 50mg. She took the medications with much encouragement. She stated she \"doesn't trust people\" and asked if therapy was available. Writer encouraged patient to speak with her doctor about her concerns and also encouraged her to attend groups during the day.  She is currently resting in her room, safety checks continue

## 2022-11-02 NOTE — GROUP NOTE
Group Therapy Note    Date: 11/2/2022    Group Start Time: 0900  Group End Time: 0915  Group Topic: Community Meeting    KAYLEEN Stratton RN    Community Meeting Group Note        Date: November 2, 2022 Start Time: 9am  End Time:  0915am      Number of Participants in Group & Unit Census:  15/20    Topic: Goals group    Goal of Group:to establish a goal      Comments:     Patient did not participate in [employer] group, despite staff encouragement and explanation of benefits. Patient remain seclusive to self. Q15 minute safety checks maintained for patient safety and will continue to encourage patient to attend unit programming.        Group Therapy Note    Attendees: 15/20

## 2022-11-03 PROCEDURE — APPSS30 APP SPLIT SHARED TIME 16-30 MINUTES

## 2022-11-03 PROCEDURE — 6370000000 HC RX 637 (ALT 250 FOR IP): Performed by: PSYCHIATRY & NEUROLOGY

## 2022-11-03 PROCEDURE — 1240000000 HC EMOTIONAL WELLNESS R&B

## 2022-11-03 RX ADMIN — FLUOXETINE 10 MG: 10 CAPSULE ORAL at 08:29

## 2022-11-03 RX ADMIN — FERROUS SULFATE TAB 325 MG (65 MG ELEMENTAL FE) 325 MG: 325 (65 FE) TAB at 08:28

## 2022-11-03 RX ADMIN — FERROUS SULFATE TAB 325 MG (65 MG ELEMENTAL FE) 325 MG: 325 (65 FE) TAB at 21:39

## 2022-11-03 ASSESSMENT — LIFESTYLE VARIABLES
HOW OFTEN DO YOU HAVE A DRINK CONTAINING ALCOHOL: NEVER
HOW MANY STANDARD DRINKS CONTAINING ALCOHOL DO YOU HAVE ON A TYPICAL DAY: PATIENT DOES NOT DRINK

## 2022-11-03 NOTE — GROUP NOTE
Group Therapy Note    Date: 11/3/2022    Group Start Time: 1000  Group End Time: 6710  Group Topic: Music Therapy    KAYLEEN Hathaway    Music Therapy Group Note        Date: 11/3/2022   Start Time: 1000  End Time: 1050      Number of Participants in Group & Unit Census:  8/20    Topic: Patients shared music and analyzed music for themes, and shared general advice with peers based on the themes of their music. Goal of Group:Patient goals to engage in peer support; Increase sense of community; Increase socialization; Normalization of the environment; Increase self-expression      Comments:     Patient did not participate in Music Therapy group, despite staff encouragement and explanation of benefits. Patient remain seclusive to self. Q15 minute safety checks maintained for patient safety and will continue to encourage patient to attend unit programming.

## 2022-11-03 NOTE — GROUP NOTE
Relaxation Group Note        Date: November 3, 2022 Start Time: 1:30pm  End Time:  2:20pm      Number of Participants in Group & Unit Census:  3    Topic: Relaxation    Goal of Group:Patient will identify benefits of creative expression for relaxation and stress management      Comments:     Patient did not participate in Relaxation group, despite staff encouragement and explanation of benefits. Patient remain seclusive to self. Q15 minute safety checks maintained for patient safety and will continue to encourage patient to attend unit programming.          Signature:  Elaine Farah, [address]

## 2022-11-03 NOTE — PLAN OF CARE
Problem: Psychosis  Goal: Will report no hallucinations or delusions  Description: INTERVENTIONS:  1. Administer medication as  ordered  2. Assist with reality testing to support increasing orientation  3. Assess if patient's hallucinations or delusions are encouraging self harm or harm to others and intervene as appropriate  11/2/2022 2039 by Anthony Wilde LPN  Note: Patient is laying in bed resting, writer attempted to speak with patient and she only answered questions regarding suicidal thoughts and hallucinations by shaking her head \"no\". She has been isolative to her room most of the evening. She came out to the dayroom one time for a phone call and then picked up the phone and immediately hung it up and returned to her room. Q15min safety checks continue     Problem: Sleep Disturbance  Goal: Will exhibit normal sleeping pattern  Description: INTERVENTIONS:  1. Administer medication as ordered  2. Decrease environmental stimuli, including noise, as appropriate  3. Discourage social isolation and naps during the day  11/2/2022 2039 by Anthony Wilde LPN  Note: Patient has been sleeping most of the evening and has only been out in the dayroom one time.

## 2022-11-03 NOTE — PROGRESS NOTES
Daily Progress Note  11/3/2022    Patient Name: Linda Aranda    CHIEF COMPLAINT:  Depression with suicidal ideation          SUBJECTIVE:      Patient is seen today for a follow up assessment. She continues to have no change in symptoms. Patient refused to engage at all today in assessment. She had her head covered with a white sheet and was unwilling to answer any questions. Writer attempted to encourage and educate multiple times however patient turned around the opposite way, keeping head cover and continue not to engage. She is inconsistent with medication compliance. Patient refused scheduled medication last night, refused Zyprexa this morning but did take Prozac. She continues to be evasive, isolative and suspicious. Patient has breast-feeding pump available however not being utilized by patient. She is encouraged to comply with scheduled medication and group programming. Patient warrants further hospitalization due to inability to contract for safety on the community.     Appetite:  [] Adequate/Unchanged  [] Increased  [x] Decreased      Sleep:       [] Adequate/Unchanged  [] Fair  [x] Poor      Group Attendance on Unit:   [] Yes   [] Selectively    [x] No    Compliant with scheduled medications: [] Yes  [x] No    Received emergency medications in past 24 hrs: [] Yes   [x] No    Medication Side Effects: Denies          Mental Status Exam  Level of consciousness: Alert and awake   Appearance: Appropriate attire for setting, resting in bed, with poor grooming and hygiene   Behavior/Motor: Approachable, restlessness resting in bed with head covered with white sheet  Attitude toward examiner: No cooperation and concentration, no eye contact  Speech: No speech, unwilling to engage   Mood: Depressed  Affect: Mood congruent  Thought processes: slow, illogical, thought blocking, and incoherent   Thought content: Unknown if homicidal ideation  Suicidal Ideation: Unknown if improvement suicidal ideations, unable to contracts for safety off the unit. Delusions: No evidence of delusions. Perceptual Disturbance: Patient does appear to be responding to internal stimuli. Cognition: Unable to assess, patient unwilling to engage  Memory: impaired   Insight: Absent  Judgement: Absent    Data   height is 5' 2\" (1.575 m) and weight is 115 lb (52.2 kg). Her oral temperature is 98.2 °F (36.8 °C). Her blood pressure is 125/99 (abnormal) and her pulse is 100. Her respiration is 14 and oxygen saturation is 96%. Labs:   Admission on 10/30/2022   Component Date Value Ref Range Status    WBC 10/30/2022 6.3  3.5 - 11.0 k/uL Final    RBC 10/30/2022 5.14  4.0 - 5.2 m/uL Final    Hemoglobin 10/30/2022 15.0  12.0 - 16.0 g/dL Final    Hematocrit 10/30/2022 45.8  36 - 46 % Final    MCV 10/30/2022 89.0  80 - 100 fL Final    MCH 10/30/2022 29.2  26 - 34 pg Final    MCHC 10/30/2022 32.8  31 - 37 g/dL Final    RDW 10/30/2022 13.6  11.5 - 14.9 % Final    Platelets 29/96/5538 297  150 - 450 k/uL Final    MPV 10/30/2022 8.7  6.0 - 12.0 fL Final    Seg Neutrophils 10/30/2022 74 (A)  36 - 66 % Final    Lymphocytes 10/30/2022 19 (A)  24 - 44 % Final    Monocytes 10/30/2022 7  1 - 7 % Final    Eosinophils % 10/30/2022 0  0 - 4 % Final    Basophils 10/30/2022 0  0 - 2 % Final    Segs Absolute 10/30/2022 4.60  1.3 - 9.1 k/uL Final    Absolute Lymph # 10/30/2022 1.20  1.0 - 4.8 k/uL Final    Absolute Mono # 10/30/2022 0.40  0.1 - 1.3 k/uL Final    Absolute Eos # 10/30/2022 0.00  0.0 - 0.4 k/uL Final    Basophils Absolute 10/30/2022 0.00  0.0 - 0.2 k/uL Final    Glucose 10/30/2022 104 (A)  70 - 99 mg/dL Final    BUN 10/30/2022 13  6 - 20 mg/dL Final    Creatinine 10/30/2022 0.59  0.50 - 0.90 mg/dL Final    Est, Glom Filt Rate 10/30/2022 >60  >60 mL/min/1.73m2 Final    Comment:       Effective Oct 3, 2022        These results are not intended for use in patients <25years of age.         eGFR results are calculated without a race factor using the 2021 CKD-EPI equation. Careful clinical correlation is recommended, particularly when comparing to results   calculated using previous equations. The CKD-EPI equation is less accurate in patients with extremes of muscle mass, extra-renal   metabolism of creatine, excessive creatine ingestion, or following therapy that affects   renal tubular secretion. Calcium 10/30/2022 9.5  8.6 - 10.4 mg/dL Final    Sodium 10/30/2022 140  135 - 144 mmol/L Final    Potassium 10/30/2022 3.9  3.7 - 5.3 mmol/L Final    Chloride 10/30/2022 102  98 - 107 mmol/L Final    CO2 10/30/2022 21  20 - 31 mmol/L Final    Anion Gap 10/30/2022 17  9 - 17 mmol/L Final    Alkaline Phosphatase 10/30/2022 99  35 - 104 U/L Final    ALT 10/30/2022 9  5 - 33 U/L Final    AST 10/30/2022 14  <32 U/L Final    Total Bilirubin 10/30/2022 0.5  0.3 - 1.2 mg/dL Final    Total Protein 10/30/2022 8.2  6.4 - 8.3 g/dL Final    Albumin 10/30/2022 5.2  3.5 - 5.2 g/dL Final    Ethanol 10/30/2022 <10  <10 mg/dL Final    Ethanol percent 10/30/2022 <0.010  % Final    hCG Qual 10/30/2022 NEGATIVE  NEGATIVE Final    Comment: Specimens with hCG levels near the threshold of the test (25 mIU/mL) may give a negative or   indeterminate result. In such cases, another test should be performed with a new specimen   in 48-72 hours. If early pregnancy is suspected clinically in this setting, correlation   with quantitative serum b-hCG level is suggested. TSH 10/31/2022 1.53  0.30 - 5.00 uIU/mL Final         Reviewed patient's current plan of care and vital signs with nursing staff.     Labs reviewed: [x] Yes    Medications  Current Facility-Administered Medications: FLUoxetine (PROZAC) capsule 10 mg, 10 mg, Oral, Daily  levothyroxine (SYNTHROID) tablet 50 mcg, 50 mcg, Oral, Daily  ferrous sulfate (IRON 325) tablet 325 mg, 325 mg, Oral, BID  OLANZapine zydis (ZYPREXA) disintegrating tablet 5 mg, 5 mg, Oral, Nightly  haloperidol lactate (HALDOL) injection 5 mg, 5 mg, IntraMUSCular, Q6H PRN **AND** LORazepam (ATIVAN) injection 2 mg, 2 mg, IntraMUSCular, Q6H PRN **AND** diphenhydrAMINE (BENADRYL) injection 50 mg, 50 mg, IntraMUSCular, Q6H PRN  acetaminophen (TYLENOL) tablet 650 mg, 650 mg, Oral, Q6H PRN  ibuprofen (ADVIL;MOTRIN) tablet 400 mg, 400 mg, Oral, Q6H PRN  hydrOXYzine HCl (ATARAX) tablet 50 mg, 50 mg, Oral, TID PRN  traZODone (DESYREL) tablet 50 mg, 50 mg, Oral, Nightly PRN  polyethylene glycol (GLYCOLAX) packet 17 g, 17 g, Oral, Daily PRN  aluminum & magnesium hydroxide-simethicone (MAALOX) 200-200-20 MG/5ML suspension 30 mL, 30 mL, Oral, Q6H PRN  nicotine polacrilex (NICORETTE) gum 2 mg, 2 mg, Oral, Q2H PRN    ASSESSMENT  Major depressive disorder, recurrent, severe with psychotic features ([facility] [address]         HANDOFF  Patient symptoms are: No change  Medications as determined by attending physician  Encourage compliance with oral medication   Encourage participation in groups and milieu. Probable discharge is to be determined by MD    Electronically signed by RON De Leon CNP on 11/3/2022 at 3:14 PM    **This report has been created using voice recognition software. It may contain minor errors which are inherent in voice recognition technology. **     I independently saw and evaluated the patient. I reviewed the nurse practitioners documentation above. Any additional comments or changes to the nurse practitioners documentation are stated below otherwise agree with assessment. Plan will be as follows:  Patient still psychotic, minimal participation in milieu. No explanation for intermittent compliance with medication. Sad and depressed. Responding to internal stimuli. PLAN  Patient s symptoms   show no change  Encourage oral compliance with medication  Attempt to develop insight  Psycho-education conducted. Supportive Therapy conducted.   Probable discharge is undetermined at this time  Follow-up daily while on inpatient unit

## 2022-11-03 NOTE — PLAN OF CARE
Problem: Depression  Goal: Will be euthymic at discharge  Description: INTERVENTIONS:  1. Administer medication as ordered  2. Provide emotional support via 1:1 interaction with staff  3. Encourage involvement in milieu/groups/activities  4. Monitor for social isolation  Outcome: Progressing,  Patient tearful not answering when asked whether she's depressed or not. Patient took AM scheduled medications. Problem: Psychosis  Goal: Will report no hallucinations or delusions  Description: INTERVENTIONS:  1. Administer medication as  ordered  2. Assist with reality testing to support increasing orientation  3. Assess if patient's hallucinations or delusions are encouraging self harm or harm to others and intervene as appropriate  Outcome: Progressing, Patient encouraged to report any hallucinations. Problem: Sleep Disturbance  Goal: Will exhibit normal sleeping pattern  Description: INTERVENTIONS:  1. Administer medication as ordered  2. Decrease environmental stimuli, including noise, as appropriate  3. Discourage social isolation and naps during the day  Outcome: Progressing, patient isolative most of the day; sleeping on/off.15 MIN safety checks.

## 2022-11-03 NOTE — GROUP NOTE
Psych-Ed/Relapse Prevention Group Note        Date: November 3, 2022 Start Time: 11am  End Time: 11:45am      Number of Participants in Group & Unit Census:  8    Topic: Socialization     Goal of Group:Patient will demonstrate improved interpersonal skills      Comments:     Patient did not participate in Psych-Ed/Relapse Prevention group, despite staff encouragement and explanation of benefits. Patient remain seclusive to self. Q15 minute safety checks maintained for patient safety and will continue to encourage patient to attend unit programming.        Signature:  Torrey Colunga

## 2022-11-03 NOTE — GROUP NOTE
Group Therapy Note    Date: 11/2/2022    Group Start Time: 2000  Group End Time: 2030  Group Topic: Relaxation    [facility]    Lorena Cole RN        Group Therapy Note    Attendees: 7  patient refused to attend relaxation group at 8 pm after encouragement from staff.   1:1 talk time was offered and refused as an alternative to group session          Signature:  Lorena Cole RN

## 2022-11-04 PROCEDURE — 6360000002 HC RX W HCPCS: Performed by: PSYCHIATRY & NEUROLOGY

## 2022-11-04 PROCEDURE — APPSS30 APP SPLIT SHARED TIME 16-30 MINUTES

## 2022-11-04 PROCEDURE — 1240000000 HC EMOTIONAL WELLNESS R&B

## 2022-11-04 RX ADMIN — DIPHENHYDRAMINE HYDROCHLORIDE 50 MG: 50 INJECTION, SOLUTION INTRAMUSCULAR; INTRAVENOUS at 08:43

## 2022-11-04 RX ADMIN — HALOPERIDOL LACTATE 5 MG: 5 INJECTION, SOLUTION INTRAMUSCULAR at 08:44

## 2022-11-04 RX ADMIN — LORAZEPAM 2 MG: 2 INJECTION INTRAMUSCULAR; INTRAVENOUS at 08:44

## 2022-11-04 NOTE — BH NOTE
Post Restraint and Seclusion Patient Debriefing    Did debriefing occur? No, patient refused debriefing     If No, why not? [x] Patient refused  [] Patient is unavailable for debriefing due to:  [] Patient debriefing not completed due to clinical contraindication of:    What events led to the seclusion/restraint incident? Did being restrained or in seclusion help you regain control of your behavior? JOHNNY: patient refused debrief       Did you feel safe while you were in restraint or seclusion:      [] Very Safe  [] Safe  [] Somewhat Safe  [] Not Safe   JOHNNY: patient refused debrief     Did you have the chance to gain control of your behavior before you were secluded or restrained? JOHNNY: patient refused debrief     If Yes, how:    [x] Offered Medication  [x] Talked with  [] Given Time Out      [] Offered alternatives to restraint/seclusion     During the restraint or seclusion process were you offered medicine to help you gain control? JOHNNY: patient refused debrief       Were your physical and emotional needs met, and your privacy rights addressed while you were in restraints/seclusion? JOHNNY: patient refused debrief       How can we assist you in remaining restraint or seclusion free in the future? JOHNNY: patient refused debrief       Is there anything else you would like to share regarding this restraint/seclusion episode? JOHNNY: patient refused debrief     Patient consented to family or significant other to participate in debriefing no    Patient's guardian participated in debriefing (when applicable) no    Patient's guardian unable to participate in debriefing (when applicable)     Staff: Were modifications to the treatment plan completed?  yes

## 2022-11-04 NOTE — PLAN OF CARE
Problem: Psychosis  Goal: Will report no hallucinations or delusions  Description: INTERVENTIONS:  1. Administer medication as  ordered  2. Assist with reality testing to support increasing orientation  3. Assess if patient's hallucinations or delusions are encouraging self harm or harm to others and intervene as appropriate  11/3/2022 2227 by Jayshree Godoy LPN  Note: Patient denies suicidal thoughts and hallucinations. She came up to the desk and stated \"I figured out what's wrong with me, I am more than 1 person. \" When writer asked her to elaborate she became religiously preoccupied and began talking about figuring out her and her husbands Jew. Patient was offered her evening medications and only took her iron. She was encouraged to take her zyprexa but stated she won't take it until she speaks with the doctor. She did attend group and was out in the dayroom and social with peers. Q15min safety checks continue     Problem: Sleep Disturbance  Goal: Will exhibit normal sleeping pattern  Description: INTERVENTIONS:  1. Administer medication as ordered  2. Decrease environmental stimuli, including noise, as appropriate  3. Discourage social isolation and naps during the day  11/3/2022 2227 by Jayshree Godoy LPN  Note: Patient was up more this evening, attended group and was social with peers. She did state that she is feeling exhausted but doesn't understand why.

## 2022-11-04 NOTE — BH NOTE
Initiation and Discontinuation of Transitional Hold     Patient, Araceli Tracy. in room 127, reported to writer that she couldn't get into room due to another patient blocking door way. Writer and BRADEN walked down to room together and met patient. Writer told patient that she cannot block doorway. With help of psychoeducation specialist, patient was guided to assigned room. During this time patient was offered medication, 1:1 talk time, and in time in a quiet room. Patients response was to lower self to floor and refuse offers. [facility] assist called. Staff members attempted to guide patient to room. Transitional hold initiated due to patient refusing to take medication in room, and attempting to escape staff. Transitional hold started from 0852 to [phone] for 50 seconds to take patient from hallway to quiet room. During transitional hold at 0852 PRN medications were given. At 0852 Benadryl 50 mg IM, Ativan 2 mg IM, and Haldol 5 mg IM. Advocate present, and physician notified.

## 2022-11-04 NOTE — PROGRESS NOTES
Daily Progress Note  11/4/2022    Patient Name: Karlo Montgomery:  Depression with suicidal ideation and psychosis          SUBJECTIVE:      Patient is seen today for a follow up assessment. Patient is not compliant with scheduled medications. She did receive IM emergency Haldol, Ativan and Benadryl today and needed to be placed in seclusion. Per nursing documentation patient was blocking the doorway of another patient's room. Patient was asked to remove however refused initially. With much coaxing patient finally moved however continuously lowered self to the floor and refused to get up. Patient needed emergency medications at this time however was not compliant and to transitional hold was needed for patient to comply. Upon assessment today patient was still laying in the seclusion room with the doors unlocked. She was somnolent and was unable to be aroused by verbal stimuli. Due to patient's somnolence interview was unable to be completed at this time. Patient is yet to show stability of symptoms. She continues to require emergency medications to remain behaviorally controlled. Patient continues to warrant further hospitalization for safety and stability. Appetite: Unable to assess due to patient receiving emergency medications and becoming somnolent    Sleep:       Unable to assess due to patient receiving emergency medications and becoming somnolent    Group Attendance on Unit:  No    Medication Side Effects: Patient is noncompliant with scheduled medications. Mental Status Exam  Level of consciousness: Somnolent  Appearance: Appropriate attire for setting, resting in bed in seclusion room, with poor grooming and hygiene.    Behavior/Motor: Sleeping  Attitude toward examiner: Sleeping  Speech: Selectively mute due to sleeping  Mood:  Unable to assess due to patient receiving emergency medications and becoming somnolent  Affect: Unable to assess due to patient receiving emergency medications and becoming somnolent  Thought processes: Unable to assess due to patient receiving emergency medications and becoming somnolent  Thought content: Unable to assess due to patient receiving emergency medications and becoming somnolent  Suicidal Ideation: Unable to assess due to patient receiving emergency medications and becoming somnolent  Delusions: Continues to present with bizarre and paranoid delusions  Perceptual Disturbance: Unable to assess due to patient receiving emergency medications and becoming somnolent  Cognition: Unable to assess due to patient receiving emergency medications and becoming somnolent  Memory: Unable to assess due to patient receiving emergency medications and becoming somnolent  Insight & Judgement: Poor to limited    Data   height is 5' 2\" (1.575 m) and weight is 115 lb (52.2 kg). Her temporal temperature is 98.1 °F (36.7 °C). Her blood pressure is 115/77 and her pulse is 85. Her respiration is 14 and oxygen saturation is 96%.    Labs:   Admission on 10/30/2022   Component Date Value Ref Range Status    WBC 10/30/2022 6.3  3.5 - 11.0 k/uL Final    RBC 10/30/2022 5.14  4.0 - 5.2 m/uL Final    Hemoglobin 10/30/2022 15.0  12.0 - 16.0 g/dL Final    Hematocrit 10/30/2022 45.8  36 - 46 % Final    MCV 10/30/2022 89.0  80 - 100 fL Final    MCH 10/30/2022 29.2  26 - 34 pg Final    MCHC 10/30/2022 32.8  31 - 37 g/dL Final    RDW 10/30/2022 13.6  11.5 - 14.9 % Final    Platelets 80/46/9391 297  150 - 450 k/uL Final    MPV 10/30/2022 8.7  6.0 - 12.0 fL Final    Seg Neutrophils 10/30/2022 74 (A)  36 - 66 % Final    Lymphocytes 10/30/2022 19 (A)  24 - 44 % Final    Monocytes 10/30/2022 7  1 - 7 % Final    Eosinophils % 10/30/2022 0  0 - 4 % Final    Basophils 10/30/2022 0  0 - 2 % Final    Segs Absolute 10/30/2022 4.60  1.3 - 9.1 k/uL Final    Absolute Lymph # 10/30/2022 1.20  1.0 - 4.8 k/uL Final    Absolute Mono # 10/30/2022 0.40  0.1 - 1.3 k/uL Final    Absolute Eos # 10/30/2022 0.00  0.0 - 0.4 k/uL Final    Basophils Absolute 10/30/2022 0.00  0.0 - 0.2 k/uL Final    Glucose 10/30/2022 104 (A)  70 - 99 mg/dL Final    BUN 10/30/2022 13  6 - 20 mg/dL Final    Creatinine 10/30/2022 0.59  0.50 - 0.90 mg/dL Final    Est, Glom Filt Rate 10/30/2022 >60  >60 mL/min/1.73m2 Final    Comment:       Effective Oct 3, 2022        These results are not intended for use in patients <25years of age. eGFR results are calculated without a race factor using the 2021 CKD-EPI equation. Careful clinical correlation is recommended, particularly when comparing to results   calculated using previous equations. The CKD-EPI equation is less accurate in patients with extremes of muscle mass, extra-renal   metabolism of creatine, excessive creatine ingestion, or following therapy that affects   renal tubular secretion. Calcium 10/30/2022 9.5  8.6 - 10.4 mg/dL Final    Sodium 10/30/2022 140  135 - 144 mmol/L Final    Potassium 10/30/2022 3.9  3.7 - 5.3 mmol/L Final    Chloride 10/30/2022 102  98 - 107 mmol/L Final    CO2 10/30/2022 21  20 - 31 mmol/L Final    Anion Gap 10/30/2022 17  9 - 17 mmol/L Final    Alkaline Phosphatase 10/30/2022 99  35 - 104 U/L Final    ALT 10/30/2022 9  5 - 33 U/L Final    AST 10/30/2022 14  <32 U/L Final    Total Bilirubin 10/30/2022 0.5  0.3 - 1.2 mg/dL Final    Total Protein 10/30/2022 8.2  6.4 - 8.3 g/dL Final    Albumin 10/30/2022 5.2  3.5 - 5.2 g/dL Final    Ethanol 10/30/2022 <10  <10 mg/dL Final    Ethanol percent 10/30/2022 <0.010  % Final    hCG Qual 10/30/2022 NEGATIVE  NEGATIVE Final    Comment: Specimens with hCG levels near the threshold of the test (25 mIU/mL) may give a negative or   indeterminate result. In such cases, another test should be performed with a new specimen   in 48-72 hours. If early pregnancy is suspected clinically in this setting, correlation   with quantitative serum b-hCG level is suggested.       TSH 10/31/2022 1.53  0.30 - 5. 00 uIU/mL Final         Reviewed patient's current plan of care and vital signs with nursing staff. Labs reviewed: [x] Yes      Medications  Current Facility-Administered Medications: FLUoxetine (PROZAC) capsule 10 mg, 10 mg, Oral, Daily  levothyroxine (SYNTHROID) tablet 50 mcg, 50 mcg, Oral, Daily  ferrous sulfate (IRON 325) tablet 325 mg, 325 mg, Oral, BID  OLANZapine zydis (ZYPREXA) disintegrating tablet 5 mg, 5 mg, Oral, Nightly  haloperidol lactate (HALDOL) injection 5 mg, 5 mg, IntraMUSCular, Q6H PRN **AND** LORazepam (ATIVAN) injection 2 mg, 2 mg, IntraMUSCular, Q6H PRN **AND** diphenhydrAMINE (BENADRYL) injection 50 mg, 50 mg, IntraMUSCular, Q6H PRN  acetaminophen (TYLENOL) tablet 650 mg, 650 mg, Oral, Q6H PRN  ibuprofen (ADVIL;MOTRIN) tablet 400 mg, 400 mg, Oral, Q6H PRN  hydrOXYzine HCl (ATARAX) tablet 50 mg, 50 mg, Oral, TID PRN  traZODone (DESYREL) tablet 50 mg, 50 mg, Oral, Nightly PRN  polyethylene glycol (GLYCOLAX) packet 17 g, 17 g, Oral, Daily PRN  aluminum & magnesium hydroxide-simethicone (MAALOX) 200-200-20 MG/5ML suspension 30 mL, 30 mL, Oral, Q6H PRN  nicotine polacrilex (NICORETTE) gum 2 mg, 2 mg, Oral, Q2H PRN    ASSESSMENT  Major depressive disorder, recurrent, severe with psychotic features ([facility] [address]         HANDOFF  Patient symptoms are: Remains Unstable. Medications as determined by attending physician  Continue to encourage oral compliance  Monitor need and frequency of PRN medications. Encourage participation in groups and milieu. Probable discharge is to be determined by MD.     Electronically signed by RON Sanders CNP on 11/4/2022 at 2:42 PM    **This report has been created using voice recognition software. It may contain minor errors which are inherent in voice recognition technology. **    I independently saw and evaluated the patient. I reviewed the nurse practitioners documentation above.   Any additional comments or changes to the nurse practitioners documentation are stated below otherwise agree with assessment. Plan will be as follows:  Patient still noncompliant with medication. Was in seclusion room on a voluntary basis after requiring emergency IM injections due to combativeness. Reportedly still responding to internal stimuli and not able to engage in sustained meaningful conversation with her  PLAN  Patient s symptoms   show no change  Encourage oral compliance  Attempt to develop insight  Psycho-education conducted. Supportive Therapy conducted.   Probable discharge is undetermined at this time  Follow-up daily while on inpatient unit

## 2022-11-04 NOTE — GROUP NOTE
Psych-Ed/Relapse Prevention Group Note        Date: November 4, 2022 Start Time: 11am  End Time: 11:45am      Number of Participants in Group & Unit Census:  8    Topic: Communication and socialization    Goal of Group:Patient will identify benefits of healthy communication and listening. Comments:     Patient did not participate in Psych-Ed/Relapse Prevention group, despite staff encouragement and explanation of benefits. Patient remain seclusive to self. Q15 minute safety checks maintained for patient safety and will continue to encourage patient to attend unit programming.          Signature:  Manasa Aguirre, [address]

## 2022-11-04 NOTE — PLAN OF CARE
Problem: Depression  Goal: Will be euthymic at discharge  Description: INTERVENTIONS:  1. Administer medication as ordered  2. Provide emotional support via 1:1 interaction with staff  3. Encourage involvement in milieu/groups/activities  4. Monitor for social isolation  Outcome: Progressing,Patient tearful, main focus has been wanting to be discharged. Patient encouraged to seek staff for 1:1 talk time. Patient need encouragement during meals, 25- 50%     Problem: Psychosis  Goal: Will report no hallucinations or delusions  Description: INTERVENTIONS:  1. Administer medication as  ordered  2. Assist with reality testing to support increasing orientation  3. Assess if patient's hallucinations or delusions are encouraging self harm or harm to others and intervene as appropriate  Outcome: Progressing,  Patient anxious need redirecting,however able to have a clear conversation. Problem: Sleep Disturbance  Goal: Will exhibit normal sleeping pattern  Description: INTERVENTIONS:  1. Administer medication as ordered  2. Decrease environmental stimuli, including noise, as appropriate  3.  Discourage social isolation and naps during the day  Outcome: Progressing, Patient sleeps off and on, patient aware that Sleep Aid medication available at bed time

## 2022-11-04 NOTE — GROUP NOTE
Psych-Ed/Relapse Prevention Group Note        Date: November 4, 2022 Start Time: 1:30pm  End Time: 2:30pm      Number of Participants in Group & Unit Census:  6    Topic: Coping skills    Goal of Group:Patient will identify benefits of creative expression for coping      Comments:     Patient did not participate in Psych-Ed/Relapse Prevention group, despite staff encouragement and explanation of benefits. Patient remain seclusive to self. Q15 minute safety checks maintained for patient safety and will continue to encourage patient to attend unit programming.          Signature:  Manasa Aguirre, [address]

## 2022-11-04 NOTE — BH NOTE
Patient was offered evening medications, however only took her iron pill. She was encouraged to take her zyprexa but stated she won't take it until she speaks with the doctor.

## 2022-11-04 NOTE — BH NOTE
BEHAVIORAL SERVICES: One - Hour In- Person Review  For Management of Violent or Self - Destructive Behavior    Seclusion/Restraint:  Transitional hold ([id number] for 50 seconds)    Reason for Intervention: Patient was wandering in to peers rooms and barring entry. Patient placed self on floor and refused to walk unassisted to her room. Patient refused verbal redirection and medications. Patient became combative when walking to room and required transitional hold to transport her to quiet room. Response to Intervention:  Patient received as needed medications and regained behavioral control. Medical Record reviewed and discussed precipitating events/behaviors with RN initiating Intervention:  Yes    Patient Medical Status:  Vital Signs: JOHNNY due to patient behaviors  Respiratory Status: Even and non labored respirations  Circulatory Status: JOHNNY due to patient behaviors  Skin Integrity:JOHNNY due to patient behaviors    Orientation: oriented to person, place, time/date, and situation    Mood/Affect: anxious and labile    Speech: Pressured    Thought Content: normal    Thought Processes: Goal-Directed    Rationale for continued use of intervention: Patient received medications and intervention was discontinued. Rationale for discontinuing intervention: Patient is able to:Patient received medications and intervention was discontinued.     One Hour Review Evaluation Physician Notification:  completed

## 2022-11-04 NOTE — BH NOTE
Emergency Medication Follow-Up Note:    PRN medication of Benadryl 50 mg, Haldol 5 mg, and Ativan 2 mg IM injections was effective as evidence by resting with eyes closed,non labored breathing and absence of behavior warranting emergency medication, Patient denies medication side effects. Will continue to monitor and provide support as needed.

## 2022-11-05 PROCEDURE — 1240000000 HC EMOTIONAL WELLNESS R&B

## 2022-11-05 PROCEDURE — APPSS30 APP SPLIT SHARED TIME 16-30 MINUTES

## 2022-11-05 NOTE — GROUP NOTE
Group Therapy Note    Date: 11/5/2022    Group Start Time: 1100  Group End Time: 1140  Group Topic: Cognitive Skills    [facility]    Pedro Ceron, [address]        Group Therapy Note    Attendees: 6/13    Cognitive Skills Group Note        Date: November 5, 2022 Start Time: 11am  End Time: 11:40am      Number of Participants in Group & Unit Census:  6/13    Topic:  creative thinking, concentration, communication     Goal of Group: To improve creative thinking and concentration through collaborating with peers and focusing on a presented task. Comments:     Patient did not participate in Cognitive Skills group, despite staff encouragement and explanation of benefits. Patient remain seclusive to self. Q15 minute safety checks maintained for patient safety and will continue to encourage patient to attend unit programming.         Signature:  JOANNE ChapmanS

## 2022-11-05 NOTE — PLAN OF CARE
Problem: Depression  Goal: Will be euthymic at discharge  Description: INTERVENTIONS:  1. Administer medication as ordered  2. Provide emotional support via 1:1 interaction with staff  3. Encourage involvement in milieu/groups/activities  4. Monitor for social isolation  11/4/2022 2325 by Eden Barbour LPN  Note: Patient didn't answer my questions. She seemed irritated and didn't want to be bothered, but from observing she did seem depressed and anxious. Refused meds as well. Q15 min safety check. Problem: Psychosis  Goal: Will report no hallucinations or delusions  Description: INTERVENTIONS:  1. Administer medication as  ordered  2. Assist with reality testing to support increasing orientation  3. Assess if patient's hallucinations or delusions are encouraging self harm or harm to others and intervene as appropriate  11/4/2022 2325 by Eden Barbour LPN  Note: Patient didn't answer my questions. She seemed irritated and didn't want to be bothered, but from observing she did seem depressed and anxious. Refused meds as well. No hallucinations seemed present at the time. Patient is asleep in room. Q15 min safety check.

## 2022-11-05 NOTE — GROUP NOTE
Group Therapy Note    Date: 11/4/2022    Group Start Time: 2000  Group End Time: 2030  Group Topic: Recreational    [facility]    Kelly Roibn RN        Group Therapy Note    Attendees: 4  patient refused to attend recreational group at 8 pm after encouragement from staff.   1:1 talk time was offered and declined  as alternative to group session          Signature:  Kelly Robin RN

## 2022-11-05 NOTE — BH NOTE
Writer explained the medications that were due to patient and she was asking for something to help her sleep. Writer again, explained them to her and offered them and explained the importance of taking them, but patient was not ok with taking them until she speaks with a doctor. Patient did refuse the medications tonight.

## 2022-11-05 NOTE — PLAN OF CARE
Problem: Depression  Goal: Will be euthymic at discharge  Description: INTERVENTIONS:  1. Administer medication as ordered  2. Provide emotional support via 1:1 interaction with staff  3. Encourage involvement in milieu/groups/activities  4. Monitor for social isolation  11/5/2022 0913 by Arcadio Whaley RN  Outcome: Progressing     Problem: Psychosis  Goal: Will report no hallucinations or delusions  Description: INTERVENTIONS:  1. Administer medication as  ordered  2. Assist with reality testing to support increasing orientation  3. Assess if patient's hallucinations or delusions are encouraging self harm or harm to others and intervene as appropriate  11/5/2022 0913 by Arcadio Whaely RN  Outcome: Progressing     Problem: Sleep Disturbance  Goal: Will exhibit normal sleeping pattern  Description: INTERVENTIONS:  1. Administer medication as ordered  2. Decrease environmental stimuli, including noise, as appropriate  3. Discourage social isolation and naps during the day  Outcome: Progressing     Problem: Safety - Violent/Self-destructive Restraint  Goal: Remains free of injury from restraints (Restraint for Violent/Self-Destructive Behavior)  Description: INTERVENTIONS:  1. Determine that de-escalation and other, less restrictive measures have been tried or would not be effective before applying the restraint  2. Identify and document the criteria for restraint  3. Evaluate the patient's condition at the time of restraint application  4. Inform patient/family regarding the reason for restraint/seclusion  5. Q2H: Monitor comfort, nutrition and hydration needs  6. Q15M: Perform safety checks including skin, circulation, sensory, respiratory and psychological status  7. Ensure continuous observation  8.  Identify and implement measures to help patient regain control, assess readiness for release and initiate progressive release per policy  Outcome: Progressing     Patient denies thoughts of self harm or thoughts to harm others. Patient admits to hallucinations of hearing and seeing \"people\" patient reports good sleep. Patient remains free of restraints. Patient cooperative but continues to refuse medication ordered for her. Patient isolates to room and continues to display a flat affect.

## 2022-11-05 NOTE — PROGRESS NOTES
Daily Progress Note  11/5/2022    Patient Name: Patricia Maldonado:  Depression with suicidal ideation and psychosis          SUBJECTIVE:      Patient is seen today for a follow up assessment. Patient continues to be noncompliant with scheduled medications. She has remained behaviorally in control since yesterday morning after receiving emergency medications and being in seclusion voluntarily. Nursing staff report that Chantell Goode has largely been ignoring staff today and not being compliant with medications. She did mention to one nurse that she is hearing voices today. This writer attempted to see Chantell Goode in her room. She was laying in bed in the fetal position with the blankets covering her entire face and body. She initially did not respond to writer however after writer identified herself, patient states, \"I am good, I don't need anything, thank you. \"  This writer attempted to engage patient in further conversation however patient ignored this writer. Due to patient being unwilling to engage in assessment this interview was terminated. Patient continues to appear extremely paranoid. She is not caring for herself. She would be extremely unsafe out of the hospital at this time and continues to require inpatient hospitalization for safety and stability. Appetite: Unable to assess due to patient's unwillingness to cooperate with assessment    Sleep:       Unable to assess due to patient's unwillingness to cooperate with assessment    Group Attendance on Unit:  No    Medication Side Effects: Patient is noncompliant with scheduled medications. Mental Status Exam  Level of consciousness: Somnolent but responds to verbal stimuli  Appearance: Appropriate attire for setting, resting in bed with blankets covering entire body and head with poor grooming and hygiene.    Behavior/Motor: Non-cooperative, ignoring writer  Attitude toward examiner: Non-cooperative, no eye contact   Speech: Selectively mute   Mood:  Unable to assess due to patient's unwillingness to cooperate with assessment  Affect: Unable to assess due to patient's unwillingness to cooperate with assessment  Thought processes: Unable to assess due to patient's unwillingness to cooperate with assessment  Thought content: Unable to assess due to patient's unwillingness to cooperate with assessment  Suicidal Ideation: Unable to assess due to patient's unwillingness to cooperate with assessment  Delusions: Continues to present with bizarre and paranoid delusions  Perceptual Disturbance: Unable to assess due to patient's unwillingness to cooperate with assessment. Appears to be responding per nursing staff  Cognition: Unable to assess due to patient's unwillingness to cooperate with assessment  Memory: Unable to assess due to patient's unwillingness to cooperate with assessment  Insight & Judgement: Poor to limited    Data   height is 5' 2\" (1.575 m) and weight is 115 lb (52.2 kg). Her oral temperature is 98.3 °F (36.8 °C). Her blood pressure is 108/53 (abnormal) and her pulse is 82. Her respiration is 14 and oxygen saturation is 96%.    Labs:   Admission on 10/30/2022   Component Date Value Ref Range Status    WBC 10/30/2022 6.3  3.5 - 11.0 k/uL Final    RBC 10/30/2022 5.14  4.0 - 5.2 m/uL Final    Hemoglobin 10/30/2022 15.0  12.0 - 16.0 g/dL Final    Hematocrit 10/30/2022 45.8  36 - 46 % Final    MCV 10/30/2022 89.0  80 - 100 fL Final    MCH 10/30/2022 29.2  26 - 34 pg Final    MCHC 10/30/2022 32.8  31 - 37 g/dL Final    RDW 10/30/2022 13.6  11.5 - 14.9 % Final    Platelets 87/35/2769 297  150 - 450 k/uL Final    MPV 10/30/2022 8.7  6.0 - 12.0 fL Final    Seg Neutrophils 10/30/2022 74 (A)  36 - 66 % Final    Lymphocytes 10/30/2022 19 (A)  24 - 44 % Final    Monocytes 10/30/2022 7  1 - 7 % Final    Eosinophils % 10/30/2022 0  0 - 4 % Final    Basophils 10/30/2022 0  0 - 2 % Final    Segs Absolute 10/30/2022 4.60  1.3 - 9.1 k/uL Final Absolute Lymph # 10/30/2022 1.20  1.0 - 4.8 k/uL Final    Absolute Mono # 10/30/2022 0.40  0.1 - 1.3 k/uL Final    Absolute Eos # 10/30/2022 0.00  0.0 - 0.4 k/uL Final    Basophils Absolute 10/30/2022 0.00  0.0 - 0.2 k/uL Final    Glucose 10/30/2022 104 (A)  70 - 99 mg/dL Final    BUN 10/30/2022 13  6 - 20 mg/dL Final    Creatinine 10/30/2022 0.59  0.50 - 0.90 mg/dL Final    Est, Glom Filt Rate 10/30/2022 >60  >60 mL/min/1.73m2 Final    Comment:       Effective Oct 3, 2022        These results are not intended for use in patients <25years of age. eGFR results are calculated without a race factor using the 2021 CKD-EPI equation. Careful clinical correlation is recommended, particularly when comparing to results   calculated using previous equations. The CKD-EPI equation is less accurate in patients with extremes of muscle mass, extra-renal   metabolism of creatine, excessive creatine ingestion, or following therapy that affects   renal tubular secretion. Calcium 10/30/2022 9.5  8.6 - 10.4 mg/dL Final    Sodium 10/30/2022 140  135 - 144 mmol/L Final    Potassium 10/30/2022 3.9  3.7 - 5.3 mmol/L Final    Chloride 10/30/2022 102  98 - 107 mmol/L Final    CO2 10/30/2022 21  20 - 31 mmol/L Final    Anion Gap 10/30/2022 17  9 - 17 mmol/L Final    Alkaline Phosphatase 10/30/2022 99  35 - 104 U/L Final    ALT 10/30/2022 9  5 - 33 U/L Final    AST 10/30/2022 14  <32 U/L Final    Total Bilirubin 10/30/2022 0.5  0.3 - 1.2 mg/dL Final    Total Protein 10/30/2022 8.2  6.4 - 8.3 g/dL Final    Albumin 10/30/2022 5.2  3.5 - 5.2 g/dL Final    Ethanol 10/30/2022 <10  <10 mg/dL Final    Ethanol percent 10/30/2022 <0.010  % Final    hCG Qual 10/30/2022 NEGATIVE  NEGATIVE Final    Comment: Specimens with hCG levels near the threshold of the test (25 mIU/mL) may give a negative or   indeterminate result. In such cases, another test should be performed with a new specimen   in 48-72 hours.   If early pregnancy is suspected clinically in this setting, correlation   with quantitative serum b-hCG level is suggested. TSH 10/31/2022 1.53  0.30 - 5.00 uIU/mL Final         Reviewed patient's current plan of care and vital signs with nursing staff. Labs reviewed: [x] Yes      Medications  Current Facility-Administered Medications: FLUoxetine (PROZAC) capsule 10 mg, 10 mg, Oral, Daily  levothyroxine (SYNTHROID) tablet 50 mcg, 50 mcg, Oral, Daily  ferrous sulfate (IRON 325) tablet 325 mg, 325 mg, Oral, BID  OLANZapine zydis (ZYPREXA) disintegrating tablet 5 mg, 5 mg, Oral, Nightly  haloperidol lactate (HALDOL) injection 5 mg, 5 mg, IntraMUSCular, Q6H PRN **AND** LORazepam (ATIVAN) injection 2 mg, 2 mg, IntraMUSCular, Q6H PRN **AND** diphenhydrAMINE (BENADRYL) injection 50 mg, 50 mg, IntraMUSCular, Q6H PRN  acetaminophen (TYLENOL) tablet 650 mg, 650 mg, Oral, Q6H PRN  ibuprofen (ADVIL;MOTRIN) tablet 400 mg, 400 mg, Oral, Q6H PRN  hydrOXYzine HCl (ATARAX) tablet 50 mg, 50 mg, Oral, TID PRN  traZODone (DESYREL) tablet 50 mg, 50 mg, Oral, Nightly PRN  polyethylene glycol (GLYCOLAX) packet 17 g, 17 g, Oral, Daily PRN  aluminum & magnesium hydroxide-simethicone (MAALOX) 200-200-20 MG/5ML suspension 30 mL, 30 mL, Oral, Q6H PRN  nicotine polacrilex (NICORETTE) gum 2 mg, 2 mg, Oral, Q2H PRN    ASSESSMENT  Major depressive disorder, recurrent, severe with psychotic features ([facility] [address]         HANDOFF  Patient symptoms are: Remains Unstable. Medications as determined by attending physician  Continue to encourage oral compliance  Monitor need and frequency of PRN medications. Encourage participation in groups and milieu. Probable discharge is to be determined by MD.     Electronically signed by RON Keller CNP on 11/5/2022 at 2:19 PM    **This report has been created using voice recognition software. It may contain minor errors which are inherent in voice recognition technology. **    I independently saw and evaluated the patient.   I

## 2022-11-06 PROCEDURE — 6370000000 HC RX 637 (ALT 250 FOR IP): Performed by: PSYCHIATRY & NEUROLOGY

## 2022-11-06 PROCEDURE — 1240000000 HC EMOTIONAL WELLNESS R&B

## 2022-11-06 PROCEDURE — APPSS30 APP SPLIT SHARED TIME 16-30 MINUTES

## 2022-11-06 RX ADMIN — FLUOXETINE 10 MG: 10 CAPSULE ORAL at 08:40

## 2022-11-06 RX ADMIN — LEVOTHYROXINE SODIUM 50 MCG: 0.05 TABLET ORAL at 08:40

## 2022-11-06 RX ADMIN — FERROUS SULFATE TAB 325 MG (65 MG ELEMENTAL FE) 325 MG: 325 (65 FE) TAB at 08:40

## 2022-11-06 NOTE — GROUP NOTE
Group Therapy Note    Date: 11/6/2022    Group Start Time: 1330  Group End Time: 6532  Group Topic: Psychoeducation    [facility] Aponte, JOANNES    Group Therapy Note    Attendees: 9       Patient's Goal:  Patient's Goal:  Patient will verbalize constructive feedback related to admission and verbalize understanding of unit guidelines and schedule. Patient will identify weekly goal.      Notes:  Patient attended group and participated. Patient affect is brighter and she is participatory. Patient still appears confused and is unable to identify reality based goal.      Status After Intervention:  Improved    Participation Level:  Active Listener and Interactive    Participation Quality: Appropriate and Attentive      Speech:  hesitant      Thought Process/Content: Flight of ideas      Affective Functioning: Blunted      Mood: depressed      Level of consciousness:  Alert and Attentive      Response to Learning: Able to verbalize current knowledge/experience and Able to verbalize/acknowledge new learning      Endings: None Reported    Modes of Intervention: Education, Support, Socialization, and Exploration      Discipline Responsible: Psychoeducational Specialist      Signature:  Alley Boo, [address]

## 2022-11-06 NOTE — GROUP NOTE
Group Therapy Note    Date: 11/6/2022    Group Start Time: 1030  Group End Time: 1110  Group Topic: Psychoeducation    [facility]    ADRIENNE Salinas        Group Therapy Note    Attendees: 8/17       Patient's Goal:  stepping out of comfort zone     Notes:  therapeutic worksheet provided and discussed     Status After Intervention:  Improved    Participation Level:  Active Listener    Participation Quality: Attentive      Speech:  normal      Thought Process/Content: Logical      Affective Functioning: Flat      Mood: anxious      Level of consciousness:  Alert      Response to Learning: Progressing to goal      Endings: None Reported    Modes of Intervention: Education and Support      Discipline Responsible: /Counselor      Signature:  ADRIENNE Salinas

## 2022-11-06 NOTE — PROGRESS NOTES
Daily Progress Note  11/6/2022    Patient Name: Tiana Andres:  Depression with suicidal ideation and psychosis         SUBJECTIVE:      Patient is seen today for a follow up assessment. Shadi Plata was compliant with scheduled Prozac this morning. She was not compliant with Zyprexa last night however does report that she plans to take it tonight. She is compliant with assessment today and is agreeable to assessment in unit sensory room. When asked about events leading up to hospitalization Shadi Plata reports that she was having a lot of \"drastic mood swings. \"  She reports that she is feeling \"exhausted\" and not like herself currently. She seems much less paranoid today and actually engages in assessment which is much different from previous days. She reports that she continues to have some depression and anxiety. She reports that she struggled to fall asleep last night. She states that her appetite is \"beginning to come back. \"      Shadi Plata reports improvement in suicidal ideation stating the thoughts are much less intense and severe today. She denies homicidal ideation. She denies any perceptual disturbances today however states that she is having racing thoughts and states, \"sometimes it is hard for my mind to calm down. \"  She reports that she has been in contact with her  today. She was educated on her medications and does report that she plans on taking the Zyprexa tonight to help with her sleep and racing thoughts. She denies any side effects at this time. Today is the first day that Shadi Plata is showing some stability, we will continue to monitor for further stability at this time. She continues to require inpatient hospitalization for continued stability and safety. Spoke with patient's  Eduardo Porras ([phone]), he was calling for an update on patient.   He does report that he has noted improvement in Shadi Plata today and states that she did sound well when he spoke with her on the phone. He was educated about medications and importance of medication compliance. He was glad for the update and states he will call this writer if he has any further questions or concerns. Appetite:  Improving    Sleep:       [] Normal/Adequate/Unchanged  [x] Fair  [] Poor      Group Attendance on Unit:   [x] Yes  [] Selectively    [] No    Medication Side Effects:  Patient denies any medication side effects at the time of assessment. Mental Status Exam  Level of consciousness: Alert and awake. Appearance: Appropriate attire for setting, seated in chair, with poor grooming and hygiene. Behavior/Motor: Approachable, psychomotor slowing, much less bizarre  Attitude toward examiner: Cooperative, attentive, poor eye contact. Speech: Normal rate, normal volume, normal tone. Mood:  Patient reports \"exhausted\". Affect: Flat, blunted  Thought processes: Much more linear and coherent today  Thought content: Denies homicidal ideation. Suicidal Ideation: Reports improvement in suicidal ideations  Delusions: Paranoid delusions appear to be improving  Perceptual Disturbance: Patient does not appear to be responding to internal stimuli. Denies auditory hallucinations. Denies visual hallucinations. Cognition: Oriented to self, location, time, and situation. Memory: Intact. Insight & Judgement: Poor. Data   height is 5' 2\" (1.575 m) and weight is 115 lb (52.2 kg). Her oral temperature is 97.7 °F (36.5 °C). Her blood pressure is 106/69 and her pulse is 71. Her respiration is 14 and oxygen saturation is 96%.    Labs:   Admission on 10/30/2022   Component Date Value Ref Range Status    WBC 10/30/2022 6.3  3.5 - 11.0 k/uL Final    RBC 10/30/2022 5.14  4.0 - 5.2 m/uL Final    Hemoglobin 10/30/2022 15.0  12.0 - 16.0 g/dL Final    Hematocrit 10/30/2022 45.8  36 - 46 % Final    MCV 10/30/2022 89.0  80 - 100 fL Final    MCH 10/30/2022 29.2  26 - 34 pg Final    MCHC 10/30/2022 32.8  31 - 37 g/dL Final    RDW 10/30/2022 13.6  11.5 - 14.9 % Final    Platelets 82/25/2086 297  150 - 450 k/uL Final    MPV 10/30/2022 8.7  6.0 - 12.0 fL Final    Seg Neutrophils 10/30/2022 74 (A)  36 - 66 % Final    Lymphocytes 10/30/2022 19 (A)  24 - 44 % Final    Monocytes 10/30/2022 7  1 - 7 % Final    Eosinophils % 10/30/2022 0  0 - 4 % Final    Basophils 10/30/2022 0  0 - 2 % Final    Segs Absolute 10/30/2022 4.60  1.3 - 9.1 k/uL Final    Absolute Lymph # 10/30/2022 1.20  1.0 - 4.8 k/uL Final    Absolute Mono # 10/30/2022 0.40  0.1 - 1.3 k/uL Final    Absolute Eos # 10/30/2022 0.00  0.0 - 0.4 k/uL Final    Basophils Absolute 10/30/2022 0.00  0.0 - 0.2 k/uL Final    Glucose 10/30/2022 104 (A)  70 - 99 mg/dL Final    BUN 10/30/2022 13  6 - 20 mg/dL Final    Creatinine 10/30/2022 0.59  0.50 - 0.90 mg/dL Final    Est, Glom Filt Rate 10/30/2022 >60  >60 mL/min/1.73m2 Final    Comment:       Effective Oct 3, 2022        These results are not intended for use in patients <25years of age. eGFR results are calculated without a race factor using the 2021 CKD-EPI equation. Careful clinical correlation is recommended, particularly when comparing to results   calculated using previous equations. The CKD-EPI equation is less accurate in patients with extremes of muscle mass, extra-renal   metabolism of creatine, excessive creatine ingestion, or following therapy that affects   renal tubular secretion.       Calcium 10/30/2022 9.5  8.6 - 10.4 mg/dL Final    Sodium 10/30/2022 140  135 - 144 mmol/L Final    Potassium 10/30/2022 3.9  3.7 - 5.3 mmol/L Final    Chloride 10/30/2022 102  98 - 107 mmol/L Final    CO2 10/30/2022 21  20 - 31 mmol/L Final    Anion Gap 10/30/2022 17  9 - 17 mmol/L Final    Alkaline Phosphatase 10/30/2022 99  35 - 104 U/L Final    ALT 10/30/2022 9  5 - 33 U/L Final    AST 10/30/2022 14  <32 U/L Final    Total Bilirubin 10/30/2022 0.5  0.3 - 1.2 mg/dL Final    Total Protein 10/30/2022 8.2  6.4 - 8.3 g/dL Final    Albumin 10/30/2022 5.2  3.5 - 5.2 g/dL Final    Ethanol 10/30/2022 <10  <10 mg/dL Final    Ethanol percent 10/30/2022 <0.010  % Final    hCG Qual 10/30/2022 NEGATIVE  NEGATIVE Final    Comment: Specimens with hCG levels near the threshold of the test (25 mIU/mL) may give a negative or   indeterminate result. In such cases, another test should be performed with a new specimen   in 48-72 hours. If early pregnancy is suspected clinically in this setting, correlation   with quantitative serum b-hCG level is suggested. TSH 10/31/2022 1.53  0.30 - 5.00 uIU/mL Final         Reviewed patient's current plan of care and vital signs with nursing staff. Labs reviewed: [x] Yes    Medications  Current Facility-Administered Medications: FLUoxetine (PROZAC) capsule 10 mg, 10 mg, Oral, Daily  levothyroxine (SYNTHROID) tablet 50 mcg, 50 mcg, Oral, Daily  ferrous sulfate (IRON 325) tablet 325 mg, 325 mg, Oral, BID  OLANZapine zydis (ZYPREXA) disintegrating tablet 5 mg, 5 mg, Oral, Nightly  haloperidol lactate (HALDOL) injection 5 mg, 5 mg, IntraMUSCular, Q6H PRN **AND** LORazepam (ATIVAN) injection 2 mg, 2 mg, IntraMUSCular, Q6H PRN **AND** diphenhydrAMINE (BENADRYL) injection 50 mg, 50 mg, IntraMUSCular, Q6H PRN  acetaminophen (TYLENOL) tablet 650 mg, 650 mg, Oral, Q6H PRN  ibuprofen (ADVIL;MOTRIN) tablet 400 mg, 400 mg, Oral, Q6H PRN  [Held by provider] hydrOXYzine HCl (ATARAX) tablet 50 mg, 50 mg, Oral, TID PRN  [Held by provider] traZODone (DESYREL) tablet 50 mg, 50 mg, Oral, Nightly PRN  polyethylene glycol (GLYCOLAX) packet 17 g, 17 g, Oral, Daily PRN  aluminum & magnesium hydroxide-simethicone (MAALOX) 200-200-20 MG/5ML suspension 30 mL, 30 mL, Oral, Q6H PRN  nicotine polacrilex (NICORETTE) gum 2 mg, 2 mg, Oral, Q2H PRN    ASSESSMENT  Major depressive disorder, recurrent, severe with psychotic features ([facility] [address]         HANDOFF  Patient symptoms are: Modestly Improving.   Medications as determined by attending physician  Continue

## 2022-11-06 NOTE — PLAN OF CARE
Problem: Depression  Goal: Will be euthymic at discharge  Description: INTERVENTIONS:  1. Administer medication as ordered  2. Provide emotional support via 1:1 interaction with staff  3. Encourage involvement in milieu/groups/activities  4. Monitor for social isolation  Outcome: Progressing     Problem: Psychosis  Goal: Will report no hallucinations or delusions  Description: INTERVENTIONS:  1. Administer medication as  ordered  2. Assist with reality testing to support increasing orientation  3. Assess if patient's hallucinations or delusions are encouraging self harm or harm to others and intervene as appropriate  Outcome: Progressing     Problem: Sleep Disturbance  Goal: Will exhibit normal sleeping pattern  Description: INTERVENTIONS:  1. Administer medication as ordered  2. Decrease environmental stimuli, including noise, as appropriate  3. Discourage social isolation and naps during the day  Outcome: Progressing     Patient denies thoughts of self harm or thoughts to harm others. Patient admits to hallucinations of people and people talking. Patient reports she is sleeping well. Patient is flat but cooperative today. Will continue to monitor and offer support as needed.

## 2022-11-07 PROCEDURE — 6370000000 HC RX 637 (ALT 250 FOR IP): Performed by: PSYCHIATRY & NEUROLOGY

## 2022-11-07 PROCEDURE — 1240000000 HC EMOTIONAL WELLNESS R&B

## 2022-11-07 RX ORDER — FLUOXETINE 10 MG/1
10 CAPSULE ORAL DAILY
Qty: 30 CAPSULE | Refills: 3 | Status: SHIPPED | OUTPATIENT
Start: 2022-11-08

## 2022-11-07 RX ORDER — LEVOTHYROXINE SODIUM 0.05 MG/1
50 TABLET ORAL DAILY
Qty: 30 TABLET | Refills: 0 | Status: SHIPPED | OUTPATIENT
Start: 2022-11-08

## 2022-11-07 RX ADMIN — FLUOXETINE 10 MG: 10 CAPSULE ORAL at 09:01

## 2022-11-07 RX ADMIN — FERROUS SULFATE TAB 325 MG (65 MG ELEMENTAL FE) 325 MG: 325 (65 FE) TAB at 09:02

## 2022-11-07 NOTE — BH NOTE
Patient refused AM scheduled medications. \" I thought when you sign in you don't have to  take medication if you want\". Will continue to encouraged.

## 2022-11-07 NOTE — GROUP NOTE
Group Therapy Note    Date: 11/7/2022    Group Start Time: 1330  Group End Time: 6157  Group Topic: Psychoeducation    [facility]    KATINA Kingston        Group Therapy Note    Attendees: 6/16    Psych-Ed/Relapse Prevention Group Note        Date: November 7, 2022 Start Time: 1:30pm  End Time: 2:15pm      Number of Participants in Group & Unit Census:  6/16    Topic: creative expression, communication, concentration     Goal of Group: To improve creative expression and concentration through collaborating with peers and focusing on a presented task. Comments:     Patient did not participate in Psych-Ed/Relapse Prevention group, despite staff encouragement and explanation of benefits. Patient remain seclusive to self. Q15 minute safety checks maintained for patient safety and will continue to encourage patient to attend unit programming.         Signature:  KATINA Kingston

## 2022-11-07 NOTE — GROUP NOTE
Group Therapy Note    Date: 11/7/2022    Group Start Time: 1100  Group End Time: 7637  Group Topic: Cognitive Skills    TONY CHAN    KATINA Canseco        Group Therapy Note    Attendees: 7/16       Patient's Goal:  To improve concentration and decision making skills through collaborating with peers and focusing on a presented task. Notes:  Patient attended and participated in group. Patient demonstrated ability to collaborate with peers and concentrate on a presented task. Patient was pleasant and cooperative. Status After Intervention:  Improved    Participation Level:  Active Listener and Interactive    Participation Quality: Appropriate and Attentive      Speech:  hesitant      Thought Process/Content: Logical      Affective Functioning: Blunted, brightened       Mood: dysphoric      Level of consciousness:  Preoccupied, easily redirectable to task      Response to Learning: Able to verbalize/acknowledge new learning, Able to retain information, and Progressing to goal      Endings: None Reported    Modes of Intervention: Education, Support, Socialization, and Activity      Discipline Responsible: Psychoeducational Specialist      Signature:  Sumaya Salamanca, [address]

## 2022-11-07 NOTE — PLAN OF CARE
Problem: Depression  Goal: Will be euthymic at discharge  Description: INTERVENTIONS:  1. Administer medication as ordered  2. Provide emotional support via 1:1 interaction with staff  3. Encourage involvement in milieu/groups/activities  4. Monitor for social isolation  Outcome: Progressing   Patient is not yet euthymic    Problem: Psychosis  Goal: Will report no hallucinations or delusions  Description: INTERVENTIONS:  1. Administer medication as  ordered  2. Assist with reality testing to support increasing orientation  3. Assess if patient's hallucinations or delusions are encouraging self harm or harm to others and intervene as appropriate  Outcome: Progressing  Patient denies hallucinations and/or delusions     Problem: Sleep Disturbance  Goal: Will exhibit normal sleeping pattern  Description: INTERVENTIONS:  1. Administer medication as ordered  2. Decrease environmental stimuli, including noise, as appropriate  3.  Discourage social isolation and naps during the day  Outcome: Progressing  Patient has exhibited normal sleeping patterns

## 2022-11-07 NOTE — PROGRESS NOTES
Behavioral Services                                              Medicare Re-Certification    I certify that the inpatient psychiatric hospital services furnished since the previous certification/re-certification were, and continue to be, medically necessary for;    [x] (1) Treatment which could reasonably be expected to improve the patient's condition,    [x] (2) Or for diagnostic study. Estimated length of stay/service 1-3 days    Plan for post-hospital care home with outpatient [facility] f/u    This patient continues to need, on a daily basis, active treatment furnished directly by or requiring the supervision of inpatient psychiatric personnel.     Electronically signed by Lul Tinoco MD on 11/7/2022 at 11:19 AM

## 2022-11-07 NOTE — PLAN OF CARE
[facility] Interdisciplinary Treatment Plan Note     Review Date & Time: 11/7/2022  1245    Admission Type:   Admission Type: Involuntary    Reason for admission:  Reason for Admission: Acute psychosis. Patient has been struggling with her mental health recently, beleving that she is going to die, weight loss of 15+ pounds, and suicidal ideation without a plan. Estimated Length of Stay Update:  Est 3-7 days, to be determined by physician  Estimated Discharge Date Update: to be determined by physician    PATIENT STRENGTHS:  Patient Strengths:   Patient Strengths and Limitations:Limitations: Perceives need for assistance with self-care, Difficulty problem solving/relies on others to help solve problems, General negative or hopeless attitude about future/recovery, External locus of control, Unrealistic self-view  Addictive Behavior:Addictive Behavior  In the Past 3 Months, Have You Felt or Has Someone Told You That You Have a Problem With  : None  Medical Problems:   Past Medical History:   Diagnosis Date    Hypothyroidism     PCOS (polycystic ovarian syndrome)        Risk:  Fall Risk   Joshua Scale Joshua Scale Score: 22  BVC    Change in scores NO.  Changes to plan of Care  NO    Status EXAM:   Mental Status and Behavioral Exam  Normal: No  Level of Assistance: Independent/Self  Facial Expression: Flat  Affect: Unstable, Constricted  Level of Consciousness: Alert  Frequency of Checks: 4 times per hour, close  Mood:Normal: No  Mood: Helpless, Empty, Ambivalent, Anxious  Motor Activity:Normal: Yes  Motor Activity: Decreased  Eye Contact: Fair  Observed Behavior: Guarded, Preoccupied  Sexual Misconduct History: Current - no  Preception: [place] to person  Attention:Normal: No  Attention: Unable to concentrate  Thought Processes: Blocking  Thought Content:Normal: No  Thought Content: Preoccupations, Paranoia  Depression Symptoms: Feelings of helplessness, Feelings of hopelessess, Loss of interest, Impaired concentration  Anxiety Symptoms: Generalized  [place] Symptoms: Poor judgment  Hallucinations: None  Delusions: No  Delusions: Paranoid  Memory:Normal: Yes  Memory: Poor recent, Poor remote  Insight and Judgment: No  Insight and Judgment: Poor judgment, Poor insight, Unmotivated    Daily Assessment Last Entry:   Daily Sleep (WDL): Within Defined Limits            Daily Nutrition (WDL): Within Defined Limits  Level of Assistance: Independent/Self    Patient Monitoring:  Frequency of Checks: 4 times per hour, close    Psychiatric Symptoms:   Depression Symptoms  Depression Symptoms: Feelings of helplessness, Feelings of hopelessess, Loss of interest, Impaired concentration  Anxiety Symptoms  Anxiety Symptoms: Generalized  Batool Symptoms  [place] Symptoms: Poor judgment          Suicide Risk CSSR-S:  1) Within the past month, have you wished you were dead or wished you could go to sleep and not wake up? : Yes  2) Have you actually had any thoughts of killing yourself? : Yes  3) Have you been thinking about how you might kill yourself? : No  5) Have you started to work out or worked out the details of how to kill yourself?  Do you intend to carry out this plan? : No  6) Have you ever done anything, started to do anything, or prepared to do anything to end your life?: No  Change in Result NO Change in Plan of care NO    EDUCATION:   Learner Progress Toward Treatment Goals: Reviewed goals and plan of care    Method: Individual    Outcome: Needs reinforcement    PATIENT GOALS: SHORT-TERM GOALS UPDATE: medication compliancy  LONG-TERM GOALS UPDATE: continue to be medication compliant and attend follow up appointments    PLAN/TREATMENT RECOMMENDATIONS UPDATE:   [address] Siddhartha, GOAL SETTING    SHORT-TERM GOALS UPDATE: medication compliancy  Time frame for Short-Term Goals: 1-2 WEEKS     LONG-TERM GOALS UPDATE: continue to be medication compliant and attend follow up appointments  Time frame for Long-Term Goals: 6 MONTHS  Members Present in Team Meeting: See Signature Sheet    Albino Murphy RN

## 2022-11-07 NOTE — PROGRESS NOTES
CLINICAL PHARMACY NOTE: MEDS TO BEDS    Total # of Prescriptions Filled: 2   The following medications were delivered to the patient:  Fluoxetine HCL 10mg  Levothyroxine Sodium 50mcg    Additional Documentation:  Delivered Medication to [address] [place] [address]

## 2022-11-07 NOTE — BH NOTE
Writer offered patient her night medications and patient refused stating \"I'm alright, but if I change my mind, I'll let you know\".

## 2022-11-07 NOTE — GROUP NOTE
Group Therapy Note    Date: 11/6/2022    Group Start Time: 2020  Group End Time: 2050  Group Topic: Wrap-Up    KAYLEEN Batista        Group Therapy Note    Attendees: 5/18           Status After Intervention:  Improved    Participation Level:  Active Listener    Participation Quality: Appropriate      Speech:  normal      Thought Process/Content: Logical      Affective Functioning: Congruent      Mood: elevated      Level of consciousness:  Alert      Response to Learning: Able to verbalize current knowledge/experience      Endings: None Reported    Modes of Intervention: Socialization      Discipline Responsible: Behavorial Health Tech      Signature:  Cassandra Abbasi

## 2022-11-08 VITALS
TEMPERATURE: 98 F | HEART RATE: 64 BPM | OXYGEN SATURATION: 96 % | DIASTOLIC BLOOD PRESSURE: 64 MMHG | BODY MASS INDEX: 21.16 KG/M2 | SYSTOLIC BLOOD PRESSURE: 99 MMHG | RESPIRATION RATE: 14 BRPM | HEIGHT: 62 IN | WEIGHT: 115 LBS

## 2022-11-08 NOTE — BH NOTE
[facility]  Discharge Note    Pt discharged with followings belongings:   Dental Appliances: None  Vision - Corrective Lenses: None  Hearing Aid: None  Jewelry: Earrings  Body Piercings Removed: No  Clothing: Footwear, Jacket/Coat, Pants, Shirt, Undergarments  Other Valuables: Other (Comment) (n/a)   Valuables sent home with or returned to patient. Patient educated on aftercare instructions: verbally given, written copy sent with patient  Information faxed to [facility] by Staff  at 12:42 PM .Patient verbalize understanding of AVS:  yes. Patient discharged to home, meds sent home. Patient alert and oriented X4. Patient denies thoughts of harm to self or others. Patient discharged with all belongings.    picked her up at [address] [place] [address] upon discharge:  Mental Status and Behavioral Exam  Normal: No  Level of Assistance: Independent/Self  Facial Expression: Avoids Gaze, Sad  Affect: Unstable  Level of Consciousness: Alert  Frequency of Checks: 4 times per hour, close  Mood:Normal: No  Mood: Labile, Sad  Motor Activity:Normal: Yes  Motor Activity: Decreased  Eye Contact: Poor  Observed Behavior: Guarded, Preoccupied  Sexual Misconduct History: Current - no  Preception: [place] to person, [place] to time, [place] to place, [place] to situation  Attention:Normal: No  Attention: Distractible  Thought Processes: Flight of ideas  Thought Content:Normal: No  Thought Content: Preoccupations  Depression Symptoms: Impaired concentration, Isolative  Anxiety Symptoms: Generalized  [place] Symptoms: Poor judgment  Hallucinations: None  Delusions: No  Delusions: Paranoid  Memory:Normal: No  Memory: Poor recent, Poor remote  Insight and Judgment: No  Insight and Judgment: Poor judgment, Poor insight    Tobacco Screening:  Practical Counseling, on admission, jax X, if applicable and completed (first 3 are required if patient doesn't refuse):            ( ) Recognizing danger situations (included triggers and roadblocks)                    ( ) Coping skills (new ways to manage stress,relaxation techniques, changing routine, distraction)                                                           ( ) Basic information about quitting (benefits of quitting, techniques in how to quit, available resources  ( ) Referral for counseling faxed to [facility]                                                                                                                   ( ) Patient refused counseling  ( ) Patient refused referral  ( ) Patient refused prescription upon discharge  ( X) Patient has not smoked in the last 30 days    Metabolic Screening:    Lab Results   Component Value Date    LABA1C 4.8 06/24/2019       Lab Results   Component Value Date    CHOL 165 06/24/2019    CHOL 142 07/18/2014     Lab Results   Component Value Date    TRIG 43 06/24/2019    TRIG 57 07/18/2014     Lab Results   Component Value Date    HDL 67 06/24/2019    HDL 60 07/18/2014     No components found for: LDLCAL  No results found for: Va Gee RN

## 2022-11-08 NOTE — GROUP NOTE
Group Therapy Note    Date: 11/8/2022    Group Start Time: 1000  Group End Time: 1055  Group Topic: Psychotherapy    KAYLEEN CHAN    OBDULIA Dueñas, ADRIENNE        Group Therapy Note    Attendees: 5/19       Patient's Goal:  Increase interpersonal relationship skills utilizing talk therapy while discussing positive coping skills for depression. Status After Intervention:  Improved    Participation Level:  Active Listener and Interactive    Participation Quality: Appropriate, Attentive, and Sharing      Speech:  normal      Thought Process/Content: Logical      Affective Functioning: Congruent      Mood: euthymic      Level of consciousness:  Alert and Attentive      Response to Learning: Able to verbalize current knowledge/experience and Able to verbalize/acknowledge new learning      Endings: None Reported    Modes of Intervention: Education and Support      Discipline Responsible: /Counselor      Signature:  OBDULIA Dueñas, ADRIENNE

## 2022-11-08 NOTE — DISCHARGE INSTRUCTIONS
Information:  Medications:   Medication summary provided   I understand that I should take only the medications on my list.     -why and when I need to take each medicine.     -which side effects to watch for.     -that I should carry my medication information at all times in case of     Emergency situations. I will take all of my medicines to follow up appointments.     -check with my physician or pharmacist before taking any new    Medication, over the counter product or drink alcohol.    -Ask about food, drug or dietary supplement interactions.    -discard old lists and update records with medication providers. Notify Physician:  Notify physician if you notice:   Always call 911 if you feel your life is in danger  In case of an emergency call 911 immediately! If 911 is not available call your local emergency medical system for help    Behavioral Health Follow Up:  Original Referral Source:ED  Discharge Diagnosis: Depression with suicidal ideation [F32. A, R45.851]  Acute psychosis ([facility] [address] [F23]  Recommendations for Level of Care:  follow up with all appointments  Patient status at discharge: Stable  My hospital  was: [facility]  Aftercare plan faxed:  Christina Renteria   -faxed by: staff   -date: 11/8/22   -time: noon  Prescriptions:  [facility]       Suicidal Thoughts and Behavior: Care Instructions  Overview  You have been seen by a doctor because you've had thoughts of suicide or have harmed yourself. Your doctor and support team want to help keep you safe. Your team may include a , a , and a counselor. People often think about suicide because they feel hopeless, helpless, or worthless. These feelings may come from having a mental health problem, such as depression. These problems can be treated. It's important to remember that there are people who care about you. Your doctor and support team take your pain very seriously, and they want to help.  Treatment and close follow-up care can help you feel better. Follow-up care is a key part of your treatment and safety. Be sure to make and go to all appointments, and call your doctor if you are having problems. It's also a good idea to know your test results and keep a list of the medicines you take. How can you care for yourself at home? Where to get help 24 hours a day, 7 days a week   If you or someone you know talks about suicide, self-harm, a mental health crisis, a substance use crisis, or any other kind of emotional distress, get help right away. You can:  Call the Suicide and Crisis Lifeline at 65. Call 9-999-199-TALK (3-648.884.2949). Text HOME to 542676 to access the Crisis Text Line. Consider saving these numbers in your phone. Other things you can do   Talk to someone. Be open about your feelings. Reach out to a trusted family member or friend, your doctor, or a counselor. Attend all counseling sessions recommended by your doctor. Make a suicide safety plan. This is a set of steps you can take when you feel suicidal. It includes your warning signs, coping strategies, and people you can ask for support. It's best to work with a therapist to make your plan. Ask someone to remove and store any guns, pills, or other means of suicide. Avoid alcohol and drug use. Be safe with medicines. Take your medicines exactly as prescribed. Call your doctor if you think you are having a problem with your medicine. When should you call for help? Call 911 anytime you think you may need emergency care. For example, call if:    You feel you cannot stop from hurting yourself or someone else. Where to get help 24 hours a day, 7 days a week   If you or someone you know talks about suicide, self-harm, a mental health crisis, a substance use crisis, or any other kind of emotional distress, get help right away. You can:    Call the Suicide and Crisis Lifeline at 65. Call 2-642-435-TALK (2-430.435.7651).      Text HOME to 033619 to access the Crisis Text Line. Consider saving these numbers in your phone. Call your doctor now or seek immediate medical care if:    You have one or more warning signs of suicide. For example, call if:  You feel like giving away your possessions. You use illegal drugs or drink alcohol heavily. You talk or write about death. This may include writing suicide notes and talking about guns, knives, or pills. You start to spend a lot of time alone or spend more time alone than usual.     You hear voices. You start acting in an aggressive way that's not normal for you. Watch closely for changes in your health, and be sure to contact your doctor if you have any problems. Where can you learn more? Go to [email or web address]://[email or web address].[email or web address] org and sign in to your SolarPrint account. Enter A063 in the [employer] box to learn more about \"Suicidal Thoughts and Behavior: Care Instructions. \"     If you do not have an account, please click on the \"Sign Up Now\" link. Current as of: February 9, 2022               Content Version: 13.4  © 0137-2956 [employer]. Care instructions adapted under license by [facility] ([facility]). If you have questions about a medical condition or this instruction, always ask your healthcare professional. [address] any warranty or liability for your use of this information. Learning About COVID-19 and Flu Symptoms  How can you tell COVID-19 from the flu? COVID-19 and the flu have similar symptoms. The two can be hard to tell apart. The only way to know for sure which illness you have is to be tested. If you have questions about COVID-19 testing, ask your doctor or go to cdc.gov to use the COVID-19 Viral Testing Tool. Since the symptoms are so alike, it makes sense to act as if you have COVID-19 until your test results come back. This means staying home and limiting contact with people in your home.  You'll need to wash your hands often and disinfect surfaces that you touch. And be sure to wear a mask when you're around other people. This is also good advice if you think you have the flu. COVID-19 and the flu have these symptoms in common:  Fever or chills  Cough  Shortness of breath  Fatigue (tiredness)  Sore throat  Runny or stuffy nose  Muscle and body aches  Headache  Vomiting and diarrhea (more common in children than adults)  COVID-19 has another symptom that also may occur:  New loss of taste or smell  COVID-19 symptoms may appear from 2 to 14 days after infection. Flu symptoms usually appear 1 to 4 days after infection. Why should you get the flu vaccine? It's important to get your yearly flu vaccine. Both the flu and COVID-19 can be active at the same time. You can get sick with both infections at once. And having both may make you more sick than getting just one. The flu vaccine won't protect you from COVID-19. But it can help prevent the flu or reduce its symptoms. If fewer people get very ill with the flu, this will help free up medical resources that are needed for people who need urgent care, such as those suffering from COVID-19, heart attacks, and injuries from accidents. Where can you learn more? Go to [email or web address] and sign in to your Astaro account. Enter C123 in the [employer] box to learn more about \"Learning About COVID-19 and Flu Symptoms. \"     If you do not have an account, please click on the \"Sign Up Now\" link. Current as of: July 28, 2022               Content Version: 13.4  © 2006-2022 [employer]. Care instructions adapted under license by [facility] ([facility]). If you have questions about a medical condition or this instruction, always ask your healthcare professional. [place] [address] any warranty or liability for your use of this information. Smoking: Quit Smoking.    Call the [employer]'s smoking quitline at 8-132-78Q-QUIT  Know the signs of a heart attack   If you have any of the following symptoms call 911 immediately, do not wait more    Than five minutes. 1. Pressure, fullness and/ or squeezing in the center of the chest spreading to    The jaw, neck or shoulder. 2. Chest discomfort with light headedness, fainting, sweating, nausea or    Shortness of breath. 3. Upper abdominal pressure or discomfort. 4. Lower chest pain, back pain, unusual fatigue, shortness of breath, nausea   Or dizziness.      General Information:   Questions regarding your bill: Call HELP program [phone]     Suicide Hotline ([place] [address])  (747) 850-4401      Recovery Help line- 513.796.7598      To obtain results of pending studies call Medical Records at: [phone]     For emergencies and 24 hour/7 days a week contact information:  [phone]

## 2022-11-08 NOTE — PROGRESS NOTES
Daily Progress Note  Star Stevenson MD  11/7/2022  CHIEF COMPLAINT: Depression    Reviewed patient's current plan of care and vital signs with nursing staff. Sleep:  8 hours last night  Attending groups: Yes    SUBJECTIVE:    Patient is seen in the day area, smiling, laughing, participating actively in group. When we came into discussion she is reporting an improvement in her mood. She knows she has to work on her relationship and her trust issues. She is adamantly denying any suicidal ideation intent or plan. Reports good sleep last night. Reports good appetite this morning. Denying side effects to medication. Declined to take olanzapine and wants to stick with Prozac. She is more forward-looking and constructive. Spent time in supportive psychotherapy exploring relationship dynamics and issues of trust.    Mental Status Exam  Level of consciousness:  Within normal limits  Appearance: Hospital attire, seated in chair, with good grooming and hygiene   Behavior/Motor: No abnormalities noted  Attitude toward examiner:  Cooperative, attentive, good eye contact  Speech:  spontaneous, normal rate, normal volume and well articulated  Mood: \"Better\"  Affect: Fair  Thought processes:  linear, goal directed and coherent  Thought content:  denies homicidal ideation  Suicidal Ideation: Denies suicidal ideation  Delusions:  no evidence of delusions  Perceptual Disturbance:  denies any perceptual disturbance  Cognition:  Oriented to self, location, time, and situation  Memory: age appropriate  Insight & Judgement: improving  Medication side effects:  denies       Data   height is 5' 2\" (1.575 m) and weight is 115 lb (52.2 kg). Her temperature is 98 °F (36.7 °C). Her blood pressure is 108/72 and her pulse is 66. Her respiration is 14 and oxygen saturation is 96%.    Labs:   Admission on 10/30/2022   Component Date Value Ref Range Status    WBC 10/30/2022 6.3  3.5 - 11.0 k/uL Final    RBC 10/30/2022 5.14  4.0 - 5.2 m/uL Final    Hemoglobin 10/30/2022 15.0  12.0 - 16.0 g/dL Final    Hematocrit 10/30/2022 45.8  36 - 46 % Final    MCV 10/30/2022 89.0  80 - 100 fL Final    MCH 10/30/2022 29.2  26 - 34 pg Final    MCHC 10/30/2022 32.8  31 - 37 g/dL Final    RDW 10/30/2022 13.6  11.5 - 14.9 % Final    Platelets 67/07/6641 297  150 - 450 k/uL Final    MPV 10/30/2022 8.7  6.0 - 12.0 fL Final    Seg Neutrophils 10/30/2022 74 (A)  36 - 66 % Final    Lymphocytes 10/30/2022 19 (A)  24 - 44 % Final    Monocytes 10/30/2022 7  1 - 7 % Final    Eosinophils % 10/30/2022 0  0 - 4 % Final    Basophils 10/30/2022 0  0 - 2 % Final    Segs Absolute 10/30/2022 4.60  1.3 - 9.1 k/uL Final    Absolute Lymph # 10/30/2022 1.20  1.0 - 4.8 k/uL Final    Absolute Mono # 10/30/2022 0.40  0.1 - 1.3 k/uL Final    Absolute Eos # 10/30/2022 0.00  0.0 - 0.4 k/uL Final    Basophils Absolute 10/30/2022 0.00  0.0 - 0.2 k/uL Final    Glucose 10/30/2022 104 (A)  70 - 99 mg/dL Final    BUN 10/30/2022 13  6 - 20 mg/dL Final    Creatinine 10/30/2022 0.59  0.50 - 0.90 mg/dL Final    Est, Glom Filt Rate 10/30/2022 >60  >60 mL/min/1.73m2 Final    Comment:       Effective Oct 3, 2022        These results are not intended for use in patients <25years of age. eGFR results are calculated without a race factor using the 2021 CKD-EPI equation. Careful clinical correlation is recommended, particularly when comparing to results   calculated using previous equations. The CKD-EPI equation is less accurate in patients with extremes of muscle mass, extra-renal   metabolism of creatine, excessive creatine ingestion, or following therapy that affects   renal tubular secretion.       Calcium 10/30/2022 9.5  8.6 - 10.4 mg/dL Final    Sodium 10/30/2022 140  135 - 144 mmol/L Final    Potassium 10/30/2022 3.9  3.7 - 5.3 mmol/L Final    Chloride 10/30/2022 102  98 - 107 mmol/L Final    CO2 10/30/2022 21  20 - 31 mmol/L Final    Anion Gap 10/30/2022 17  9 - 17 mmol/L Final    Alkaline Phosphatase 10/30/2022 99  35 - 104 U/L Final    ALT 10/30/2022 9  5 - 33 U/L Final    AST 10/30/2022 14  <32 U/L Final    Total Bilirubin 10/30/2022 0.5  0.3 - 1.2 mg/dL Final    Total Protein 10/30/2022 8.2  6.4 - 8.3 g/dL Final    Albumin 10/30/2022 5.2  3.5 - 5.2 g/dL Final    Ethanol 10/30/2022 <10  <10 mg/dL Final    Ethanol percent 10/30/2022 <0.010  % Final    hCG Qual 10/30/2022 NEGATIVE  NEGATIVE Final    Comment: Specimens with hCG levels near the threshold of the test (25 mIU/mL) may give a negative or   indeterminate result. In such cases, another test should be performed with a new specimen   in 48-72 hours. If early pregnancy is suspected clinically in this setting, correlation   with quantitative serum b-hCG level is suggested.       TSH 10/31/2022 1.53  0.30 - 5.00 uIU/mL Final            Medications  Current Facility-Administered Medications: FLUoxetine (PROZAC) capsule 10 mg, 10 mg, Oral, Daily  levothyroxine (SYNTHROID) tablet 50 mcg, 50 mcg, Oral, Daily  ferrous sulfate (IRON 325) tablet 325 mg, 325 mg, Oral, BID  OLANZapine zydis (ZYPREXA) disintegrating tablet 5 mg, 5 mg, Oral, Nightly  haloperidol lactate (HALDOL) injection 5 mg, 5 mg, IntraMUSCular, Q6H PRN **AND** LORazepam (ATIVAN) injection 2 mg, 2 mg, IntraMUSCular, Q6H PRN **AND** diphenhydrAMINE (BENADRYL) injection 50 mg, 50 mg, IntraMUSCular, Q6H PRN  acetaminophen (TYLENOL) tablet 650 mg, 650 mg, Oral, Q6H PRN  ibuprofen (ADVIL;MOTRIN) tablet 400 mg, 400 mg, Oral, Q6H PRN  [Held by provider] hydrOXYzine HCl (ATARAX) tablet 50 mg, 50 mg, Oral, TID PRN  [Held by provider] traZODone (DESYREL) tablet 50 mg, 50 mg, Oral, Nightly PRN  polyethylene glycol (GLYCOLAX) packet 17 g, 17 g, Oral, Daily PRN  aluminum & magnesium hydroxide-simethicone (MAALOX) 200-200-20 MG/5ML suspension 30 mL, 30 mL, Oral, Q6H PRN  nicotine polacrilex (NICORETTE) gum 2 mg, 2 mg, Oral, Q2H PRN    ASSESSMENT  Major depressive disorder, recurrent, severe with psychotic features [facility])     PLAN  Patient s symptoms   are improving  Continue with current medication  Attempt to develop insight  Psycho-education conducted. Supportive Therapy conducted. Probable discharge is tomorrow  Follow-up daily while in the inpatient unit    More than 16 mins of the 30-minute session was spent doing Supportive psychotherapy. Electronically signed by Annie Marquez MD on 11/7/22 at 8:39 PM EST    **This report has been created using voice recognition software. It may contain minor errors which are inherent in voice recognition technology. **
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
## 2022-11-08 NOTE — GROUP NOTE
Group Therapy Note    Date: 11/7/2022    Group Start Time: 2000  Group End Time: 2030  Group Topic: Recreational    [facility]    Freeman Caicedo RN        Group Therapy Note    Attendees: 8     patient refused to attend recreational group at 8 pm after encouragement from staff.   1:1 talk time was offered but declined as an  alternative to group session   Signature:  Freeman Caicedo RN        Signature:  Freeman Caicedo RN

## 2022-11-08 NOTE — PLAN OF CARE
Problem: Psychosis  Goal: Will report no hallucinations or delusions  Description: INTERVENTIONS:    Problem: Depression  Goal: Will be euthymic at discharge  Description: INTERVENTIONS:    11/7/2022 2220 by Sydnie Guaman LPN  Outcome: Progressing     11/7/2022 2220 by Sydnei Guaman LPN  Outcome: Progressing   Patient denies suicidal ideas at this time. Patient denies homicidal ideas at this time. Patient denies depressive symptoms at this time. Patient has been out in day room watching tv. Patient is free of self harm at this time. Patient agrees to seek out staff if thoughts to harm self arise. Staff will provide support and reassurance as needed. Safety checks maintained every 15 minutes.

## 2023-04-03 PROBLEM — Z23 NEED FOR TDAP VACCINATION: Status: RESOLVED | Noted: 2021-06-09 | Resolved: 2023-04-03

## 2023-05-18 ENCOUNTER — OFFICE VISIT (OUTPATIENT)
Dept: OBGYN CLINIC | Age: 33
End: 2023-05-18

## 2023-05-18 VITALS
WEIGHT: 144 LBS | HEART RATE: 75 BPM | BODY MASS INDEX: 26.5 KG/M2 | HEIGHT: 62 IN | SYSTOLIC BLOOD PRESSURE: 100 MMHG | DIASTOLIC BLOOD PRESSURE: 63 MMHG

## 2023-05-18 DIAGNOSIS — N91.2 AMENORRHEA: Primary | ICD-10-CM

## 2023-05-18 DIAGNOSIS — E28.2 PCOS (POLYCYSTIC OVARIAN SYNDROME): Chronic | ICD-10-CM

## 2023-05-18 PROBLEM — O35.9XX0 KNOWN FETAL ANOMALY, ANTEPARTUM, NOT APPLICABLE OR UNSPECIFIED FETUS: Status: ACTIVE | Noted: 2021-05-19

## 2023-05-18 PROBLEM — O35.EXX0 FETAL RENAL ANOMALY, SINGLE GESTATION: Status: ACTIVE | Noted: 2021-05-19

## 2023-05-18 RX ORDER — MIRTAZAPINE 30 MG/1
TABLET, FILM COATED ORAL
COMMUNITY
Start: 2023-05-15 | End: 2023-05-18

## 2023-05-18 RX ORDER — BUSPIRONE HYDROCHLORIDE 5 MG/1
TABLET ORAL
COMMUNITY
Start: 2023-03-31 | End: 2023-05-18

## 2023-05-18 RX ORDER — MEDROXYPROGESTERONE ACETATE 10 MG/1
10 TABLET ORAL DAILY
Qty: 10 TABLET | Refills: 3 | Status: SHIPPED | OUTPATIENT
Start: 2023-05-18

## 2023-05-18 ASSESSMENT — ENCOUNTER SYMPTOMS
RESPIRATORY NEGATIVE: 1
GASTROINTESTINAL NEGATIVE: 1

## 2023-05-18 NOTE — PROGRESS NOTES
The patient is being seen for a cultures.    The patient was asked if they would like a chaperone in the room during the exam..  The patient has respectfully accepted

## 2023-05-18 NOTE — PROGRESS NOTES
[address] [facility]  [facility] [address] [facility] [address]  Dept: [phone]  Dept Fax: [phone]         2023  [address] [place] [address]       Chief Complaint  Chief Complaint   Patient presents with    Amenorrhea     Hasnt had period in six months not on [facility]    . Blood pressure 100/63, pulse 75, height 5' 2\" (1.575 m), weight 144 lb (65.3 kg), last menstrual period 2022, currently breastfeeding. HPI:     [address] [facility]  1990 is a 34 y.o.  here today due to amenorrhea. She has a hx of pcos and irregular cycles. She was last seen 2022 with the same complaints at that time she was breastfeeding. The plan with Dr. Lisa Keller was for her to call when she stopped breastfeeding and she would restart metformin and provera. She is no longer breastfeeding. She does not want to start metformin preferring to work on her diet but is agreeable to inducing cyclical withdrawal bleeds. She is not interested in birth control and hasn't been sexually active in over 6 months. She used to take thyroid medication but hasn't been.   Her TSH from October was normal.     OB History    Para Term  AB Living   2 2 2 0 0 2   SAB IAB Ectopic Molar Multiple Live Births   0 0 0 0 0 2      # Outcome Date GA Lbr Owen/2nd Weight Sex Delivery Anes PTL Lv   2 Term 21 39w4d  8 lb 1.3 oz (3.665 kg) F CS-LTranv Spinal N DEIDRE      Name: Iona Hood: 8  Apgar5: 9   1 Term 16 41w1d  8 lb 10.6 oz (3.93 kg) F CS-LTranv   DEIDRE      Complications: Failure to Progress in First Stage      Apgar1: 8  Apgar5: 9       Past Medical History:   Diagnosis Date    Hypothyroidism     PCOS (polycystic ovarian syndrome)        Past Surgical History:   Procedure Laterality Date     SECTION  2016     SECTION N/A 2021     SECTION performed by Helio Lindquist So, DO at [facility] L&D OR       Family

## 2024-06-12 DIAGNOSIS — N91.2 AMENORRHEA: ICD-10-CM

## 2024-06-12 DIAGNOSIS — E28.2 PCOS (POLYCYSTIC OVARIAN SYNDROME): Chronic | ICD-10-CM

## 2024-06-12 RX ORDER — MEDROXYPROGESTERONE ACETATE 10 MG/1
10 TABLET ORAL DAILY
Qty: 10 TABLET | Refills: 0 | OUTPATIENT
Start: 2024-06-12

## 2025-05-23 ENCOUNTER — APPOINTMENT (OUTPATIENT)
Dept: GENERAL RADIOLOGY | Age: 35
End: 2025-05-23
Payer: COMMERCIAL

## 2025-05-23 ENCOUNTER — APPOINTMENT (OUTPATIENT)
Dept: CT IMAGING | Age: 35
End: 2025-05-23
Payer: COMMERCIAL

## 2025-05-23 ENCOUNTER — HOSPITAL ENCOUNTER (EMERGENCY)
Age: 35
Discharge: ANOTHER HOSPITAL | End: 2025-05-23
Attending: EMERGENCY MEDICINE
Payer: COMMERCIAL

## 2025-05-23 ENCOUNTER — HOSPITAL ENCOUNTER (OUTPATIENT)
Dept: PSYCHIATRY | Age: 35
Discharge: HOME | End: 2025-05-25

## 2025-05-23 ENCOUNTER — HOSPITAL ENCOUNTER (EMERGENCY)
Age: 35
Discharge: HOME | End: 2025-05-24
Attending: EMERGENCY MEDICINE
Payer: COMMERCIAL

## 2025-05-23 VITALS
OXYGEN SATURATION: 100 % | SYSTOLIC BLOOD PRESSURE: 110 MMHG | TEMPERATURE: 99.6 F | RESPIRATION RATE: 13 BRPM | DIASTOLIC BLOOD PRESSURE: 78 MMHG | HEART RATE: 73 BPM

## 2025-05-23 VITALS
DIASTOLIC BLOOD PRESSURE: 53 MMHG | HEART RATE: 74 BPM | TEMPERATURE: 97.9 F | OXYGEN SATURATION: 100 % | BODY MASS INDEX: 25.76 KG/M2 | HEIGHT: 62 IN | WEIGHT: 140 LBS | RESPIRATION RATE: 16 BRPM | SYSTOLIC BLOOD PRESSURE: 115 MMHG
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
## 2025-05-23 DIAGNOSIS — M25.00 LIPOHEMARTHROSIS: ICD-10-CM

## 2025-05-23 DIAGNOSIS — S82.141A CLOSED FRACTURE OF RIGHT TIBIAL PLATEAU, INITIAL ENCOUNTER: Primary | ICD-10-CM

## 2025-05-23 LAB
25(OH)D3 SERPL-MCNC: 11.6 NG/ML (ref 30–100)
ANION GAP SERPL CALCULATED.3IONS-SCNC: 12 MMOL/L (ref 9–16)
BASOPHILS # BLD: <0.03 K/UL (ref 0–0.2)
BASOPHILS NFR BLD: 0 % (ref 0–2)
BUN SERPL-MCNC: 10 MG/DL (ref 6–20)
CALCIUM SERPL-MCNC: 8.9 MG/DL (ref 8.6–10.4)
CHLORIDE SERPL-SCNC: 104 MMOL/L (ref 98–107)
CO2 SERPL-SCNC: 23 MMOL/L (ref 20–31)
CREAT SERPL-MCNC: 0.6 MG/DL (ref 0.7–1.2)
EOSINOPHIL # BLD: <0.03 K/UL (ref 0–0.44)
EOSINOPHILS RELATIVE PERCENT: 0 % (ref 0–4)
ERYTHROCYTE [DISTWIDTH] IN BLOOD BY AUTOMATED COUNT: 12.7 % (ref 11.5–14.9)
GFR, ESTIMATED: >90 ML/MIN/1.73M2
GLUCOSE SERPL-MCNC: 100 MG/DL (ref 74–99)
HCG SERPL QL: NEGATIVE
HCT VFR BLD AUTO: 38.9 % (ref 36–46)
HGB BLD-MCNC: 13.4 G/DL (ref 12–16)
IMM GRANULOCYTES # BLD AUTO: <0.03 K/UL (ref 0–0.3)
IMM GRANULOCYTES NFR BLD: 0 %
LYMPHOCYTES NFR BLD: 1.35 K/UL (ref 1.1–3.7)
LYMPHOCYTES RELATIVE PERCENT: 16 % (ref 24–44)
MCH RBC QN AUTO: 30.5 PG (ref 26–34)
MCHC RBC AUTO-ENTMCNC: 34.4 G/DL (ref 31–37)
MCV RBC AUTO: 88.4 FL (ref 80–100)
MONOCYTES NFR BLD: 0.67 K/UL (ref 0.1–1.2)
MONOCYTES NFR BLD: 8 % (ref 3–12)
NEUTROPHILS NFR BLD: 76 % (ref 36–66)
NEUTS SEG NFR BLD: 6.35 K/UL (ref 1.5–8.1)
NRBC BLD-RTO: 0 PER 100 WBC
PLATELET # BLD AUTO: 254 K/UL (ref 150–450)
PMV BLD AUTO: 11.8 FL (ref 8–13.5)
POTASSIUM SERPL-SCNC: 3.6 MMOL/L (ref 3.7–5.3)
RBC # BLD AUTO: 4.4 M/UL (ref 3.95–5.11)
SODIUM SERPL-SCNC: 139 MMOL/L (ref 136–145)
WBC OTHER # BLD: 8.4 K/UL (ref 3.5–11)

## 2025-05-23 PROCEDURE — 82306 VITAMIN D 25 HYDROXY: CPT

## 2025-05-23 PROCEDURE — 2580000003 HC RX 258: Performed by: PHYSICIAN ASSISTANT

## 2025-05-23 PROCEDURE — 36415 COLL VENOUS BLD VENIPUNCTURE: CPT

## 2025-05-23 PROCEDURE — 6360000002 HC RX W HCPCS: Performed by: STUDENT IN AN ORGANIZED HEALTH CARE EDUCATION/TRAINING PROGRAM

## 2025-05-23 PROCEDURE — 99284 EMERGENCY DEPT VISIT MOD MDM: CPT

## 2025-05-23 PROCEDURE — 73700 CT LOWER EXTREMITY W/O DYE: CPT

## 2025-05-23 PROCEDURE — 73562 X-RAY EXAM OF KNEE 3: CPT

## 2025-05-23 PROCEDURE — 73590 X-RAY EXAM OF LOWER LEG: CPT

## 2025-05-23 PROCEDURE — 85025 COMPLETE CBC W/AUTO DIFF WBC: CPT

## 2025-05-23 PROCEDURE — 73552 X-RAY EXAM OF FEMUR 2/>: CPT

## 2025-05-23 PROCEDURE — 96374 THER/PROPH/DIAG INJ IV PUSH: CPT

## 2025-05-23 PROCEDURE — 99285 EMERGENCY DEPT VISIT HI MDM: CPT

## 2025-05-23 PROCEDURE — 84703 CHORIONIC GONADOTROPIN ASSAY: CPT

## 2025-05-23 PROCEDURE — 80048 BASIC METABOLIC PNL TOTAL CA: CPT

## 2025-05-23 RX ORDER — OXYCODONE AND ACETAMINOPHEN 5; 325 MG/1; MG/1
1 TABLET ORAL EVERY 6 HOURS PRN
Qty: 12 TABLET | Refills: 0 | Status: SHIPPED | OUTPATIENT
Start: 2025-05-23 | End: 2025-05-26

## 2025-05-23 RX ORDER — FENTANYL CITRATE 0.05 MG/ML
25 INJECTION, SOLUTION INTRAMUSCULAR; INTRAVENOUS ONCE
Status: DISCONTINUED | OUTPATIENT
Start: 2025-05-23 | End: 2025-05-23

## 2025-05-23 RX ORDER — 0.9 % SODIUM CHLORIDE 0.9 %
500 INTRAVENOUS SOLUTION INTRAVENOUS ONCE
Status: COMPLETED | OUTPATIENT
Start: 2025-05-23 | End: 2025-05-23

## 2025-05-23 RX ORDER — OXYCODONE AND ACETAMINOPHEN 5; 325 MG/1; MG/1
1 TABLET ORAL ONCE
Refills: 0 | Status: COMPLETED | OUTPATIENT
Start: 2025-05-24 | End: 2025-05-24

## 2025-05-23 RX ORDER — FENTANYL CITRATE 50 UG/ML
50 INJECTION, SOLUTION INTRAMUSCULAR; INTRAVENOUS ONCE
Status: COMPLETED | OUTPATIENT
Start: 2025-05-23 | End: 2025-05-23

## 2025-05-23 RX ADMIN — FENTANYL CITRATE 50 MCG: 50 INJECTION INTRAMUSCULAR; INTRAVENOUS at 19:30

## 2025-05-23 RX ADMIN — SODIUM CHLORIDE 500 ML: 0.9 INJECTION, SOLUTION INTRAVENOUS at 15:51

## 2025-05-23 RX ADMIN — SODIUM CHLORIDE 500 ML: 0.9 INJECTION, SOLUTION INTRAVENOUS at 15:56

## 2025-05-23 ASSESSMENT — PAIN SCALES - GENERAL
PAINLEVEL_OUTOF10: 9
PAINLEVEL_OUTOF10: 2
PAINLEVEL_OUTOF10: 10

## 2025-05-23 ASSESSMENT — PAIN - FUNCTIONAL ASSESSMENT
PAIN_FUNCTIONAL_ASSESSMENT: 0-10
PAIN_FUNCTIONAL_ASSESSMENT: 0-10
PAIN_FUNCTIONAL_ASSESSMENT: PREVENTS OR INTERFERES SOME ACTIVE ACTIVITIES AND ADLS
PAIN_FUNCTIONAL_ASSESSMENT: 0-10

## 2025-05-23 ASSESSMENT — PAIN DESCRIPTION - ONSET: ONSET: ON-GOING

## 2025-05-23 ASSESSMENT — PAIN DESCRIPTION - DESCRIPTORS: DESCRIPTORS: DISCOMFORT

## 2025-05-23 ASSESSMENT — PAIN DESCRIPTION - ORIENTATION
ORIENTATION: RIGHT
ORIENTATION: RIGHT

## 2025-05-23 ASSESSMENT — PAIN DESCRIPTION - PAIN TYPE: TYPE: ACUTE PAIN

## 2025-05-23 ASSESSMENT — LIFESTYLE VARIABLES
HOW MANY STANDARD DRINKS CONTAINING ALCOHOL DO YOU HAVE ON A TYPICAL DAY: PATIENT DOES NOT DRINK
HOW OFTEN DO YOU HAVE A DRINK CONTAINING ALCOHOL: NEVER
HOW MANY STANDARD DRINKS CONTAINING ALCOHOL DO YOU HAVE ON A TYPICAL DAY: PATIENT DOES NOT DRINK
HOW OFTEN DO YOU HAVE A DRINK CONTAINING ALCOHOL: NEVER

## 2025-05-23 ASSESSMENT — PAIN DESCRIPTION - FREQUENCY: FREQUENCY: CONTINUOUS

## 2025-05-23 ASSESSMENT — PAIN DESCRIPTION - LOCATION
LOCATION: LEG
LOCATION: KNEE

## 2025-05-23 NOTE — ED NOTES
Ice pack re-applied to patient's right knee below patella. Pedal pulses noted as +2 to dorsalis pedis site. Patient lying supine in position of comfort. BP checked and reported to provider.

## 2025-05-23 NOTE — ED NOTES
Pulse checked and confirmed to distal exposed site from splint on patients right leg. Capillary refill is brisk with intact sensation. Skin appears pink, warm, and dry distally from splint. Patient verbalizes comfort and benefit to splint application.
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
## 2025-05-23 NOTE — ED NOTES
TRANSFER - OUT REPORT:    Verbal report given to IGNACIO Aiken on Jojo Hernandez  being transferred to [facility] ER for routine progression of patient care       Report consisted of patient's Situation, Background, Assessment and   Recommendations(SBAR).     Information from the following report(s) Nurse Handoff Report, ED Encounter Summary, and Adult Overview was reviewed with the receiving nurse.    [place] Fall Assessment:    Presents to emergency department  because of falls (Syncope, seizure, or loss of consciousness): No  Age > 70: No  Altered Mental Status, Intoxication with alcohol or substance confusion (Disorientation, impaired judgment, poor safety awaremess, or inability to follow instructions): No  Impaired Mobility: Ambulates or transfers with assistive devices or assistance; Unable to ambulate or transer.: No  Nursing Judgement: No          Lines:   Peripheral IV 05/23/25 Right Antecubital (Active)   Site Assessment Clean, dry & intact 05/23/25 1556   Line Status Blood return noted;Flushed;Infusing;Normal saline locked 05/23/25 1556   Line Care Connections checked and tightened 05/23/25 1556   Phlebitis Assessment No symptoms 05/23/25 1556   Infiltration Assessment 0 05/23/25 1556   Alcohol Cap Used No 05/23/25 1556   Dressing Status Clean, dry & intact 05/23/25 1556   Dressing Type Transparent 05/23/25 1556        Opportunity for questions and clarification was provided.      Patient transported with:  Tech

## 2025-05-24 PROCEDURE — 6370000000 HC RX 637 (ALT 250 FOR IP): Performed by: STUDENT IN AN ORGANIZED HEALTH CARE EDUCATION/TRAINING PROGRAM

## 2025-05-24 RX ADMIN — OXYCODONE HYDROCHLORIDE AND ACETAMINOPHEN 1 TABLET: 5; 325 TABLET ORAL at 00:06

## 2025-05-24 ASSESSMENT — ENCOUNTER SYMPTOMS
NAUSEA: 0
SHORTNESS OF BREATH: 0
CONSTIPATION: 0
SORE THROAT: 0
VOMITING: 0
BACK PAIN: 0
SINUS PRESSURE: 0
ABDOMINAL PAIN: 0
WHEEZING: 0
DIARRHEA: 0
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
## 2025-05-24 NOTE — ED PROVIDER NOTES
Faculty Sign-Out Attestation  Handoff taken on the following patient from prior Attending Physician: Nancy  Note Started: 12:20 AM EDT    I was available and discussed any additional care issues that arose and coordinated the management plans with the resident(s) caring for the patient during my duty period. Any areas of disagreement with resident’s documentation of care or procedures are noted on the chart. I was personally present for the key portions of any/all procedures during my duty period. I have documented in the chart those procedures where I was not present during the key portions.    Tibial plateau fx, ortho saw pt,   Await discharge d/t pain control, >>> discharged  - may need recheck with ortho    Chaz Grissom DO  Attending Physician       Chaz Grissom DO  05/24/25 0021       Chaz Grissom DO  05/24/25 0136
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
## 2025-05-24 NOTE — DISCHARGE INSTRUCTIONS
Orthopaedic Instructions:  -Weight bearing status: Non weight bearing with the right leg, OK to put minimal weight on the toes if you need to for balance purposes. Keep hinged knee brace on locked at 30 degrees of flexion.  -Always look for signs of compartment syndrome: pain out of proportion to the injury, pain not controlled with pain medication, numbness in digits, changing of color of digits (paleness). If these signs occur return to ED immediately for reassessment.  -Always work on ankle, toe motion to decrease swelling.  -Ice (20 minutes on and off 1 hour) and elevate to reduce swelling and throbbing pain.  -Call the office or come to Emergency Room if signs of infection appear (hot, swollen, red, draining pus, fever)  -Take medications as prescribed.  -Wean off narcotics (percocet/norco) as soon as possible. Do not take tylenol if still taking narcotics.  -Follow up with Dr. Bowie in his office on 5/27/25 at 2:15pm. Call [phone] to schedule/confirm or with any questions/concerns.
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
## 2025-05-24 NOTE — ED NOTES
Pt had concerns of being discharged from ED due to still being in pain.   Resident physician provided pt with at home instructions and pt verbalized understanding.   Pt provided with crutches to assist with walking, however refusing to use them.   RN's at bedside to help pt use crutches.  Pt demonstrated proper use of crutches at bedside, however still refused to leave.   Pt told parents who were willing to help her to leave.   Resident at bedside again to explain to pt that there is no medical reason for her to be admitted to the hospital.   Pt still refusing to leave after multiple attempts.   ED coordinator came to bedside to help explain plan of care after discharge to pt.  Pt eventually assisted to wheelchair and agreeable to sit in triage and call sister to pick her up.   Pt left in WC with discharge paper work, crutches in hand and all pt belongings.
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
## 2025-05-24 NOTE — ED NOTES
Pt called out about being concerned about being discharged home. Pt states she does not feel comfortable being in pain and going home. Pt states she \"would like to know the options\". Resident made aware.
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
## 2025-05-27 ENCOUNTER — OFFICE VISIT (OUTPATIENT)
Dept: ORTHOPEDIC SURGERY | Age: 35
End: 2025-05-27
Payer: COMMERCIAL

## 2025-05-27 VITALS — HEIGHT: 62 IN | BODY MASS INDEX: 25.6 KG/M2

## 2025-05-27 DIAGNOSIS — S82.141D CLOSED FRACTURE OF RIGHT TIBIAL PLATEAU WITH ROUTINE HEALING, SUBSEQUENT ENCOUNTER: Primary | ICD-10-CM

## 2025-05-27 PROBLEM — S82.141A CLOSED FRACTURE OF RIGHT TIBIAL PLATEAU: Status: ACTIVE | Noted: 2025-05-27

## 2025-05-27 PROCEDURE — 99204 OFFICE O/P NEW MOD 45 MIN: CPT | Performed by: PHYSICIAN ASSISTANT

## 2025-05-27 RX ORDER — OXYCODONE AND ACETAMINOPHEN 5; 325 MG/1; MG/1
1 TABLET ORAL EVERY 6 HOURS PRN
Qty: 28 TABLET | Refills: 0 | Status: SHIPPED | OUTPATIENT
Start: 2025-05-27 | End: 2025-06-03

## 2025-05-27 NOTE — PROGRESS NOTES
signed by JANEL Espinoza on 5/27/2025 at 3:24 PM    This note is created with the assistance of a speech recognition program.  While intending to generate a document that actually reflects the content of the visit, the document can still have some errors including those of syntax and sound a like substitutions which may escape proof reading.  In such instances, actual meaning can be extrapolated by contextual diversion

## 2025-06-12 ENCOUNTER — OFFICE VISIT (OUTPATIENT)
Dept: ORTHOPEDIC SURGERY | Age: 35
End: 2025-06-12
Payer: COMMERCIAL

## 2025-06-12 VITALS — WEIGHT: 140 LBS | HEIGHT: 62 IN | BODY MASS INDEX: 25.76 KG/M2

## 2025-06-12 DIAGNOSIS — S82.141D CLOSED FRACTURE OF RIGHT TIBIAL PLATEAU WITH ROUTINE HEALING, SUBSEQUENT ENCOUNTER: Primary | ICD-10-CM

## 2025-06-12 PROCEDURE — 99213 OFFICE O/P EST LOW 20 MIN: CPT | Performed by: PHYSICIAN ASSISTANT
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
## 2025-07-03 ENCOUNTER — OFFICE VISIT (OUTPATIENT)
Dept: ORTHOPEDIC SURGERY | Age: 35
End: 2025-07-03
Payer: COMMERCIAL

## 2025-07-03 VITALS — HEIGHT: 62 IN | BODY MASS INDEX: 25.6 KG/M2

## 2025-07-03 DIAGNOSIS — S82.141D CLOSED FRACTURE OF RIGHT TIBIAL PLATEAU WITH ROUTINE HEALING, SUBSEQUENT ENCOUNTER: Primary | ICD-10-CM

## 2025-07-03 PROCEDURE — 99213 OFFICE O/P EST LOW 20 MIN: CPT | Performed by: PHYSICIAN ASSISTANT
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
## 2025-07-18 ENCOUNTER — HOSPITAL ENCOUNTER (EMERGENCY)
Age: 35
Discharge: HOME | End: 2025-07-18
Attending: EMERGENCY MEDICINE
Payer: COMMERCIAL

## 2025-07-18 VITALS
WEIGHT: 135 LBS | HEART RATE: 79 BPM | SYSTOLIC BLOOD PRESSURE: 129 MMHG | DIASTOLIC BLOOD PRESSURE: 76 MMHG | RESPIRATION RATE: 18 BRPM | BODY MASS INDEX: 24.84 KG/M2 | OXYGEN SATURATION: 98 % | HEIGHT: 62 IN | TEMPERATURE: 98 F

## 2025-07-18 DIAGNOSIS — F41.9 ANXIETY: Primary | ICD-10-CM

## 2025-07-18 PROCEDURE — 99282 EMERGENCY DEPT VISIT SF MDM: CPT

## 2025-07-18 ASSESSMENT — PAIN - FUNCTIONAL ASSESSMENT: PAIN_FUNCTIONAL_ASSESSMENT: 0-10

## 2025-07-18 ASSESSMENT — PAIN SCALES - GENERAL
PAINLEVEL_OUTOF10: 0
PAINLEVEL_OUTOF10: 0

## 2025-07-19 ASSESSMENT — ENCOUNTER SYMPTOMS
ABDOMINAL PAIN: 0
COLOR CHANGE: 0
SHORTNESS OF BREATH: 0
EYE PAIN: 0
BACK PAIN: 0
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
## 2025-07-19 NOTE — ED PROVIDER NOTES
EMERGENCY DEPARTMENT ENCOUNTER    Pt Name: Jojo Hernandez  MRN: [id number]  Birthdate 1990  Date of evaluation: 25  CHIEF COMPLAINT       Chief Complaint   Patient presents with    Anxiety     HISTORY OF PRESENT ILLNESS   35-year-old female presents for mental health evaluation.  Patient reports that her and her children's father are going through a separation, she states that she has not been able to see her children, she states this is causing her a lot of stress and anxiety.  She states that she has been seeing a therapist but she does not feel that they are understanding her situation.  She states that she came today to talk to someone about the situation.  She denies any suicidal or homicidal ideation she denies visual or auditory hallucinations she denies drug or alcohol use denies any current medical complaints.    The history is provided by the patient.           REVIEW OF SYSTEMS     Review of Systems   Constitutional:  Negative for fever.   HENT:  Negative for congestion and ear pain.    Eyes:  Negative for pain.   Respiratory:  Negative for shortness of breath.    Cardiovascular:  Negative for chest pain, palpitations and leg swelling.   Gastrointestinal:  Negative for abdominal pain.   Genitourinary:  Negative for dysuria and flank pain.   Musculoskeletal:  Negative for back pain.   Skin:  Negative for color change.   Neurological:  Negative for numbness and headaches.   Psychiatric/Behavioral:  Negative for confusion and suicidal ideas. The patient is nervous/anxious.    All other systems reviewed and are negative.    PASTMEDICAL HISTORY     Past Medical History:   Diagnosis Date    Hypothyroidism     PCOS (polycystic ovarian syndrome)      Past Problem List  Patient Active Problem List   Diagnosis Code    PCOS (polycystic ovarian syndrome) E28.2    Influenza vaccine needed Z23    h/o  x1 Z98.891    Hypothyroidism E03.9    Pregnancy conceived with femara O09.90    Fetal

## (undated) DEVICE — SWAB MEDICATED TINC BENZ

## (undated) DEVICE — SUTURE COAT VCRL SZ 0 L36IN ABSRB VLT CTX L48MM TAPERPOINT J370H

## (undated) DEVICE — SOLUTION SOD CHL 0.9% 1000ML

## (undated) DEVICE — KENDALL SCD EXPRESS SLEEVES, KNEE LENGTH, MEDIUM: Brand: KENDALL SCD

## (undated) DEVICE — TOWEL SURG W16XL26IN WHT NONFENESTRATED ST 2 PER PK

## (undated) DEVICE — 3M™ STERI-STRIP™ ANTIMICROBIAL SKIN CLOSURES 1 IN X 5 IN, 25/CAR, 4 CAR/CASE A1848: Brand: 3M™ STERI-STRIP™

## (undated) DEVICE — SOLUTION IV IRRIG WATER 500ML POUR BRL ST 2F7113